# Patient Record
Sex: FEMALE | Race: WHITE | NOT HISPANIC OR LATINO | URBAN - METROPOLITAN AREA
[De-identification: names, ages, dates, MRNs, and addresses within clinical notes are randomized per-mention and may not be internally consistent; named-entity substitution may affect disease eponyms.]

---

## 2022-08-17 ENCOUNTER — INPATIENT (INPATIENT)
Facility: HOSPITAL | Age: 87
LOS: 5 days | Discharge: NURSING, REHAB OR LONG-TERM CARE | DRG: 184 | End: 2022-08-23
Attending: INTERNAL MEDICINE | Admitting: HOSPITALIST
Payer: MEDICARE

## 2022-08-17 VITALS
HEIGHT: 64 IN | WEIGHT: 149.91 LBS | HEART RATE: 116 BPM | OXYGEN SATURATION: 95 % | DIASTOLIC BLOOD PRESSURE: 73 MMHG | SYSTOLIC BLOOD PRESSURE: 121 MMHG | RESPIRATION RATE: 20 BRPM | TEMPERATURE: 98 F

## 2022-08-17 LAB
ALBUMIN SERPL ELPH-MCNC: 3 G/DL — LOW (ref 3.3–5)
ALP SERPL-CCNC: 95 U/L — SIGNIFICANT CHANGE UP (ref 40–120)
ALT FLD-CCNC: 7 U/L — LOW (ref 10–45)
ANION GAP SERPL CALC-SCNC: 11 MMOL/L — SIGNIFICANT CHANGE UP (ref 5–17)
APTT BLD: 45 SEC — HIGH (ref 27.5–35.5)
AST SERPL-CCNC: 15 U/L — SIGNIFICANT CHANGE UP (ref 10–40)
BASOPHILS # BLD AUTO: 0.04 K/UL — SIGNIFICANT CHANGE UP (ref 0–0.2)
BASOPHILS NFR BLD AUTO: 0.6 % — SIGNIFICANT CHANGE UP (ref 0–2)
BILIRUB SERPL-MCNC: 0.3 MG/DL — SIGNIFICANT CHANGE UP (ref 0.2–1.2)
BUN SERPL-MCNC: 12 MG/DL — SIGNIFICANT CHANGE UP (ref 7–23)
CALCIUM SERPL-MCNC: 10.5 MG/DL — SIGNIFICANT CHANGE UP (ref 8.4–10.5)
CHLORIDE SERPL-SCNC: 100 MMOL/L — SIGNIFICANT CHANGE UP (ref 96–108)
CO2 SERPL-SCNC: 29 MMOL/L — SIGNIFICANT CHANGE UP (ref 22–31)
CREAT SERPL-MCNC: 0.54 MG/DL — SIGNIFICANT CHANGE UP (ref 0.5–1.3)
EGFR: 89 ML/MIN/1.73M2 — SIGNIFICANT CHANGE UP
EOSINOPHIL # BLD AUTO: 0.11 K/UL — SIGNIFICANT CHANGE UP (ref 0–0.5)
EOSINOPHIL NFR BLD AUTO: 1.6 % — SIGNIFICANT CHANGE UP (ref 0–6)
GLUCOSE SERPL-MCNC: 137 MG/DL — HIGH (ref 70–99)
HCT VFR BLD CALC: 33.9 % — LOW (ref 34.5–45)
HGB BLD-MCNC: 10.2 G/DL — LOW (ref 11.5–15.5)
IMM GRANULOCYTES NFR BLD AUTO: 0.4 % — SIGNIFICANT CHANGE UP (ref 0–1.5)
INR BLD: 3.11 RATIO — HIGH (ref 0.88–1.16)
LYMPHOCYTES # BLD AUTO: 1.32 K/UL — SIGNIFICANT CHANGE UP (ref 1–3.3)
LYMPHOCYTES # BLD AUTO: 18.7 % — SIGNIFICANT CHANGE UP (ref 13–44)
MAGNESIUM SERPL-MCNC: 1.8 MG/DL — SIGNIFICANT CHANGE UP (ref 1.6–2.6)
MCHC RBC-ENTMCNC: 27.3 PG — SIGNIFICANT CHANGE UP (ref 27–34)
MCHC RBC-ENTMCNC: 30.1 GM/DL — LOW (ref 32–36)
MCV RBC AUTO: 90.6 FL — SIGNIFICANT CHANGE UP (ref 80–100)
MONOCYTES # BLD AUTO: 0.71 K/UL — SIGNIFICANT CHANGE UP (ref 0–0.9)
MONOCYTES NFR BLD AUTO: 10 % — SIGNIFICANT CHANGE UP (ref 2–14)
NEUTROPHILS # BLD AUTO: 4.86 K/UL — SIGNIFICANT CHANGE UP (ref 1.8–7.4)
NEUTROPHILS NFR BLD AUTO: 68.7 % — SIGNIFICANT CHANGE UP (ref 43–77)
NRBC # BLD: 0 /100 WBCS — SIGNIFICANT CHANGE UP (ref 0–0)
NT-PROBNP SERPL-SCNC: 337 PG/ML — HIGH (ref 0–300)
PLATELET # BLD AUTO: 430 K/UL — HIGH (ref 150–400)
POTASSIUM SERPL-MCNC: 3.7 MMOL/L — SIGNIFICANT CHANGE UP (ref 3.5–5.3)
POTASSIUM SERPL-SCNC: 3.7 MMOL/L — SIGNIFICANT CHANGE UP (ref 3.5–5.3)
PROT SERPL-MCNC: 6.7 G/DL — SIGNIFICANT CHANGE UP (ref 6–8.3)
PROTHROM AB SERPL-ACNC: 36.5 SEC — HIGH (ref 10.5–13.4)
RAPID RVP RESULT: SIGNIFICANT CHANGE UP
RBC # BLD: 3.74 M/UL — LOW (ref 3.8–5.2)
RBC # FLD: 14.8 % — HIGH (ref 10.3–14.5)
SARS-COV-2 RNA SPEC QL NAA+PROBE: SIGNIFICANT CHANGE UP
SODIUM SERPL-SCNC: 140 MMOL/L — SIGNIFICANT CHANGE UP (ref 135–145)
TROPONIN T, HIGH SENSITIVITY RESULT: 16 NG/L — SIGNIFICANT CHANGE UP (ref 0–51)
TROPONIN T, HIGH SENSITIVITY RESULT: 17 NG/L — SIGNIFICANT CHANGE UP (ref 0–51)
WBC # BLD: 7.07 K/UL — SIGNIFICANT CHANGE UP (ref 3.8–10.5)
WBC # FLD AUTO: 7.07 K/UL — SIGNIFICANT CHANGE UP (ref 3.8–10.5)

## 2022-08-17 PROCEDURE — 99285 EMERGENCY DEPT VISIT HI MDM: CPT | Mod: 25

## 2022-08-17 PROCEDURE — 71275 CT ANGIOGRAPHY CHEST: CPT | Mod: 26,MA

## 2022-08-17 PROCEDURE — 93010 ELECTROCARDIOGRAM REPORT: CPT

## 2022-08-17 RX ORDER — LIDOCAINE 4 G/100G
1 CREAM TOPICAL ONCE
Refills: 0 | Status: COMPLETED | OUTPATIENT
Start: 2022-08-17 | End: 2022-08-17

## 2022-08-17 RX ORDER — SODIUM CHLORIDE 9 MG/ML
500 INJECTION INTRAMUSCULAR; INTRAVENOUS; SUBCUTANEOUS ONCE
Refills: 0 | Status: COMPLETED | OUTPATIENT
Start: 2022-08-17 | End: 2022-08-17

## 2022-08-17 RX ORDER — ACETAMINOPHEN 500 MG
1000 TABLET ORAL ONCE
Refills: 0 | Status: COMPLETED | OUTPATIENT
Start: 2022-08-17 | End: 2022-08-17

## 2022-08-17 RX ADMIN — LIDOCAINE 1 PATCH: 4 CREAM TOPICAL at 21:37

## 2022-08-17 RX ADMIN — Medication 400 MILLIGRAM(S): at 22:01

## 2022-08-17 RX ADMIN — SODIUM CHLORIDE 1000 MILLILITER(S): 9 INJECTION INTRAMUSCULAR; INTRAVENOUS; SUBCUTANEOUS at 20:09

## 2022-08-17 NOTE — CONSULT NOTE ADULT - ATTENDING COMMENTS
88 yo f, on AC, found to have abnormal TTE, transferred to [facility] for evaluation, h/o fall a few days ago, injuries include left 10-11 rib fractures.  - Recommend admission to medicine for cardiac workup.  - No respiratory distress, continue with IS and pulmonary toilet. Currently pulling 600cc on IS.  - No surgical intervention indicated.

## 2022-08-17 NOTE — ED PROVIDER NOTE - ATTENDING CONTRIBUTION TO CARE
Attending (Alex Quinn D.O.):  I have personally seen and examined this patient. I have performed a substantive portion of the visit including all aspects of the medical decision making. Resident, fellow, and/or ACP note reviewed. I agree on the plan of care except where noted.    see mdm

## 2022-08-17 NOTE — ED PROVIDER NOTE - NS ED ATTENDING STATEMENT MOD
Attending with consult w/ pt's family directly relating to pts condition/direct patient care (not related to procedure)/documentation/additional history taking/interpretation of diagnostic studies/consultation with other physicians

## 2022-08-17 NOTE — ED PROVIDER NOTE - CLINICAL SUMMARY MEDICAL DECISION MAKING FREE TEXT BOX
Attending (Alex Quinn D.O.):  87F on xarelto for unclear reason here from cardiology after abnormal TTE and ekg but unclear what (no documents). Patient has been having bilateral shoulder pain for years. + Fall 8/5 and since then increased difficulty ambulating 2/2 pain by chest wall. + bruise to left chest wall. Benign abdomen. + rales at left lung base. Tachycardic here, on RA, hemodynamically stable. No other distal extremity tenderness. Neurovasc intact. Eval for PE vs ACS vs metabolic derrangement vs occult anemia. No fever, low suspicion for infectious process at present. Check labs, cardiac biomarkers,, EKG, CXR, place on cardiac monitor for telemetry monitoring. CTA chest. Hydrate. Attending (Alex Quinn D.O.):  87F on xarelto for unclear reason here from cardiology after abnormal TTE and ekg but unclear what (no documents). Patient has been having bilateral shoulder pain for years. + Fall 8/5 and since then increased difficulty ambulating 2/2 pain by chest wall. + bruise to left chest wall. Benign abdomen. + rales at left lung base. Tachycardic here, on RA, hemodynamically stable. No other distal extremity tenderness. Neurovasc intact. Eval for PE vs ACS vs metabolic derrangement vs occult anemia. No fever, low suspicion for infectious process at present. Check labs, cardiac biomarkers,, EKG, CXR, place on cardiac monitor for telemetry monitoring. CTA chest. Nazario Bernstein PGY2: well appearing female presents for direct admission with tachycardia and sob. on xarelto per old documentation, unknown why she takes it. concern for PE v UTI as reason for tachycardia. no electrical alternans. no muffled heart sounds on exam. infectious and screening labs. CXR for atypical pna. MERCEDES ballard.

## 2022-08-17 NOTE — ED PROVIDER NOTE - PROGRESS NOTE DETAILS
Attending (Alex Quinn D.O.):  CTA commen on Left rib fracture 10/11. Will dose analgesia as patient's pain increasing. No resp distress, still sat >95% on RA. Will discuss with surg.

## 2022-08-17 NOTE — ED PROVIDER NOTE - OBJECTIVE STATEMENT
h/o arthritis presents with request for direct admission to Dr. Braydon Cunningham in the setting of outpatient echo that was abnormal. unknown echo findings. pt denies fever chills. +sob +palps +skipping beats. +urinary odor and concentration noted today. no abd pain n/v. +wt loss in the setting of decreased appetire. no LE edema or pain.

## 2022-08-17 NOTE — ED PROVIDER NOTE - PHYSICAL EXAMINATION
General: non-toxic, NAD  HEENT: NCAT, PERRL  Cardiac: tachycardic no murmurs, 2+ peripheral pulses  Resp: CTAB  Abdomen: soft, non-distended, bowel sounds present, no ttp, no rebound or guarding. no organomegaly  MSK: no midline spinal tenderness. no peripheral edema, calf tenderness, or leg size discrepancies  Skin: ecchymosis L flank  Neuro: AAOx4, 5+motor, sensation grossly intact CN 2-12 intact  Psych: mood and affect appropriate

## 2022-08-17 NOTE — CONSULT NOTE ADULT - SUBJECTIVE AND OBJECTIVE BOX
TRAUMA SURGERY CONSULT NOTE  Consulting surgical team: Trauma ACS  Consulting attending: Dr. Hoyt    HPI:  88yo F on xarelto (?) presenting due to outpt referral from cardiology due to abnormal TTE. Pt has been feeling weak for several months, and saw cardiology (Dr. Wicho Forbes) earlier this week who sent pt in due to concerning findings on echo ("weak heart and fast heart rate" per daughter).     Trauma consulted due to reported fall a few days ago. Son reports that pt fell at home while trying to hold on to a doorknob but missed it, fell on side, no head strike or LOC. Pt does not have pain, n/v, or dizziness. Pt pulling 600cc on IS.    PAST MEDICAL HISTORY:  Unknown    PAST SURGICAL HISTORY:  B/l knee replacements  Hip surgery    MEDICATIONS:      ALLERGIES:  No Known Allergies      VITALS & I/Os:  Vital Signs Last 24 Hrs  T(C): 36.7 (17 Aug 2022 18:20), Max: 36.7 (17 Aug 2022 16:27)  T(F): 98 (17 Aug 2022 18:20), Max: 98.1 (17 Aug 2022 16:27)  HR: 115 (17 Aug 2022 18:20) (115 - 116)  BP: 111/75 (17 Aug 2022 18:20) (111/75 - 121/73)  BP(mean): --  RR: 20 (17 Aug 2022 16:27) (20 - 20)  SpO2: 97% (17 Aug 2022 18:20) (95% - 97%)    Parameters below as of 17 Aug 2022 18:20  Patient On (Oxygen Delivery Method): room air        PHYSICAL EXAM:  GEN: resting comfortably in bed, in NAD  CHEST: no increased WOB  ABD: soft, non-distended, non-tender   EXT: warm, well perfused  NEURO: A&Ox3    LABS:                        10.2   7.07  )-----------( 430      ( 17 Aug 2022 20:11 )             33.9     08-17    140  |  100  |  12  ----------------------------<  137<H>  3.7   |  29  |  0.54    Ca    10.5      17 Aug 2022 20:11  Mg     1.8     08-17    TPro  6.7  /  Alb  3.0<L>  /  TBili  0.3  /  DBili  x   /  AST  15  /  ALT  7<L>  /  AlkPhos  95  08-17    Lactate:    PT/INR - ( 17 Aug 2022 20:11 )   PT: 36.5 sec;   INR: 3.11 ratio         PTT - ( 17 Aug 2022 20:11 )  PTT:45.0 sec        IMAGING:  CT Angio Chest PE Protocol w/ IV Cont (08.17.22 @ 20:56)    FINDINGS:    PULMONARY ANGIOGRAM: Limited evaluation of some segmental and   subsegmental pulmonary arteries. There is no pulmonary embolism to the   level of the lobar arteries, or within the visualized segmental and   subsegmentalbranches.    LYMPH NODES: Unremarkable bilateral axillary lymph nodes. 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 Absent thyroid gland.    HEART/VASCULATURE: Cardiomegaly. No pericardial effusion. Coronary artery   calcifications. Aortic calcifications.    AIRWAYS/LUNGS/PLEURA: Central airways are patent. Scarring and   calcification in the right lung apex. Mild medial left lower lobe   atelectasis. Small left pleural effusion. Few scattered <6mm solid   pulmonary nodules bilaterally.    UPPER ABDOMEN: Large hiatal hernia. Punctate right hepatic lobe   calcification. Specifically bilateral adrenal gland thickening.    BONES/SOFT TISSUES: Degenerative changes. Acute nondisplaced left   posterior 10th and 11th rib fractures. Healed right rib fractures.   Paraspinal muscular atrophy. Mild anasarca.    IMPRESSION:    Limited evaluation of some segmental and subsegmental pulmonary arteries.   There is no pulmonary embolism to the level of the lobar arteries, or   within the visualized segmental and subsegmental branches.    Acute nondisplaced left 10th and 11th rib fractures. Small left pleural   effusion.   TRAUMA SURGERY CONSULT NOTE  Consulting surgical team: Trauma ACS  Consulting attending: Dr. Hoyt    HPI:  88yo F on xarelto (?) presenting due to outpt referral from cardiology due to abnormal TTE. Pt has been feeling weak for several months, and saw cardiology (Dr. Wicho Forbes) earlier this week who sent pt in due to concerning findings on echo ("weak heart and fast heart rate" per daughter).     Trauma consulted due to reported fall a few days ago. Son reports that pt fell at home while trying to hold on to a doorknob but missed it, fell on side, no head strike or LOC. Pt does not have pain, n/v, or dizziness. Pt pulling 600cc on IS.    PAST MEDICAL HISTORY:  Unknown    PAST SURGICAL HISTORY:  B/l knee replacements  Hip surgery    MEDICATIONS:      ALLERGIES:  No Known Allergies      VITALS & I/Os:  Vital Signs Last 24 Hrs  T(C): 36.7 (17 Aug 2022 18:20), Max: 36.7 (17 Aug 2022 16:27)  T(F): 98 (17 Aug 2022 18:20), Max: 98.1 (17 Aug 2022 16:27)  HR: 115 (17 Aug 2022 18:20) (115 - 116)  BP: 111/75 (17 Aug 2022 18:20) (111/75 - 121/73)  BP(mean): --  RR: 20 (17 Aug 2022 16:27) (20 - 20)  SpO2: 97% (17 Aug 2022 18:20) (95% - 97%)    Parameters below as of 17 Aug 2022 18:20  Patient On (Oxygen Delivery Method): room air        PHYSICAL EXAM:  GEN: resting comfortably in bed, in NAD  HEENT: normocephalic, non-tender to palpation, no abrasions visible, no step-offs palpated  NECK: trachea midline, non-tender to palpation at posterior midline, no pain with flexion, extension, and bilateral neck rotation  CHEST: non-tender to palpation across clavicles and b/l anterior ribs  BACK: non-tender to palpation along cervical, thoracic, lumbar spine midline and b/l posterior ribs; no palpable step-offs or hematomas  ABD: soft, non-distended, non-tender   PELVIS: no pelvic instability  RECTUM: good rectal tone  LUE: no visible abrasions or deformities, non-tender to palpation across upper and lower arm, 5/5  strength, fingers warm, well-perfused, full ROM in shoulder, elbow, wrist, and fingers, palpable radial pulse  RUE: no visible abrasions or deformities, non-tender to palpation across upper and lower arm, 5/5  strength, fingers warm, well-perfused, full ROM in shoulder, elbow, wrist, and fingers, palpable radial pulse  LLE: no visible abrasions or deformities, non-tender to palpation across upper and lower leg; full ROM in hip, knee, ankle, and toes, 5/5 dorsiflexion + plantarflexion, palpable DP pulse; warm, well-perfused  RLE: no visible abrasions or deformities, non-tender to palpation across upper and lower leg; full ROM in hip, knee, ankle, and toes, 5/5 dorsiflexion + plantarflexion, palpable DP pulse; warm, well-perfused  NEURO: AAOx4, no focal neuro deficits; CN II-IX intact; pupils 3mm, equal and reactive to light bilaterally      LABS:                        10.2   7.07  )-----------( 430      ( 17 Aug 2022 20:11 )             33.9     08-17    140  |  100  |  12  ----------------------------<  137<H>  3.7   |  29  |  0.54    Ca    10.5      17 Aug 2022 20:11  Mg     1.8     08-17    TPro  6.7  /  Alb  3.0<L>  /  TBili  0.3  /  DBili  x   /  AST  15  /  ALT  7<L>  /  AlkPhos  95  08-17    Lactate:    PT/INR - ( 17 Aug 2022 20:11 )   PT: 36.5 sec;   INR: 3.11 ratio         PTT - ( 17 Aug 2022 20:11 )  PTT:45.0 sec        IMAGING:  CT Angio Chest PE Protocol w/ IV Cont (08.17.22 @ 20:56)    FINDINGS:    PULMONARY ANGIOGRAM: Limited evaluation of some segmental and   subsegmental pulmonary arteries. There is no pulmonary embolism to the   level of the lobar arteries, or within the visualized segmental and   subsegmentalbranches.    LYMPH NODES: Unremarkable bilateral axillary lymph nodes. 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 Absent thyroid gland.    HEART/VASCULATURE: Cardiomegaly. No pericardial effusion. Coronary artery   calcifications. Aortic calcifications.    AIRWAYS/LUNGS/PLEURA: Central airways are patent. Scarring and   calcification in the right lung apex. Mild medial left lower lobe   atelectasis. Small left pleural effusion. Few scattered <6mm solid   pulmonary nodules bilaterally.    UPPER ABDOMEN: Large hiatal hernia. Punctate right hepatic lobe   calcification. Specifically bilateral adrenal gland thickening.    BONES/SOFT TISSUES: Degenerative changes. Acute nondisplaced left   posterior 10th and 11th rib fractures. Healed right rib fractures.   Paraspinal muscular atrophy. Mild anasarca.    IMPRESSION:    Limited evaluation of some segmental and subsegmental pulmonary arteries.   There is no pulmonary embolism to the level of the lobar arteries, or   within the visualized segmental and subsegmental branches.    Acute nondisplaced left 10th and 11th rib fractures. Small left pleural   effusion.

## 2022-08-17 NOTE — ED ADULT NURSE NOTE - OBJECTIVE STATEMENT
Assumed care of pt at 1918. Pt A&Ox4 c/o recent cardiology visit advising pt to go to ED for further evaluation. Pt accompanied by daughter who is primary caretaker. As Per pt daughter, pt fell on 8/5 at home, pt bruised back but all xrays came back unremarkable. As per pt daughter ever since fall pt has "been having a lot of problems with pain, walking, and moving arms/legs.". Pt denies chest pain and SOB, pt 98% on RA, RR even and unlabored. Pt reports to having 7/10 pain in back and arms. Pt Afib rate controlled on tele. Cardiac monitoring initiated. MD at bedside assessing pt.

## 2022-08-17 NOTE — CONSULT NOTE ADULT - ASSESSMENT
88yo F on xarelto (?), unknown medical history presenting due to outpt referral from cardiology due to abnormal TTE. Trauma surgery consulted due to fall a few days ago.    Plan:  - L 10 and 11 rib fx: pain control per rib fx management protocol, encourage IS, OOB  - Medicine admission for cardiac workup  - Tertiary in AM  - Discussed with Dr. Hoyt    ACS/Trauma Surgery  [id number]  86yo F on xarelto (?), unknown medical history presenting due to outpt referral from cardiology due to abnormal TTE. Trauma surgery consulted due to fall a few days ago.    Plan:  - L 10 and 11 rib fx: pain control per rib fx management protocol, encourage IS, OOB  - Pt to be admitted to medicine for cardiac workup  - Tertiary in AM  - Discussed with Dr. Hoyt    ACS/Trauma Surgery  [id number]

## 2022-08-17 NOTE — ED ADULT NURSE NOTE - CAS EDN INTEG ASSESS
Noelle Bryant is 7 year old 0 month old, here for a preventive care visit.    Assessment & Plan   1. Encounter for routine child health examination with abnormal findings  See below. Father declined influenza vaccine.   - BEHAVIORAL/EMOTIONAL ASSESSMENT (04638)  - SCREENING TEST, PURE TONE, AIR ONLY  - SCREENING, VISUAL ACUITY, QUANTITATIVE, BILAT    2. Speech delay  3. Developmental delay  Patient comes from a Hmong speaking home, and attends school in which English is the primary language. In my brief evaluation today with the assistance of a Hmong  shows that Noelle would not answer simple questions appropriately in English or Hmong. It sounds like Noelle is currently undergoing some evaluation through the school as the family was recently called by someone to discuss Noelle's development. The family was informed from the school that there is also some behavioral concerns including difficulty with self care after going to the bathroom (wiping). Review of chart shows IEP from 2021 that indicated language and developmental delay. It does not appear that genetic testing has been completed.    - ROBYN signed for school. Will have social work team reach out to obtain IEP  - Discuss referral for genetic evaluation given several siblings have similar difficulties  - Close follow up indicated      4. Failed hearing screening  Patient was unable to complete hearing screen today.  She did not appear to understand the concept for the screening test.  Discussed that next step is for her to be evaluated officially by audiology. Father declines this as he does not believe that she has an issue with hearing. Notably, on exam the patient's left TM was somewhat red and retracted. No other signs or symptoms of infection on exam. No antibiotics indicated at this time.  - Follow up in 1 month for hearing rescreen in clinic and further discussion regarding audiology referral  - Recheck left TM for  change      Growth        Normal height and weight    No weight concerns.    Immunizations     Patient/Parent(s) declined some/all vaccines today.  influenza      Anticipatory Guidance    Reviewed age appropriate anticipatory guidance.   The following topics were discussed:  SOCIAL/ FAMILY:    Encourage reading  NUTRITION:    Healthy snacks    Balanced diet  HEALTH/ SAFETY:    Physical activity    Regular dental care    Referrals/Ongoing Specialty Care  Verbal referral for routine dental care  Referral made to audiology, however father declined after discussion    Follow Up      Return in 1 month (on 3/18/2022) for Follow up speech delay, Left ear exam.    Subjective     Additional Questions 2/18/2022   Do you have any questions today that you would like to discuss? Yes   Questions Concern about slow speaking. Father feels that she doesn't respond to questions quickly.    Has your child had a surgery, major illness or injury since the last physical exam? No       Social 2/18/2022   Who does your child live with? Parent(s)   Has your child experienced any stressful family events recently? None   In the past 12 months, has lack of transportation kept you from medical appointments or from getting medications? No   In the last 12 months, was there a time when you were not able to pay the mortgage or rent on time? No   In the last 12 months, was there a time when you did not have a steady place to sleep or slept in a shelter (including now)? No     Health Risks/Safety 2/18/2022   What type of car seat does your child use? (!) SEAT BELT ONLY   Where does your child sit in the car?  Back seat   Do you have a swimming pool? No   Is your child ever home alone?  No     TB Screening 2/18/2022   Since your last Well Child visit, have any of your child's family members or close contacts had tuberculosis or a positive tuberculosis test? No   Since your last Well Child Visit, has your child or any of their family members or close  contacts traveled or lived outside of the [place]? No   Since your last Well Child visit, has your child lived in a high-risk group setting like a correctional facility, health care facility, homeless shelter, or refugee camp? No           Dental Screening 2/18/2022   Has your child seen a dentist? Yes   When was the last visit? 3 months to 6 months ago   Has your child had cavities in the last 3 years? No   Has your child s parent(s), caregiver, or sibling(s) had any cavities in the last 2 years?  No     Diet 2/18/2022   Do you have questions about feeding your child? No   What does your child regularly drink? Water   What type of water? (!) BOTTLED, (!) FILTERED   How often does your family eat meals together? Most days   How many snacks does your child eat per day 2   Are there types of foods your child won't eat? (!) YES   Please specify: Vegetable   Does your child get at least 3 servings of food or beverages that have calcium each day (dairy, green leafy vegetables, etc)? Yes   Within the past 12 months, you worried that your food would run out before you got money to buy more. Never true   Within the past 12 months, the food you bought just didn't last and you didn't have money to get more. Never true     Elimination 2/18/2022   Do you have any concerns about your child's bladder or bowels? No concerns       Activity 2/18/2022   On average, how many days per week does your child engage in moderate to strenuous exercise (like walking fast, running, jogging, dancing, swimming, biking, or other activities that cause a light or heavy sweat)? 7 days   On average, how many minutes does your child engage in exercise at this level? (!) 30 MINUTES   What does your child do for exercise?  Runs   What activities is your child involved with?  Coloring     Media Use 2/18/2022   How many hours per day is your child viewing a screen for entertainment?    1 hour   Does your child use a screen in their bedroom? No  "    Sleep 2/18/2022   Do you have any concerns about your child's sleep?  No concerns, sleeps well through the night       Vision/Hearing 2/18/2022   Do you have any concerns about your child's hearing or vision?  No concerns     Vision Screen  Vision Screen Details  Does the patient have corrective lenses (glasses/contacts)?: No  No Corrective Lenses, PLUS LENS REQUIRED: Pass  Vision Acuity Screen  Vision Acuity Tool: HOTV  RIGHT EYE: 10/10 (20/20)  LEFT EYE: 10/10 (20/20)  Is there a two line difference?: No  Vision Screen Results: Pass    Hearing Screen  Hearing Screen Not Completed  Reason Hearing Screen was not completed: Attempted, unable to cooperate  Comments:: Patient doesn't understand the instruction and doesn't cooperate well.      School 2/18/2022   Do you have any concerns about your child's learning in school? No concerns   What grade is your child in school? 1st Grade   What school does your child attend? [employer]   Does your child typically miss more than 2 days of school per month? No   Do you have concerns about your child's friendships or peer relationships?  No     Development / Social-Emotional Screen 2/18/2022   Does your child receive any special educational services? No     Mental Health - PSC-17 required for C&TC    Social-Emotional screening:   Electronic PSC   PSC SCORES 2/18/2022   Inattentive / Hyperactive Symptoms Subtotal 0   Externalizing Symptoms Subtotal 1   Internalizing Symptoms Subtotal 1   PSC - 17 Total Score 2       Follow up:  PSC-17 PASS (<15), no follow up necessary     No concerns       Objective     Exam  /81 (BP Location: Right arm, Patient Position: Sitting, Cuff Size: Child)   Pulse 104   Temp 97.8  F (36.6  C) (Oral)   Resp 20   Ht 1.168 m (3' 10\")   Wt 24.2 kg (53 lb 6.4 oz)   SpO2 96%   BMI 17.74 kg/m    19 %ile (Z= -0.88) based on CDC (Girls, 2-20 Years) Stature-for-age data based on Stature recorded on 2/18/2022.  64 %ile (Z= 0.37) based " on CDC (Girls, 2-20 Years) weight-for-age data using vitals from 2/18/2022.  86 %ile (Z= 1.08) based on CDC (Girls, 2-20 Years) BMI-for-age based on BMI available as of 2/18/2022.  Blood pressure percentiles are 98 % systolic and >99 % diastolic based on the 2017 AAP Clinical Practice Guideline. This reading is in the Stage 1 hypertension range (BP >= 95th percentile).  Physical Exam  GENERAL: Alert, well appearing, no distress  SKIN: Clear. No significant rash, abnormal pigmentation or lesions  HEAD: Normocephalic.  EYES:  Symmetric light reflex and no eye movement on cover/uncover test. Normal conjunctivae.  EARS: Bilateral canals are mildly erythematous, unable to visualize the R TM fully due to cerumen, L TM is more red in color, with some retracting.   NOSE: Normal without discharge.  MOUTH/THROAT: Clear. No oral lesions. Teeth without obvious abnormalities.  NECK: Supple, no masses.  No thyromegaly.  LYMPH NODES: No adenopathy  LUNGS: Clear. No rales, rhonchi, wheezing or retractions  HEART: Regular rhythm. Normal S1/S2. No murmurs. Normal pulses.  ABDOMEN: Soft, non-tender, not distended, no masses or hepatosplenomegaly. Bowel sounds normal.   GENITALIA: Normal female external genitalia. Mumtaz stage I,  No inguinal herniae are present.  EXTREMITIES: Full range of motion, no deformities  NEUROLOGIC: No focal findings. Cranial nerves grossly intact: DTR's normal. Normal gait, strength and tone    Patient was seen by and discussed with attending physician, Dr. Hernandez.     Franny Davis MD  [facility]   - - -

## 2022-08-18 DIAGNOSIS — Z29.9 ENCOUNTER FOR PROPHYLACTIC MEASURES, UNSPECIFIED: ICD-10-CM

## 2022-08-18 DIAGNOSIS — Z79.899 OTHER LONG TERM (CURRENT) DRUG THERAPY: ICD-10-CM

## 2022-08-18 DIAGNOSIS — S22.39XA FRACTURE OF ONE RIB, UNSPECIFIED SIDE, INITIAL ENCOUNTER FOR CLOSED FRACTURE: ICD-10-CM

## 2022-08-18 DIAGNOSIS — Z98.890 OTHER SPECIFIED POSTPROCEDURAL STATES: Chronic | ICD-10-CM

## 2022-08-18 DIAGNOSIS — N39.0 URINARY TRACT INFECTION, SITE NOT SPECIFIED: ICD-10-CM

## 2022-08-18 DIAGNOSIS — Z86.79 PERSONAL HISTORY OF OTHER DISEASES OF THE CIRCULATORY SYSTEM: ICD-10-CM

## 2022-08-18 DIAGNOSIS — R00.0 TACHYCARDIA, UNSPECIFIED: ICD-10-CM

## 2022-08-18 PROBLEM — Z00.00 ENCOUNTER FOR PREVENTIVE HEALTH EXAMINATION: Status: ACTIVE | Noted: 2022-08-18

## 2022-08-18 LAB
APPEARANCE UR: CLEAR — SIGNIFICANT CHANGE UP
BACTERIA # UR AUTO: ABNORMAL
BILIRUB UR-MCNC: NEGATIVE — SIGNIFICANT CHANGE UP
COLOR SPEC: YELLOW — SIGNIFICANT CHANGE UP
DIFF PNL FLD: ABNORMAL
EPI CELLS # UR: 0 /HPF — SIGNIFICANT CHANGE UP
GLUCOSE UR QL: NEGATIVE — SIGNIFICANT CHANGE UP
HYALINE CASTS # UR AUTO: 1 /LPF — SIGNIFICANT CHANGE UP (ref 0–2)
KETONES UR-MCNC: NEGATIVE — SIGNIFICANT CHANGE UP
LEUKOCYTE ESTERASE UR-ACNC: ABNORMAL
NITRITE UR-MCNC: NEGATIVE — SIGNIFICANT CHANGE UP
PH UR: 7 — SIGNIFICANT CHANGE UP (ref 5–8)
PROT UR-MCNC: ABNORMAL
RBC CASTS # UR COMP ASSIST: 56 /HPF — HIGH (ref 0–4)
SP GR SPEC: >1.05 (ref 1.01–1.02)
UROBILINOGEN FLD QL: ABNORMAL
WBC UR QL: 215 /HPF — HIGH (ref 0–5)

## 2022-08-18 PROCEDURE — 99223 1ST HOSP IP/OBS HIGH 75: CPT

## 2022-08-18 PROCEDURE — 93306 TTE W/DOPPLER COMPLETE: CPT | Mod: 26

## 2022-08-18 RX ORDER — SIMVASTATIN 20 MG/1
20 TABLET, FILM COATED ORAL AT BEDTIME
Refills: 0 | Status: DISCONTINUED | OUTPATIENT
Start: 2022-08-18 | End: 2022-08-23

## 2022-08-18 RX ORDER — RIVAROXABAN 15 MG-20MG
20 KIT ORAL
Refills: 0 | Status: DISCONTINUED | OUTPATIENT
Start: 2022-08-18 | End: 2022-08-23

## 2022-08-18 RX ORDER — PANTOPRAZOLE SODIUM 20 MG/1
40 TABLET, DELAYED RELEASE ORAL
Refills: 0 | Status: DISCONTINUED | OUTPATIENT
Start: 2022-08-18 | End: 2022-08-23

## 2022-08-18 RX ORDER — LIDOCAINE 4 G/100G
1 CREAM TOPICAL DAILY
Refills: 0 | Status: DISCONTINUED | OUTPATIENT
Start: 2022-08-19 | End: 2022-08-23

## 2022-08-18 RX ORDER — LEVOTHYROXINE SODIUM 125 MCG
100 TABLET ORAL DAILY
Refills: 0 | Status: DISCONTINUED | OUTPATIENT
Start: 2022-08-18 | End: 2022-08-23

## 2022-08-18 RX ORDER — ACETAMINOPHEN 500 MG
650 TABLET ORAL EVERY 6 HOURS
Refills: 0 | Status: DISCONTINUED | OUTPATIENT
Start: 2022-08-18 | End: 2022-08-23

## 2022-08-18 RX ORDER — CEFTRIAXONE 500 MG/1
1000 INJECTION, POWDER, FOR SOLUTION INTRAMUSCULAR; INTRAVENOUS EVERY 24 HOURS
Refills: 0 | Status: COMPLETED | OUTPATIENT
Start: 2022-08-18 | End: 2022-08-20

## 2022-08-18 RX ORDER — METOPROLOL TARTRATE 50 MG
100 TABLET ORAL
Refills: 0 | Status: DISCONTINUED | OUTPATIENT
Start: 2022-08-18 | End: 2022-08-20

## 2022-08-18 RX ADMIN — CEFTRIAXONE 100 MILLIGRAM(S): 500 INJECTION, POWDER, FOR SOLUTION INTRAMUSCULAR; INTRAVENOUS at 23:50

## 2022-08-18 RX ADMIN — RIVAROXABAN 20 MILLIGRAM(S): KIT at 17:47

## 2022-08-18 RX ADMIN — PANTOPRAZOLE SODIUM 40 MILLIGRAM(S): 20 TABLET, DELAYED RELEASE ORAL at 17:47

## 2022-08-18 RX ADMIN — SIMVASTATIN 20 MILLIGRAM(S): 20 TABLET, FILM COATED ORAL at 21:15

## 2022-08-18 RX ADMIN — Medication 100 MILLIGRAM(S): at 17:47

## 2022-08-18 RX ADMIN — CEFTRIAXONE 100 MILLIGRAM(S): 500 INJECTION, POWDER, FOR SOLUTION INTRAMUSCULAR; INTRAVENOUS at 03:20

## 2022-08-18 NOTE — H&P ADULT - PROBLEM SELECTOR PLAN 3
Patient sent in by cardiologist for further workup, unclear what workup was done outpatient  - trop 17-->16,   - EKG shows sinus tachycardia  - f/u TTE  - obtain outpt records

## 2022-08-18 NOTE — H&P ADULT - HISTORY OF PRESENT ILLNESS
87F (Ind[place]ian speaking, [place]  [place] [id number]) with PMH of HTN, HLD, arthritis presents to the ED for cardiac workup. History obtained from daughter over the phone. Per daughter, patient went to her cardiologist yesterday (Dr Wicho Forbes) and was told her heart was weak and she had a fast heart rate. Daughter says that patient has also been feeling very weak and has had decreased appetite, similar to when she has a urinary tract infection. Of note, patient had a fall about a week ago when she woke up in the middle of the night to go to the bathroom when she tried to grab the doorknob, missed and fell onto her side. Patient did not hit her head or lose consciousness. She usually ambulates with a walker. No fevers or chills. Occasional shortness of breath and palpitations. No nausea or vomiting but decreased appetite and some weight loss. She has been having foul smelling urine.    In the ED, Tis 98.1, , /73, RR 20 satting 95% on room air. Patient received 500cc NS, lidocaine patch, and 1g tylenol. Trauma surgery consulted.

## 2022-08-18 NOTE — CHART NOTE - NSCHARTNOTEFT_GEN_A_CORE
TRAUMA SERVICE TERTIARY EXAM    HPI:  87F (Estonian speaking, [place]  [place] [id number]) with PMH of HTN, HLD, arthritis presents to the ED for cardiac workup. History obtained from daughter over the phone. Per daughter, patient went to her cardiologist yesterday (Dr Wicho Forbes) and was told her heart was weak and she had a fast heart rate. Daughter says that patient has also been feeling very weak and has had decreased appetite, similar to when she has a urinary tract infection. Of note, patient had a fall about a week ago when she woke up in the middle of the night to go to the bathroom when she tried to grab the doorknob, missed and fell onto her side. Patient did not hit her head or lose consciousness. She usually ambulates with a walker. No fevers or chills. Occasional shortness of breath and palpitations. No nausea or vomiting but decreased appetite and some weight loss. She has been having foul smelling urine.    In the ED, Tis 98.1, , /73, RR 20 satting 95% on room air. Patient received 500cc NS, lidocaine patch, and 1g tylenol. Trauma surgery consulted.  (18 Aug 2022 01:39)      PAST MEDICAL & SURGICAL HISTORY:  Hypertension      Hyperlipidemia      H/O knee surgery        PRIMARY SURVEY:  A - airway intact  B - bilateral equal chest rise, no increased WOB  C - palpable pulses in all extremities;   D - GCS   E - exposure obtained      TERTIARY SURVEY:   GEN: resting comfortably in bed, in NAD  HEENT: normocephalic, non-tender to palpation, no abrasions visible, no step-offs palpated  NECK: no JVD, non-tender to palpation at posterior midline, no pain with flexion, extension, and bilateral neck rotation  CHEST: non-tender to palpation across clavicles and b/l anterior ribs  BACK: non-tender to palpation along cervical, thoracic, lumbar spine midline and b/l posterior ribs; no palpable step-offs or hematomas  ABD: soft, non-distended, non-tender to palpation in all quadrants without rebound tenderness or guarding  LUE: tender to palpation across upper and lower arm, 5/5  strength, fingers warm, well-perfused, full ROM in shoulder, elbow, wrist, and fingers, palpable radial pulses  RUE: tender to palpation across upper and lower arm, 5/5  strength, fingers warm, well-perfused, full ROM in shoulder, elbow, wrist, and fingers, palpable radial pulses  LLE: non-tender to palpation across upper and lower leg; full ROM in hip, knee, ankle, and toes, 5/5 dorsiflexion + plantarflexion, palpable DP + PT pulses; warm, well-perfused  RLE: non-tender to palpation across upper and lower leg; full ROM in hip, knee, ankle, and toes, 5/5 dorsiflexion + plantarflexion, palpable DP + PT pulses; warm, well-perfused  NEURO: AAOx4, no focal neuro deficits; CN II-IX intact     Medications (inpatient): cefTRIAXone   IVPB 1000 milliGRAM(s) IV Intermittent every 24 hours    Medications (PRN):acetaminophen     Tablet .. 650 milliGRAM(s) Oral every 6 hours PRN    Allergies: No Known Allergies  (Intolerances: )    Vital Signs Last 24 Hrs  T(C): 36.6 (18 Aug 2022 12:26), Max: 36.8 (17 Aug 2022 23:28)  T(F): 97.8 (18 Aug 2022 12:26), Max: 98.2 (17 Aug 2022 23:28)  HR: 85 (18 Aug 2022 12:26) (85 - 116)  BP: 111/71 (18 Aug 2022 12:26) (111/71 - 121/73)  BP(mean): --  RR: 18 (18 Aug 2022 12:26) (18 - 20)  SpO2: 98% (18 Aug 2022 12:26) (95% - 100%)    Parameters below as of 18 Aug 2022 12:26  Patient On (Oxygen Delivery Method): room air      Drug Dosing Weight  Height (cm): 162.6 (17 Aug 2022 16:27)  Weight (kg): 68 (17 Aug 2022 16:27)  BMI (kg/m2): 25.7 (17 Aug 2022 16:27)  BSA (m2): 1.73 (17 Aug 2022 16:27)                          10.2   7.07  )-----------( 430      ( 17 Aug 2022 20:11 )             33.9     08-17    140  |  100  |  12  ----------------------------<  137<H>  3.7   |  29  |  0.54    Ca    10.5      17 Aug 2022 20:11  Mg     1.8     08-17    TPro  6.7  /  Alb  3.0<L>  /  TBili  0.3  /  DBili  x   /  AST  15  /  ALT  7<L>  /  AlkPhos  95  08-17    PT/INR - ( 17 Aug 2022 20:11 )   PT: 36.5 sec;   INR: 3.11 ratio         PTT - ( 17 Aug 2022 20:11 )  PTT:45.0 sec  Urinalysis Basic - ( 17 Aug 2022 23:58 )    Color: Yellow / Appearance: Clear / SG: >1.050 / pH: x  Gluc: x / Ketone: Negative  / Bili: Negative / Urobili: 2 mg/dL   Blood: x / Protein: Trace / Nitrite: Negative   Leuk Esterase: Large / RBC: 56 /hpf /  /HPF   Sq Epi: x / Non Sq Epi: 0 /hpf / Bacteria: Many        ASSESSMENT:   86yo F on xarelto (?), unknown medical history presenting due to outpt referral from cardiology due to abnormal TTE. Trauma surgery consulted due to fall a few days ago.    Plan:  - L 10 and 11 rib fx: pain control per rib fx management protocol, encourage IS, OOB  - Pt to be admitted to medicine for cardiac workup  - if any further questions, page [phone]    ACS/Trauma Surgery  [id number] TRAUMA SERVICE TERTIARY EXAM    HPI:  87F ([place]rainian speaking, [place]  [place] [id number]) with PMH of HTN, HLD, arthritis presents to the ED for cardiac workup. History obtained from daughter over the phone. Per daughter, patient went to her cardiologist yesterday (Dr Wicho Forbes) and was told her heart was weak and she had a fast heart rate. Daughter says that patient has also been feeling very weak and has had decreased appetite, similar to when she has a urinary tract infection. Of note, patient had a fall about a week ago when she woke up in the middle of the night to go to the bathroom when she tried to grab the doorknob, missed and fell onto her side. Patient did not hit her head or lose consciousness. She usually ambulates with a walker. No fevers or chills. Occasional shortness of breath and palpitations. No nausea or vomiting but decreased appetite and some weight loss. She has been having foul smelling urine.    In the ED, Tis 98.1, , /73, RR 20 satting 95% on room air. Patient received 500cc NS, lidocaine patch, and 1g tylenol. Trauma surgery consulted.  (18 Aug 2022 01:39)      PAST MEDICAL & SURGICAL HISTORY:  Hypertension      Hyperlipidemia      H/O knee surgery        PRIMARY SURVEY:  A - airway intact  B - bilateral equal chest rise, no increased WOB  C - palpable pulses in all extremities;   D - GCS   E - exposure obtained      TERTIARY SURVEY:   GEN: resting comfortably in bed, in NAD  HEENT: normocephalic, non-tender to palpation, no abrasions visible, no step-offs palpated  NECK: no JVD, non-tender to palpation at posterior midline, no pain with flexion, extension, and bilateral neck rotation  CHEST: non-tender to palpation across clavicles and b/l anterior ribs  BACK: non-tender to palpation along cervical, thoracic, lumbar spine midline and b/l posterior ribs; no palpable step-offs or hematomas  ABD: soft, non-distended, non-tender to palpation in all quadrants without rebound tenderness or guarding  LUE: tender to palpation across upper and lower arm, 5/5  strength, fingers warm, well-perfused, full ROM in shoulder, elbow, wrist, and fingers, palpable radial pulses  RUE: tender to palpation across upper and lower arm, 5/5  strength, fingers warm, well-perfused, full ROM in shoulder, elbow, wrist, and fingers, palpable radial pulses  LLE: non-tender to palpation across upper leg, TTP lower leg; full ROM in hip, knee, ankle, and toes, 5/5 dorsiflexion + plantarflexion, palpable DP pulses; warm, well-perfused  RLE: non-tender to palpation across upper leg, TTP lower leg; full ROM in hip, knee, ankle, and toes, 5/5 dorsiflexion + plantarflexion, palpable DP pulses; warm, well-perfused  NEURO: AAOx4, no focal neuro deficits; CN II-IX intact     Medications (inpatient): cefTRIAXone   IVPB 1000 milliGRAM(s) IV Intermittent every 24 hours    Medications (PRN):acetaminophen     Tablet .. 650 milliGRAM(s) Oral every 6 hours PRN    Allergies: No Known Allergies  (Intolerances: )    Vital Signs Last 24 Hrs  T(C): 36.6 (18 Aug 2022 12:26), Max: 36.8 (17 Aug 2022 23:28)  T(F): 97.8 (18 Aug 2022 12:26), Max: 98.2 (17 Aug 2022 23:28)  HR: 85 (18 Aug 2022 12:26) (85 - 116)  BP: 111/71 (18 Aug 2022 12:26) (111/71 - 121/73)  BP(mean): --  RR: 18 (18 Aug 2022 12:26) (18 - 20)  SpO2: 98% (18 Aug 2022 12:26) (95% - 100%)    Parameters below as of 18 Aug 2022 12:26  Patient On (Oxygen Delivery Method): room air      Drug Dosing Weight  Height (cm): 162.6 (17 Aug 2022 16:27)  Weight (kg): 68 (17 Aug 2022 16:27)  BMI (kg/m2): 25.7 (17 Aug 2022 16:27)  BSA (m2): 1.73 (17 Aug 2022 16:27)                          10.2   7.07  )-----------( 430      ( 17 Aug 2022 20:11 )             33.9     08-17    140  |  100  |  12  ----------------------------<  137<H>  3.7   |  29  |  0.54    Ca    10.5      17 Aug 2022 20:11  Mg     1.8     08-17    TPro  6.7  /  Alb  3.0<L>  /  TBili  0.3  /  DBili  x   /  AST  15  /  ALT  7<L>  /  AlkPhos  95  08-17    PT/INR - ( 17 Aug 2022 20:11 )   PT: 36.5 sec;   INR: 3.11 ratio         PTT - ( 17 Aug 2022 20:11 )  PTT:45.0 sec  Urinalysis Basic - ( 17 Aug 2022 23:58 )    Color: Yellow / Appearance: Clear / SG: >1.050 / pH: x  Gluc: x / Ketone: Negative  / Bili: Negative / Urobili: 2 mg/dL   Blood: x / Protein: Trace / Nitrite: Negative   Leuk Esterase: Large / RBC: 56 /hpf /  /HPF   Sq Epi: x / Non Sq Epi: 0 /hpf / Bacteria: Many        ASSESSMENT:   86yo F on xarelto (?), unknown medical history presenting due to outpt referral from cardiology due to abnormal TTE. Trauma surgery consulted due to fall a few days ago.    Plan:  - L 10 and 11 rib fx: pain control per rib fx management protocol, encourage IS, OOB  - Pt to be admitted to medicine for cardiac workup  - if any further questions, page [phone]    ACS/Trauma Surgery  [id number]

## 2022-08-18 NOTE — H&P ADULT - NSHPLABSRESULTS_GEN_ALL_CORE
< from: CT Angio Chest PE Protocol w/ IV Cont (08.17.22 @ 20:56) >    FINDINGS:    PULMONARY ANGIOGRAM: Limited evaluation of some segmental and   subsegmental pulmonary arteries. There is no pulmonary embolism to the   level of the lobar arteries, or within the visualized segmental and   subsegmentalbranches.    LYMPH NODES: Unremarkable bilateral axillary lymph nodes. 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 Absent thyroid gland.    HEART/VASCULATURE: Cardiomegaly. No pericardial effusion. Coronary artery   calcifications. Aortic calcifications.    AIRWAYS/LUNGS/PLEURA: Central airways are patent. Scarring and   calcification in the right lung apex. Mild medial left lower lobe   atelectasis. Small left pleural effusion. Few scattered <6mm solid   pulmonary nodules bilaterally.    UPPER ABDOMEN: Large hiatal hernia. Punctate right hepatic lobe   calcification. Specifically bilateral adrenal gland thickening.    BONES/SOFT TISSUES: Degenerative changes. Acute nondisplaced left   posterior 10th and 11th rib fractures. Healed right rib fractures.   Paraspinal muscular atrophy. Mild anasarca.    IMPRESSION:    Limited evaluation of some segmental and subsegmental pulmonary arteries.   There is no pulmonary embolism to the level of the lobar arteries, or   within the visualized segmental and subsegmental branches.    Acute nondisplaced left 10th and 11th rib fractures. Small left pleural   effusion.    < end of copied text >

## 2022-08-18 NOTE — CONSULT NOTE ADULT - ASSESSMENT
Patient seen and examined, agree with above assessment and plan as transcribed above.    - referred to ER for tachycardia found with UTI.  oupt echo was limited by tachycardia   - Repeat Echo with normal LV and RV fx  - Abx per medical team    Braydon Cunningham MD, [facility]  BEEPER [phone]

## 2022-08-18 NOTE — H&P ADULT - ASSESSMENT
87F ([place] speaking, [place]  [place] [id number]) with PMH of HTN, HLD, arthritis presents to the ED for cardiac workup. Also found to have rib fractures and UTI.

## 2022-08-18 NOTE — CONSULT NOTE ADULT - SUBJECTIVE AND OBJECTIVE BOX
C A R D I O L O G Y  *********************    DATE OF SERVICE: 08-18-22    HISTORY OF PRESENT ILLNESS: HPI:  Patient is an 86 y/o Lao-speaking Female with PMH of HTN, HLD, arthritis who was sent in by cardiologist due to tachycardia and an abnormal outpatient echo with LV dysfunction found to have a UTI and Acute nondisplaced left 10th and 11th rib fractures. Cardiology consulted for further evaluation. Spoke to patient using Lao video  ID#[id number]. She recently has felt weak with decreased appetite similar to prior UTIs. Patient had a mechanical fall about a week ago when she woke up in the middle of the night to go to the bathroom when she tried to grab the doorknob, missed and fell onto her side. Patient did not hit her head or lose consciousness. She usually ambulates with a walker. Reports occasional shortness of breath and palpitations. No nausea or vomiting but decreased appetite and some weight loss. She has been having foul smelling urine. Reports LE pain. Currently denies SOB or palpitations today. Denies chest pain, dizziness, or syncope.    PAST MEDICAL & SURGICAL HISTORY:  Hypertension      Hyperlipidemia      H/O knee surgery      MEDICATIONS:  MEDICATIONS  (STANDING):  cefTRIAXone   IVPB 1000 milliGRAM(s) IV Intermittent every 24 hours  levothyroxine 100 MICROGram(s) Oral daily  metoprolol tartrate 100 milliGRAM(s) Oral two times a day  pantoprazole    Tablet 40 milliGRAM(s) Oral before breakfast  rivaroxaban 20 milliGRAM(s) Oral with dinner  simvastatin 20 milliGRAM(s) Oral at bedtime      Allergies    No Known Allergies    Intolerances        FAMILY HISTORY:  FH: heart disease (Father)      Non-contributary for premature coronary disease or sudden cardiac death    SOCIAL HISTORY:    [x ] Non-smoker  [ ] Smoker  [ ] Alcohol    FLU VACCINE THIS YEAR STARTS IN AUGUST:  [ ] Yes    [ ] No    IF OVER 65 HAVE YOU EVER HAD A PNA VACCINE:  [ ] Yes    [ ] No       [ ] N/A      REVIEW OF SYSTEMS:  [ ]chest pain  [ x ]shortness of breath  [ x ]palpitations  [  ]syncope  [ ]near syncope [ ]upper extremity weakness   [ ] lower extremity weakness  [  ]diplopia  [  ]altered mental status   [  ]fevers  [ ]chills [ ]nausea  [ ]vomiting  [  ]dysphagia    [ ]abdominal pain  [ ]melena  [ ]BRBPR    [  ]epistaxis  [  ]rash    [ ]lower extremity edema    +decreased appetite    [X] All others negative	  [ ] Unable to obtain      LABS:	 	    CARDIAC MARKERS:                              10.2   7.07  )-----------( 430      ( 17 Aug 2022 20:11 )             33.9     Hb Trend: 10.2<--    08-17    140  |  100  |  12  ----------------------------<  137<H>  3.7   |  29  |  0.54    Ca    10.5      17 Aug 2022 20:11  Mg     1.8     08-17    TPro  6.7  /  Alb  3.0<L>  /  TBili  0.3  /  DBili  x   /  AST  15  /  ALT  7<L>  /  AlkPhos  95  08-17    Creatinine Trend: 0.54<--    Coags:  PT/INR - ( 17 Aug 2022 20:11 )   PT: 36.5 sec;   INR: 3.11 ratio         PTT - ( 17 Aug 2022 20:11 )  PTT:45.0 sec    proBNP: Serum Pro-Brain Natriuretic Peptide: 337 pg/mL (08-17 @ 20:11)    Lipid Profile:   HgA1c:   TSH:         PHYSICAL EXAM:  T(C): 36.6 (08-18-22 @ 12:26), Max: 36.8 (08-17-22 @ 23:28)  HR: 88 (08-18-22 @ 13:37) (85 - 116)  BP: 105/75 (08-18-22 @ 13:37) (105/75 - 121/73)  RR: 18 (08-18-22 @ 12:26) (18 - 20)  SpO2: 98% (08-18-22 @ 13:37) (95% - 100%)  Wt(kg): --   BMI (kg/m2): 25.7 (08-17-22 @ 16:27)  I&O's Summary    18 Aug 2022 07:01  -  18 Aug 2022 15:47  --------------------------------------------------------  IN: 240 mL / OUT: 600 mL / NET: -360 mL        Gen: Appears well in NAD  HEENT:  (-)icterus (-)pallor  CV: N S1 S2 1/6 SUNDEEP (+)2 Pulses B/l  Resp:  Clear to auscultation B/L, normal effort  GI: (+) BS Soft, NT, ND  Lymph:  (-)Edema, (-)obvious lymphadenopathy  Skin: Warm to touch, Normal turgor  Psych: Appropriate mood and affect      TELEMETRY: SR/ST 90-110s	      ECG: Sinus Tachycardia, 1st degree AVB	    RADIOLOGY:  < from: CT Angio Chest PE Protocol w/ IV Cont (08.17.22 @ 20:56) >  IMPRESSION:    Limited evaluation of some segmental and subsegmental pulmonary arteries.   There is no pulmonary embolism to the level of the lobar arteries, or   within the visualized segmental and subsegmental branches.    Acute nondisplaced left 10th and 11th rib fractures. Small left pleural   effusion.    --- End of Report ---    < end of copied text >      ASSESSMENT/PLAN: Patient is an 86 y/o Lao-speaking Female with PMH of HTN, HLD, arthritis who was sent in by cardiologist due to tachycardia and an abnormal outpatient echo with LV dysfunction found to have a UTI and Acute nondisplaced left 10th and 11th rib fractures. Cardiology consulted for further evaluation.     - Monitor telemetry for Afib  - Not in clinical HF  - Unclear indication for why patient was on Xarelto at home - patient and family do not know why, will continue for now given home med  - CTA neg for PE, +L rib fx  - Continue Abx for UTI - f/u UCx  - Check LE dopplers given LE pain and to see if any sign of prior DVT  - Check TTE to eval LV function  - Further recs pending above  - Patient to f/u with Dr. Forbes on 8/30 at 1:15 PM    Prem Salazar PA-C  Pager: [phone]

## 2022-08-18 NOTE — PHYSICAL THERAPY INITIAL EVALUATION ADULT - ACTIVE RANGE OF MOTION EXAMINATION, REHAB EVAL
b/l shoulder ROM limited 2/2 arthritis/bilateral upper extremity Active ROM was WFL (within functional limits)/bilateral  lower extremity Active ROM was WFL (within functional limits)

## 2022-08-18 NOTE — H&P ADULT - PROBLEM SELECTOR PLAN 1
Patient's UA shows large LE, increased WBC, large bacteria concerning for UTI  - started on ceftriaxone  - f/u urine culture

## 2022-08-18 NOTE — H&P ADULT - NSHPSOCIALHISTORY_GEN_ALL_CORE
No smoking, no alcohol use. Recently moved from [place] 2 weeks ago, currently living with daughter. Ambulates with a walker. Daughter is having a hard time taking care of patient and would like assistance.

## 2022-08-18 NOTE — PHYSICAL THERAPY INITIAL EVALUATION ADULT - ADDITIONAL COMMENTS
Pt poor historian, as per daughter Ramya pt lives w/ her other daughter in a pvt home has 1 step at entry required some assistance w/ ADLs ambulated w/ a RW.

## 2022-08-18 NOTE — H&P ADULT - PROBLEM SELECTOR PLAN 2
CT shows acute nondisplaced left 10th and 11th rib fractures likely from recent fall  - trauma surgery recs appreciated  - pain control  - incentive spirometer  - PT consult, fall precautions  - f/u surgery recs in am

## 2022-08-18 NOTE — PHYSICAL THERAPY INITIAL EVALUATION ADULT - NSPTDISCHREC_GEN_A_CORE
however if pt to be d/c'd home would need assistance w/ all ADLs and functional mobility; home PT services for general strengthening, fall prevention, balance training, safety assessment, and to restore pt's prior level of function./Sub-acute Rehab

## 2022-08-18 NOTE — PHYSICAL THERAPY INITIAL EVALUATION ADULT - PERTINENT HX OF CURRENT PROBLEM, REHAB EVAL
88 y/o F w/ PMH of HTN, HLD, arthritis presents to the ED for cardiac workup. Also found to have rib fractures and UTI. CTA Chest: Limited evaluation of some segmental and subsegmental pulmonary arteries. There is no pulmonary embolism to the level of the lobar arteries, or within the visualized segmental and subsegmental branches. Acute nondisplaced left 10th and 11th rib fractures. Small left pleural effusion.

## 2022-08-18 NOTE — H&P ADULT - PROBLEM SELECTOR PLAN 4
- called daughter for meds who states that she provided to ED team however unable to locate med list  - patient recently from [place], does not have pharmacy here  - med rec in am

## 2022-08-19 ENCOUNTER — APPOINTMENT (OUTPATIENT)
Dept: ORTHOPEDIC SURGERY | Facility: CLINIC | Age: 87
End: 2022-08-19

## 2022-08-19 DIAGNOSIS — I48.91 UNSPECIFIED ATRIAL FIBRILLATION: ICD-10-CM

## 2022-08-19 LAB
ANION GAP SERPL CALC-SCNC: 9 MMOL/L — SIGNIFICANT CHANGE UP (ref 5–17)
APTT BLD: 35 SEC — SIGNIFICANT CHANGE UP (ref 27.5–35.5)
BUN SERPL-MCNC: 10 MG/DL — SIGNIFICANT CHANGE UP (ref 7–23)
CALCIUM SERPL-MCNC: 10 MG/DL — SIGNIFICANT CHANGE UP (ref 8.4–10.5)
CHLORIDE SERPL-SCNC: 100 MMOL/L — SIGNIFICANT CHANGE UP (ref 96–108)
CO2 SERPL-SCNC: 30 MMOL/L — SIGNIFICANT CHANGE UP (ref 22–31)
CREAT SERPL-MCNC: 0.6 MG/DL — SIGNIFICANT CHANGE UP (ref 0.5–1.3)
EGFR: 87 ML/MIN/1.73M2 — SIGNIFICANT CHANGE UP
GLUCOSE SERPL-MCNC: 140 MG/DL — HIGH (ref 70–99)
HCT VFR BLD CALC: 32.8 % — LOW (ref 34.5–45)
HGB BLD-MCNC: 9.9 G/DL — LOW (ref 11.5–15.5)
INR BLD: 1.6 RATIO — HIGH (ref 0.88–1.16)
MCHC RBC-ENTMCNC: 26.8 PG — LOW (ref 27–34)
MCHC RBC-ENTMCNC: 30.2 GM/DL — LOW (ref 32–36)
MCV RBC AUTO: 88.6 FL — SIGNIFICANT CHANGE UP (ref 80–100)
MRSA PCR RESULT.: SIGNIFICANT CHANGE UP
NRBC # BLD: 0 /100 WBCS — SIGNIFICANT CHANGE UP (ref 0–0)
PLATELET # BLD AUTO: 458 K/UL — HIGH (ref 150–400)
POTASSIUM SERPL-MCNC: 3.9 MMOL/L — SIGNIFICANT CHANGE UP (ref 3.5–5.3)
POTASSIUM SERPL-SCNC: 3.9 MMOL/L — SIGNIFICANT CHANGE UP (ref 3.5–5.3)
PROTHROM AB SERPL-ACNC: 18.6 SEC — HIGH (ref 10.5–13.4)
RBC # BLD: 3.7 M/UL — LOW (ref 3.8–5.2)
RBC # FLD: 14.9 % — HIGH (ref 10.3–14.5)
S AUREUS DNA NOSE QL NAA+PROBE: SIGNIFICANT CHANGE UP
SODIUM SERPL-SCNC: 139 MMOL/L — SIGNIFICANT CHANGE UP (ref 135–145)
WBC # BLD: 7.24 K/UL — SIGNIFICANT CHANGE UP (ref 3.8–10.5)
WBC # FLD AUTO: 7.24 K/UL — SIGNIFICANT CHANGE UP (ref 3.8–10.5)

## 2022-08-19 PROCEDURE — 93970 EXTREMITY STUDY: CPT | Mod: 26

## 2022-08-19 RX ORDER — CHLORHEXIDINE GLUCONATE 213 G/1000ML
1 SOLUTION TOPICAL
Refills: 0 | Status: DISCONTINUED | OUTPATIENT
Start: 2022-08-19 | End: 2022-08-23

## 2022-08-19 RX ADMIN — CHLORHEXIDINE GLUCONATE 1 APPLICATION(S): 213 SOLUTION TOPICAL at 11:50

## 2022-08-19 RX ADMIN — Medication 100 MILLIGRAM(S): at 05:22

## 2022-08-19 RX ADMIN — Medication 100 MICROGRAM(S): at 05:23

## 2022-08-19 RX ADMIN — RIVAROXABAN 20 MILLIGRAM(S): KIT at 17:49

## 2022-08-19 RX ADMIN — LIDOCAINE 1 PATCH: 4 CREAM TOPICAL at 18:05

## 2022-08-19 RX ADMIN — Medication 100 MILLIGRAM(S): at 17:49

## 2022-08-19 RX ADMIN — PANTOPRAZOLE SODIUM 40 MILLIGRAM(S): 20 TABLET, DELAYED RELEASE ORAL at 05:23

## 2022-08-19 RX ADMIN — SIMVASTATIN 20 MILLIGRAM(S): 20 TABLET, FILM COATED ORAL at 21:31

## 2022-08-19 RX ADMIN — LIDOCAINE 1 PATCH: 4 CREAM TOPICAL at 19:00

## 2022-08-19 NOTE — CONSULT NOTE ADULT - SUBJECTIVE AND OBJECTIVE BOX
DATE OF SERVICE: 08-19-22 @ 13:53    Patient is a 87y old  Female who presents with a chief complaint of cardiac workup (19 Aug 2022 13:41)      HPI:  87F with PMH of HTN, HLD, arthritis presents to the ED for cardiac workup. History obtained from daughter over the phone. Per daughter, patient went to her cardiologist yesterday (Dr Wicho Forbes) and was told her heart was weak and she had a fast heart rate. Daughter says that patient has also been feeling very weak and has had decreased appetite, similar to when she has a urinary tract infection. Of note, patient had a fall about a week ago when she woke up in the middle of the night to go to the bathroom when she tried to grab the doorknob, missed and fell onto her side. Patient did not hit her head or lose consciousness. She usually ambulates with a walker. No fevers or chills. Occasional shortness of breath and palpitations. No nausea or vomiting but decreased appetite and some weight loss. She has been having foul smelling urine.      PAST MEDICAL & SURGICAL HISTORY:  Hypertension      Hyperlipidemia      H/O knee surgery          Review of Systems:   CONSTITUTIONAL: No fever, weight loss, or fatigue  ENMT:  No difficulty hearing, tinnitus  NECK: No pain or stiffness  RESPIRATORY: No cough, wheezing, chills or hemoptysis; No shortness of breath  CARDIOVASCULAR: No chest pain, palpitations, dizziness, leg swelling or sob  GASTROINTESTINAL: No abdominal pain. No nausea, vomiting, diarrhea or constipation  GENITOURINARY: No dysuria, frequency, hematuria, or incontinence  NEUROLOGICAL: No headaches, memory loss, loss of strength, numbness, or tremors  SKIN: No itching, burning, rashes, or lesions   ENDOCRINE: No heat or cold intolerance;  MUSCULOSKELETAL: No joint pain or swelling; No muscle, back, or extremity pain  HEME/LYMPH: No easy bruising, or bleeding gums      Allergies    No Known Allergies    Social History: non smoker  no IVDA  no ETOH abuse   lives with family     FAMILY HISTORY:  FH: heart disease (Father)        MEDICATIONS  (STANDING):  cefTRIAXone   IVPB 1000 milliGRAM(s) IV Intermittent every 24 hours  chlorhexidine 2% Cloths 1 Application(s) Topical <User Schedule>  levothyroxine 100 MICROGram(s) Oral daily  lidocaine   4% Patch 1 Patch Transdermal daily  metoprolol tartrate 100 milliGRAM(s) Oral two times a day  pantoprazole    Tablet 40 milliGRAM(s) Oral before breakfast  rivaroxaban 20 milliGRAM(s) Oral with dinner  simvastatin 20 milliGRAM(s) Oral at bedtime    MEDICATIONS  (PRN):  acetaminophen     Tablet .. 650 milliGRAM(s) Oral every 6 hours PRN Temp greater or equal to 38C (100.4F), Mild Pain (1 - 3), Moderate Pain (4 - 6)      CAPILLARY BLOOD GLUCOSE        I&O's Summary    18 Aug 2022 07:01  -  19 Aug 2022 07:00  --------------------------------------------------------  IN: 480 mL / OUT: 1200 mL / NET: -720 mL    19 Aug 2022 07:01  -  19 Aug 2022 13:53  --------------------------------------------------------  IN: 300 mL / OUT: 0 mL / NET: 300 mL        24hrs Vital:  T(C): 36.7 (08-19-22 @ 10:54), Max: 36.8 (08-19-22 @ 04:33)  HR: 73 (08-19-22 @ 10:54) (73 - 91)  BP: 105/70 (08-19-22 @ 10:54) (105/70 - 147/73)  RR: 18 (08-19-22 @ 10:54) (18 - 18)  SpO2: 98% (08-19-22 @ 10:54) (96% - 99%)    PHYSICAL EXAM:  GENERAL: NAD, well-developed  HEAD:  Atraumatic, Normocephalic  EYES: EOMI, PERRLA,   NECK: Supple, No JVD  CHEST/LUNG: Clear  HEART: S1S2; ejection systolic murmur +   ABDOMEN: Soft, Nontender; Bowel sounds present  EXTREMITIES:  + Peripheral Pulses, No clubbing or cyanosis, no edema  NEUROLOGY: Alert, no focal motor or sensory deficits  SKIN: No rashes or lesions    LABS:                        10.2   7.07  )-----------( 430      ( 17 Aug 2022 20:11 )             33.9     08-17    140  |  100  |  12  ----------------------------<  137<H>  3.7   |  29  |  0.54    Ca    10.5      17 Aug 2022 20:11  Mg     1.8     08-17    TPro  6.7  /  Alb  3.0<L>  /  TBili  0.3  /  DBili  x   /  AST  15  /  ALT  7<L>  /  AlkPhos  95  08-17    PT/INR - ( 17 Aug 2022 20:11 )   PT: 36.5 sec;   INR: 3.11 ratio         PTT - ( 17 Aug 2022 20:11 )  PTT:45.0 sec      Urinalysis Basic - ( 17 Aug 2022 23:58 )    Color: Yellow / Appearance: Clear / SG: >1.050 / pH: x  Gluc: x / Ketone: Negative  / Bili: Negative / Urobili: 2 mg/dL   Blood: x / Protein: Trace / Nitrite: Negative   Leuk Esterase: Large / RBC: 56 /hpf /  /HPF   Sq Epi: x / Non Sq Epi: 0 /hpf / Bacteria: Many        RADIOLOGY & ADDITIONAL TESTS:    Consultant(s) Notes Reviewed:      Care Discussed with Consultants/Other Providers:

## 2022-08-19 NOTE — CONSULT NOTE ADULT - PROBLEM SELECTOR RECOMMENDATION 2
heart rate controlled  continue to follow cardiology recommendations  severe dil of LA atrium on echocardiogram

## 2022-08-19 NOTE — CONSULT NOTE ADULT - ASSESSMENT
87F (Korean speaking, [place]  ID [id number]) with PMH of HTN, HLD, arthritis presents to the ED for cardiac workup. Also found to have rib fractures and UTI.

## 2022-08-19 NOTE — PROGRESS NOTE ADULT - SUBJECTIVE AND OBJECTIVE BOX
C A R D I O L O G Y  **********************************     DATE OF SERVICE: 08-19-22    Patient reports left lower back pain. denies chest pain, palpitations, or shortness of breath.   Review of systems otherwise negative.  	  MEDICATIONS:  MEDICATIONS  (STANDING):  cefTRIAXone   IVPB 1000 milliGRAM(s) IV Intermittent every 24 hours  chlorhexidine 2% Cloths 1 Application(s) Topical <User Schedule>  levothyroxine 100 MICROGram(s) Oral daily  lidocaine   4% Patch 1 Patch Transdermal daily  metoprolol tartrate 100 milliGRAM(s) Oral two times a day  pantoprazole    Tablet 40 milliGRAM(s) Oral before breakfast  rivaroxaban 20 milliGRAM(s) Oral with dinner  simvastatin 20 milliGRAM(s) Oral at bedtime      LABS:	 	    CARDIAC MARKERS:                                10.2   7.07  )-----------( 430      ( 17 Aug 2022 20:11 )             33.9     Hemoglobin: 10.2 g/dL (08-17 @ 20:11)      08-17    140  |  100  |  12  ----------------------------<  137<H>  3.7   |  29  |  0.54    Ca    10.5      17 Aug 2022 20:11  Mg     1.8     08-17    TPro  6.7  /  Alb  3.0<L>  /  TBili  0.3  /  DBili  x   /  AST  15  /  ALT  7<L>  /  AlkPhos  95  08-17    Creatinine Trend: 0.54<--    COAGS:       proBNP:   Lipid Profile:   HgA1c:   TSH:       PHYSICAL EXAM:  T(C): 36.7 (08-19-22 @ 10:54), Max: 36.8 (08-19-22 @ 04:33)  HR: 73 (08-19-22 @ 10:54) (73 - 91)  BP: 105/70 (08-19-22 @ 10:54) (105/70 - 147/73)  RR: 18 (08-19-22 @ 10:54) (18 - 18)  SpO2: 98% (08-19-22 @ 10:54) (96% - 99%)  Wt(kg): --  I&O's Summary    18 Aug 2022 07:01  -  19 Aug 2022 07:00  --------------------------------------------------------  IN: 480 mL / OUT: 1200 mL / NET: -720 mL    19 Aug 2022 07:01  -  19 Aug 2022 13:41  --------------------------------------------------------  IN: 300 mL / OUT: 0 mL / NET: 300 mL      Gen: Appears well in NAD  HEENT:  (-)icterus (-)pallor  CV: N S1 S2 1/6 SUNDEEP (+)2 Pulses B/l  Resp:  Clear to auscultation B/L, normal effort  GI: (+) BS Soft, NT, ND  Lymph:  (-)Edema, (-)obvious lymphadenopathy  Skin: Warm to touch, Normal turgor  Psych: Appropriate mood and affect      TELEMETRY: SR 70-80s 	    ECG: Sinus Tachycardia, 1st degree AVB    < from: Transthoracic Echocardiogram (08.18.22 @ 14:01) >  Conclusions:  1. Severely dilated left atrium.  LA volume index = 55  cc/m2.  2. Normal left ventricular systolic function. No segmental  wall motion abnormalities.  3. Normal right ventricular size and function.  4. Normal tricuspid valve. Moderate tricuspid  regurgitation.  5. Estimated pulmonary artery systolic pressure equals 49  mm Hg, assuming right atrial pressure equals 8 mm Hg,  consistent with mild pulmonary pressures.  *** No previous Echo exam.    < end of copied text >    RADIOLOGY:  < from: CT Angio Chest PE Protocol w/ IV Cont (08.17.22 @ 20:56) >  IMPRESSION:    Limited evaluation of some segmental and subsegmental pulmonary arteries.   There is no pulmonary embolism to the level of the lobar arteries, or   within the visualized segmental and subsegmental branches.    Acute nondisplaced left 10th and 11th rib fractures. Small left pleural   effusion.    --- End of Report ---    < end of copied text >      ASSESSMENT/PLAN: Patient is an 86 y/o Anguillan-speaking Female with PMH of HTN, HLD, arthritis who was sent in by cardiologist due to tachycardia and an abnormal outpatient echo with LV dysfunction found to have a UTI and Acute nondisplaced left 10th and 11th rib fractures. Cardiology consulted for further evaluation.     - Monitor telemetry for Afib  - Not in clinical HF  - Unclear indication for why patient was on Xarelto at home - patient and family do not know why, will continue for now given home med  - CTA neg for PE, +L rib fx  - Trauma surgery eval appreciated  - Continue Abx for UTI per medicine - f/u UCx  - Follow up LE dopplers given LE pain and to see if any sign of prior DVT  - Repeat echo with normal LV/RV function - prior outpatient echo limited by tachycardia  - No further inpatient cardiac w/u planned  - Patient to f/u with Dr. Forbes on 8/30 at 1:15 PM    Prem Salazar PA-C  Pager: [phone]       C A R D I O L O G Y  **********************************     DATE OF SERVICE: 08-19-22    Spoke to patient using [place]  ID#[id number] (Duran). She reports left lower back pain. denies chest pain, palpitations, or shortness of breath.   Review of systems otherwise negative.  	  MEDICATIONS:  MEDICATIONS  (STANDING):  cefTRIAXone   IVPB 1000 milliGRAM(s) IV Intermittent every 24 hours  chlorhexidine 2% Cloths 1 Application(s) Topical <User Schedule>  levothyroxine 100 MICROGram(s) Oral daily  lidocaine   4% Patch 1 Patch Transdermal daily  metoprolol tartrate 100 milliGRAM(s) Oral two times a day  pantoprazole    Tablet 40 milliGRAM(s) Oral before breakfast  rivaroxaban 20 milliGRAM(s) Oral with dinner  simvastatin 20 milliGRAM(s) Oral at bedtime      LABS:	 	    CARDIAC MARKERS:                                10.2   7.07  )-----------( 430      ( 17 Aug 2022 20:11 )             33.9     Hemoglobin: 10.2 g/dL (08-17 @ 20:11)      08-17    140  |  100  |  12  ----------------------------<  137<H>  3.7   |  29  |  0.54    Ca    10.5      17 Aug 2022 20:11  Mg     1.8     08-17    TPro  6.7  /  Alb  3.0<L>  /  TBili  0.3  /  DBili  x   /  AST  15  /  ALT  7<L>  /  AlkPhos  95  08-17    Creatinine Trend: 0.54<--    COAGS:       proBNP:   Lipid Profile:   HgA1c:   TSH:       PHYSICAL EXAM:  T(C): 36.7 (08-19-22 @ 10:54), Max: 36.8 (08-19-22 @ 04:33)  HR: 73 (08-19-22 @ 10:54) (73 - 91)  BP: 105/70 (08-19-22 @ 10:54) (105/70 - 147/73)  RR: 18 (08-19-22 @ 10:54) (18 - 18)  SpO2: 98% (08-19-22 @ 10:54) (96% - 99%)  Wt(kg): --  I&O's Summary    18 Aug 2022 07:01  -  19 Aug 2022 07:00  --------------------------------------------------------  IN: 480 mL / OUT: 1200 mL / NET: -720 mL    19 Aug 2022 07:01  -  19 Aug 2022 13:41  --------------------------------------------------------  IN: 300 mL / OUT: 0 mL / NET: 300 mL      Gen: Appears well in NAD  HEENT:  (-)icterus (-)pallor  CV: N S1 S2 1/6 SUNDEEP (+)2 Pulses B/l  Resp:  Clear to auscultation B/L, normal effort  GI: (+) BS Soft, NT, ND  Lymph:  (-)Edema, (-)obvious lymphadenopathy  Skin: Warm to touch, Normal turgor  Psych: Appropriate mood and affect      TELEMETRY: SR 70-80s 	    ECG: Sinus Tachycardia, 1st degree AVB    < from: Transthoracic Echocardiogram (08.18.22 @ 14:01) >  Conclusions:  1. Severely dilated left atrium.  LA volume index = 55  cc/m2.  2. Normal left ventricular systolic function. No segmental  wall motion abnormalities.  3. Normal right ventricular size and function.  4. Normal tricuspid valve. Moderate tricuspid  regurgitation.  5. Estimated pulmonary artery systolic pressure equals 49  mm Hg, assuming right atrial pressure equals 8 mm Hg,  consistent with mild pulmonary pressures.  *** No previous Echo exam.    < end of copied text >    RADIOLOGY:  < from: CT Angio Chest PE Protocol w/ IV Cont (08.17.22 @ 20:56) >  IMPRESSION:    Limited evaluation of some segmental and subsegmental pulmonary arteries.   There is no pulmonary embolism to the level of the lobar arteries, or   within the visualized segmental and subsegmental branches.    Acute nondisplaced left 10th and 11th rib fractures. Small left pleural   effusion.    --- End of Report ---    < end of copied text >      ASSESSMENT/PLAN: Patient is an 86 y/o [place]-speaking Female with PMH of HTN, HLD, arthritis who was sent in by cardiologist due to tachycardia and an abnormal outpatient echo with LV dysfunction found to have a UTI and Acute nondisplaced left 10th and 11th rib fractures. Cardiology consulted for further evaluation.     - Check repeat labs including INR and TSH  - Monitor telemetry for Afib  - Not in clinical HF  - Unclear indication for why patient was on Xarelto at home - patient and family do not know why, will continue for now given home med  - CTA neg for PE, +L rib fx  - Trauma surgery eval appreciated  - Continue Abx for UTI per medicine - f/u UCx  - Follow up LE dopplers given LE pain and to see if any sign of prior DVT  - Repeat echo with normal LV/RV function - prior outpatient echo limited by tachycardia  - No further inpatient cardiac w/u planned  - Patient to f/u with Dr. Forbes on 8/30 at 1:15 PM    Prem Salazar PA-C  Pager: [phone]

## 2022-08-20 ENCOUNTER — TRANSCRIPTION ENCOUNTER (OUTPATIENT)
Age: 87
End: 2022-08-20

## 2022-08-20 LAB
-  AMIKACIN: SIGNIFICANT CHANGE UP
-  AMOXICILLIN/CLAVULANIC ACID: SIGNIFICANT CHANGE UP
-  AMPICILLIN/SULBACTAM: SIGNIFICANT CHANGE UP
-  AMPICILLIN: SIGNIFICANT CHANGE UP
-  AZTREONAM: SIGNIFICANT CHANGE UP
-  CEFAZOLIN: SIGNIFICANT CHANGE UP
-  CEFEPIME: SIGNIFICANT CHANGE UP
-  CEFOXITIN: SIGNIFICANT CHANGE UP
-  CEFTRIAXONE: SIGNIFICANT CHANGE UP
-  CIPROFLOXACIN: SIGNIFICANT CHANGE UP
-  ERTAPENEM: SIGNIFICANT CHANGE UP
-  GENTAMICIN: SIGNIFICANT CHANGE UP
-  IMIPENEM: SIGNIFICANT CHANGE UP
-  LEVOFLOXACIN: SIGNIFICANT CHANGE UP
-  MEROPENEM: SIGNIFICANT CHANGE UP
-  NITROFURANTOIN: SIGNIFICANT CHANGE UP
-  PIPERACILLIN/TAZOBACTAM: SIGNIFICANT CHANGE UP
-  TIGECYCLINE: SIGNIFICANT CHANGE UP
-  TOBRAMYCIN: SIGNIFICANT CHANGE UP
-  TRIMETHOPRIM/SULFAMETHOXAZOLE: SIGNIFICANT CHANGE UP
METHOD TYPE: SIGNIFICANT CHANGE UP
TSH SERPL-MCNC: 2.15 UIU/ML — SIGNIFICANT CHANGE UP (ref 0.27–4.2)

## 2022-08-20 PROCEDURE — 93010 ELECTROCARDIOGRAM REPORT: CPT

## 2022-08-20 RX ORDER — SIMVASTATIN 20 MG/1
1 TABLET, FILM COATED ORAL
Qty: 0 | Refills: 0 | DISCHARGE
Start: 2022-08-20

## 2022-08-20 RX ORDER — LEVOTHYROXINE SODIUM 125 MCG
1 TABLET ORAL
Qty: 0 | Refills: 0 | DISCHARGE
Start: 2022-08-20

## 2022-08-20 RX ORDER — METOPROLOL TARTRATE 50 MG
1 TABLET ORAL
Qty: 0 | Refills: 0 | DISCHARGE
Start: 2022-08-20

## 2022-08-20 RX ORDER — ACETAMINOPHEN 500 MG
2 TABLET ORAL
Qty: 0 | Refills: 0 | DISCHARGE
Start: 2022-08-20

## 2022-08-20 RX ORDER — LIDOCAINE 4 G/100G
1 CREAM TOPICAL
Qty: 0 | Refills: 0 | DISCHARGE
Start: 2022-08-20

## 2022-08-20 RX ORDER — RIVAROXABAN 15 MG-20MG
1 KIT ORAL
Qty: 0 | Refills: 0 | DISCHARGE
Start: 2022-08-20

## 2022-08-20 RX ORDER — METOPROLOL TARTRATE 50 MG
50 TABLET ORAL
Refills: 0 | Status: DISCONTINUED | OUTPATIENT
Start: 2022-08-20 | End: 2022-08-23

## 2022-08-20 RX ORDER — SODIUM CHLORIDE 9 MG/ML
250 INJECTION INTRAMUSCULAR; INTRAVENOUS; SUBCUTANEOUS ONCE
Refills: 0 | Status: COMPLETED | OUTPATIENT
Start: 2022-08-20 | End: 2022-08-20

## 2022-08-20 RX ORDER — PANTOPRAZOLE SODIUM 20 MG/1
1 TABLET, DELAYED RELEASE ORAL
Qty: 0 | Refills: 0 | DISCHARGE
Start: 2022-08-20

## 2022-08-20 RX ADMIN — SIMVASTATIN 20 MILLIGRAM(S): 20 TABLET, FILM COATED ORAL at 20:17

## 2022-08-20 RX ADMIN — Medication 650 MILLIGRAM(S): at 05:42

## 2022-08-20 RX ADMIN — PANTOPRAZOLE SODIUM 40 MILLIGRAM(S): 20 TABLET, DELAYED RELEASE ORAL at 05:11

## 2022-08-20 RX ADMIN — Medication 100 MICROGRAM(S): at 05:11

## 2022-08-20 RX ADMIN — Medication 100 MILLIGRAM(S): at 05:11

## 2022-08-20 RX ADMIN — Medication 650 MILLIGRAM(S): at 20:16

## 2022-08-20 RX ADMIN — LIDOCAINE 1 PATCH: 4 CREAM TOPICAL at 05:14

## 2022-08-20 RX ADMIN — Medication 650 MILLIGRAM(S): at 12:30

## 2022-08-20 RX ADMIN — Medication 650 MILLIGRAM(S): at 05:12

## 2022-08-20 RX ADMIN — Medication 650 MILLIGRAM(S): at 11:36

## 2022-08-20 RX ADMIN — CHLORHEXIDINE GLUCONATE 1 APPLICATION(S): 213 SOLUTION TOPICAL at 05:11

## 2022-08-20 RX ADMIN — LIDOCAINE 1 PATCH: 4 CREAM TOPICAL at 16:45

## 2022-08-20 RX ADMIN — RIVAROXABAN 20 MILLIGRAM(S): KIT at 17:29

## 2022-08-20 RX ADMIN — Medication 650 MILLIGRAM(S): at 21:16

## 2022-08-20 RX ADMIN — CEFTRIAXONE 100 MILLIGRAM(S): 500 INJECTION, POWDER, FOR SOLUTION INTRAMUSCULAR; INTRAVENOUS at 00:03

## 2022-08-20 RX ADMIN — SODIUM CHLORIDE 1000 MILLILITER(S): 9 INJECTION INTRAMUSCULAR; INTRAVENOUS; SUBCUTANEOUS at 13:53

## 2022-08-20 RX ADMIN — LIDOCAINE 1 PATCH: 4 CREAM TOPICAL at 11:31

## 2022-08-20 NOTE — DISCHARGE NOTE PROVIDER - HOSPITAL COURSE
87F (Georgian speaking, [place]  ID [id number]) with PMH of HTN, HLD, arthritis presents to the ED for cardiac workup. Also found to have rib fractures and UTI.     Problem/Plan - 1:  ·  Problem: Acute UTI.   ·  Plan: urine culture testing   growing E Coli and Klebsiella  will continue to monitor  completed antibiotics.     Problem/Plan - 2:  ·  Problem: Atrial fibrillation.   ·  Plan: heart rate controlled   now in NSR  will continue to monitor   continue rivaroxaban.     Problem/Plan - 3:  ·  Problem: Rib fracture.   ·  Plan: pain well controlled  no intervention at this time.     Problem/Plan - 4:  ·  Problem: Tachycardia.   ·  Plan: resolved.     87F (Maori speaking, [place]  ID [id number]) with PMH of HTN, HLD, arthritis presents to the ED for cardiac workup. Also found to have rib fractures and UTI.     Problem/Plan - 1:  ·  Problem: Acute UTI.   ·  Plan: urine culture testing   growing E Coli and Klebsiella  will continue to monitor  completed antibiotics.     Problem/Plan - 2:  ·  Problem: Atrial fibrillation.   ·  Plan: heart rate controlled   now in NSR  will continue to monitor   continue rivaroxaban.     Problem/Plan - 3:  ·  Problem: Rib fracture.   ·  Plan: pain well controlled  no intervention at this time.     Problem/Plan - 4:  ·  Problem: Tachycardia.   ·  Plan: resolved.    87F (Maori speaking, [place]  ID [id number]) with PMH of HTN, HLD, arthritis presents to the ED for cardiac workup. Also found to have rib fractures and UTI.     Problem/Plan - 1:  ·  Problem: Acute UTI.   ·  Plan: urine culture testing   growing E Coli and Klebsiella  will continue to monitor  completed antibiotics.     Problem/Plan - 2:  ·  Problem: Atrial fibrillation.   ·  Plan: heart rate controlled   now in NSR  will continue to monitor   continue rivaroxaban.     Problem/Plan - 3:  ·  Problem: Rib fracture.   ·  Plan: pain well controlled  no intervention at this time.     Problem/Plan - 4:  ·  Problem: Tachycardia.   ·  Plan: resolved.         87F (Kazakh speaking, [place]  ID [id number]) with PMH of HTN, HLD, arthritis presents to the ED for cardiac workup. Also found to have rib fractures and UTI.     Problem/Plan - 1:  ·  Problem: Acute UTI.   ·  Plan: urine culture positive for E Coli and Klebsiella sensitive to ceftriaxone   will continue to monitor  no further hypotension  blood cultures testing   repeat UA negative   CXR repeat appears negative   official read pending.     Problem/Plan - 2:  ·  Problem: Atrial fibrillation.   ·  Plan: heart rate controlled   now in NSR  will continue to monitor   continue rivaroxaban.     Problem/Plan - 3:  ·  Problem: Rib fracture.   ·  Plan: pain well controlled  no intervention at this time.     Problem/Plan - 4:  ·  Problem: Preventive measure.   ·  Plan: on rivaroxaban.    Patient stable for discharge to rehab.  Follow up with Dr Forbes on 8/30/22 at 1:15 pm 87F (Frisian speaking, [place]  ID [id number]) with PMH of HTN, HLD, arthritis presents to the ED for cardiac workup. Also found to have rib fractures and UTI.     Problem/Plan - 1:  ·  Problem: Acute UTI.   ·  Plan: urine culture positive for E Coli and Klebsiella sensitive to ceftriaxone   will continue to monitor  no further hypotension  blood cultures testing   CXR repeat negative.     Problem/Plan - 2:  ·  Problem: Atrial fibrillation.   ·  Plan: heart rate controlled   now in NSR  will continue to monitor   continue rivaroxaban.     Problem/Plan - 3:  ·  Problem: Rib fracture.   ·  Plan: pain well controlled  no intervention at this time      Patient stable for discharge to rehab.  Follow up with Dr Forbes on 8/30/22 at 1:15 pm

## 2022-08-20 NOTE — PROGRESS NOTE ADULT - SUBJECTIVE AND OBJECTIVE BOX
C A R D I O L O G Y  **********************************     DATE OF SERVICE: 08-20          acetaminophen     Tablet .. 650 milliGRAM(s) Oral every 6 hours PRN  chlorhexidine 2% Cloths 1 Application(s) Topical <User Schedule>  levothyroxine 100 MICROGram(s) Oral daily  lidocaine   4% Patch 1 Patch Transdermal daily  metoprolol tartrate 100 milliGRAM(s) Oral two times a day  pantoprazole    Tablet 40 milliGRAM(s) Oral before breakfast  rivaroxaban 20 milliGRAM(s) Oral with dinner  simvastatin 20 milliGRAM(s) Oral at bedtime                            9.9    7.24  )-----------( 458      ( 19 Aug 2022 14:13 )             32.8       Hemoglobin: 9.9 g/dL (08-19 @ 14:13)  Hemoglobin: 10.2 g/dL (08-17 @ 20:11)      08-19    139  |  100  |  10  ----------------------------<  140<H>  3.9   |  30  |  0.60    Ca    10.0      19 Aug 2022 14:13      Creatinine Trend: 0.60<--, 0.54<--    COAGS: PT/INR - ( 19 Aug 2022 14:13 )   PT: 18.6 sec;   INR: 1.60 ratio         PTT - ( 19 Aug 2022 14:13 )  PTT:35.0 sec          T(C): 36.8 (08-20-22 @ 05:03), Max: 37.1 (08-19-22 @ 20:21)  HR: 78 (08-20-22 @ 05:03) (73 - 80)  BP: 138/76 (08-20-22 @ 05:03) (105/70 - 138/76)  RR: 18 (08-20-22 @ 05:03) (18 - 18)  SpO2: 97% (08-20-22 @ 05:03) (93% - 98%)  Wt(kg): --    I&O's Summary    19 Aug 2022 07:01  -  20 Aug 2022 07:00  --------------------------------------------------------  IN: 450 mL / OUT: 0 mL / NET: 450 mL    20 Aug 2022 07:01  -  20 Aug 2022 09:45  --------------------------------------------------------  IN: 240 mL / OUT: 0 mL / NET: 240 mL      Gen: Appears well in NAD  HEENT:  (-)icterus (-)pallor  CV: N S1 S2 1/6 SUNDEEP (+)2 Pulses B/l  Resp:  Clear to auscultation B/L, normal effort  GI: (+) BS Soft, NT, ND  Lymph:  (-)Edema, (-)obvious lymphadenopathy  Skin: Warm to touch, Normal turgor  Psych: Appropriate mood and affect      TELEMETRY: SR 70-80s 	    ECG: Sinus Tachycardia, 1st degree AVB    < from: Transthoracic Echocardiogram (08.18.22 @ 14:01) >  Conclusions:  1. Severely dilated left atrium.  LA volume index = 55  cc/m2.  2. Normal left ventricular systolic function. No segmental  wall motion abnormalities.  3. Normal right ventricular size and function.  4. Normal tricuspid valve. Moderate tricuspid  regurgitation.  5. Estimated pulmonary artery systolic pressure equals 49  mm Hg, assuming right atrial pressure equals 8 mm Hg,  consistent with mild pulmonary pressures.  *** No previous Echo exam.    < end of copied text >    RADIOLOGY:  < from: CT Angio Chest PE Protocol w/ IV Cont (08.17.22 @ 20:56) >  IMPRESSION:    Limited evaluation of some segmental and subsegmental pulmonary arteries.   There is no pulmonary embolism to the level of the lobar arteries, or   within the visualized segmental and subsegmental branches.    Acute nondisplaced left 10th and 11th rib fractures. Small left pleural   effusion.    --- End of Report ---    < end of copied text >      ASSESSMENT/PLAN: Patient is an 86 y/o Malian-speaking Female with PMH of HTN, HLD, arthritis who was sent in by cardiologist due to tachycardia and an abnormal outpatient echo with LV dysfunction found to have a UTI and Acute nondisplaced left 10th and 11th rib fractures. Cardiology consulted for further evaluation.      - tele stable , Sinus   - Not in clinical HF  - cont Abx   - CTA neg for PE, +L rib fx    - duplex neg for DVT.   - No further inpatient cardiac w/u planned  - Patient to f/u with Dr. Forbes on 8/30 at 1:15 PM

## 2022-08-20 NOTE — DISCHARGE NOTE PROVIDER - CARE PROVIDER_API CALL
Wicho Forbes  CARDIOLOGY  [address], [address]  [place] [address]  Phone: [phone]  Fax: [phone]  Follow Up Time: 2 weeks   Wicho Forbes  CARDIOLOGY  [address], [address]  [place] [address]  Phone: [phone]  Fax: [phone]  Established Patient  Scheduled Appointment: 08/30/2022 01:00 PM

## 2022-08-20 NOTE — PROGRESS NOTE ADULT - ASSESSMENT
CARDIOLOGY ATTENDING    Agree with above. No further inpatient cardiac workup needed. F/U with Leidy on 8/30 at 115pm

## 2022-08-20 NOTE — PROGRESS NOTE ADULT - NSPROGADDITIONALINFOA_GEN_ALL_CORE
discussed with patient's daughter Leonora  in detail  she is amenable to Subacute Rehab for patient discussed with patient's daughter Leonora  in detail  she is amenable to Subacute Rehab for patient  stable for discharge to Subacute Rehab

## 2022-08-20 NOTE — DISCHARGE NOTE PROVIDER - NSDCMRMEDTOKEN_GEN_ALL_CORE_FT
acetaminophen 325 mg oral tablet: 2 tab(s) orally every 6 hours, As needed, Temp greater or equal to 38C (100.4F), Mild Pain (1 - 3), Moderate Pain (4 - 6)  levothyroxine 100 mcg (0.1 mg) oral tablet: 1 tab(s) orally once a day  lidocaine 4% topical film: Apply topically to affected area once a day, As Needed  metoprolol tartrate 100 mg oral tablet: 1 tab(s) orally 2 times a day  pantoprazole 40 mg oral delayed release tablet: 1 tab(s) orally once a day (before a meal)  rivaroxaban 20 mg oral tablet: 1 tab(s) orally once a day (before a meal)  simvastatin 20 mg oral tablet: 1 tab(s) orally once a day (at bedtime)  Vitamin D3: 1 tab(s) orally once a day   acetaminophen 325 mg oral tablet: 2 tab(s) orally every 6 hours, As needed, Temp greater or equal to 38C (100.4F), Mild Pain (1 - 3), Moderate Pain (4 - 6)  levothyroxine 100 mcg (0.1 mg) oral tablet: 1 tab(s) orally once a day  lidocaine 4% topical film: Apply topically to affected area once a day, As Needed  metoprolol tartrate 50 mg oral tablet: 1 tab(s) orally 2 times a day  pantoprazole 40 mg oral delayed release tablet: 1 tab(s) orally once a day (before a meal)  rivaroxaban 20 mg oral tablet: 1 tab(s) orally once a day (before a meal)  simvastatin 20 mg oral tablet: 1 tab(s) orally once a day (at bedtime)  Vitamin D3: 1 tab(s) orally once a day

## 2022-08-20 NOTE — PROGRESS NOTE ADULT - ASSESSMENT
87F (Persian speaking, [place]  ID [id number]) with PMH of HTN, HLD, arthritis presents to the ED for cardiac workup. Also found to have rib fractures and UTI.

## 2022-08-20 NOTE — DISCHARGE NOTE PROVIDER - PROVIDER TOKENS
PROVIDER:[TOKEN:[[id number]],FOLLOWUP:[2 weeks]] PROVIDER:[TOKEN:[[id number]],SCHEDULEDAPPT:[08/30/2022],SCHEDULEDAPPTTIME:[01:00 PM],ESTABLISHEDPATIENT:[T]]

## 2022-08-20 NOTE — DISCHARGE NOTE PROVIDER - NSDCCPCAREPLAN_GEN_ALL_CORE_FT
PRINCIPAL DISCHARGE DIAGNOSIS  Diagnosis: Rib fracture  Assessment and Plan of Treatment: due to fall; rest      SECONDARY DISCHARGE DIAGNOSES  Diagnosis: Acute UTI  Assessment and Plan of Treatment: Antibiotics completed inpatient     PRINCIPAL DISCHARGE DIAGNOSIS  Diagnosis: Fall  Assessment and Plan of Treatment: HOME CARE INSTRUCTIONS  Have someone stay with you until you feel stable.  Do not drive, operate machinery, or play sports until your caregiver says it is okay.  Keep all follow-up appointments as directed by your caregiver.   Lie down right away if you start feeling like you might faint. Breathe deeply and steadily. Wait until all the symptoms have passed.Drink enough fluids to keep your urine clear or pale yellow.  If you are taking blood pressure or heart medicine, get up slowly, taking several minutes to sit and then stand. This can reduce dizziness.  SEEK IMMEDIATE MEDICAL CARE IF:  You have a severe headache.  You have unusual pain in the chest, abdomen, or back.  You are bleeding from the mouth or rectum, or you have black or tarry stool.  You have an irregular or very fast heartbeat.  You have pain with breathing.  You have repeated fainting or seizure-like jerking during an episode.  You faint when sitting or lying down.  You have confusion.  You have difficulty walking.  You have severe weakness.  You have vision problems.        SECONDARY DISCHARGE DIAGNOSES  Diagnosis: Acute UTI  Assessment and Plan of Treatment: HOME CARE INSTRUCTIONS  Antibiotics completed   Drink enough water and fluids to keep your urine clear or pale yellow.  Avoid caffeine, tea, and carbonated beverages. They tend to irritate your bladder.  Empty your bladder often. Avoid holding urine for long periods of time.  Empty your bladder before and after sexual intercourse.  After a bowel movement, women should cleanse from front to back. Use each tissue only once.  SEEK MEDICAL CARE IF:  You have back pain.  You develop a fever.  Your symptoms do not begin to resolve within 3 days.  SEEK IMMEDIATE MEDICAL CARE IF:  You have severe back pain or lower abdominal pain.  You develop chills.  You have nausea or vomiting.  You have continued burning or discomfort with urination.      Diagnosis: Rib fracture  Assessment and Plan of Treatment: Continue rest, pain medication.   Follow up with PMD for managment    Diagnosis: Chronic atrial fibrillation  Assessment and Plan of Treatment: Atrial fibrillation is the most common heart rhythm problem.  The condition puts you at risk for has stroke and heart attack  It helps if you control your blood pressure, not drink more than 1-2 alcohol drinks per day, cut down on caffeine, getting treatment for over active thyroid gland, and get regular exercise  Call your doctor if you feel your heart racing or beating unusually, chest tightness or pain, lightheaded, faint, shortness of breath especially with exercise  It is important to take your heart medication as prescribed  You may be on anticoagulation which is very important to take as directed - you may need blood work to monitor drug levels      Diagnosis: HTN (hypertension)  Assessment and Plan of Treatment: Follow up with your medical doctor to establish long term blood pressure treatment goals.       PRINCIPAL DISCHARGE DIAGNOSIS  Diagnosis: Fall  Assessment and Plan of Treatment: HOME CARE INSTRUCTIONS  Have someone stay with you until you feel stable.  Do not drive, operate machinery, or play sports until your caregiver says it is okay.  Keep all follow-up appointments as directed by your caregiver.   Lie down right away if you start feeling like you might faint. Breathe deeply and steadily. Wait until all the symptoms have passed.Drink enough fluids to keep your urine clear or pale yellow.  If you are taking blood pressure or heart medicine, get up slowly, taking several minutes to sit and then stand. This can reduce dizziness.  SEEK IMMEDIATE MEDICAL CARE IF:  You have a severe headache.  You have unusual pain in the chest, abdomen, or back.  You are bleeding from the mouth or rectum, or you have black or tarry stool.  You have an irregular or very fast heartbeat.  You have pain with breathing.  You have repeated fainting or seizure-like jerking during an episode.  You faint when sitting or lying down.  You have confusion.  You have difficulty walking.  You have severe weakness.  You have vision problems.        SECONDARY DISCHARGE DIAGNOSES  Diagnosis: Acute UTI  Assessment and Plan of Treatment: Antibiotics completed   Drink enough water and fluids to keep your urine clear or pale yellow.  Avoid caffeine, tea, and carbonated beverages. They tend to irritate your bladder.  Empty your bladder often. Avoid holding urine for long periods of time.  Empty your bladder before and after sexual intercourse.  After a bowel movement, women should cleanse from front to back. Use each tissue only once.  SEEK MEDICAL CARE IF:  You have back pain.  You develop a fever.  Your symptoms do not begin to resolve within 3 days.  SEEK IMMEDIATE MEDICAL CARE IF:  You have severe back pain or lower abdominal pain.  You develop chills.  You have nausea or vomiting.  You have continued burning or discomfort with urination.      Diagnosis: Rib fracture  Assessment and Plan of Treatment: Continue rest, pain medication.   Follow up with PMD for managment    Diagnosis: Chronic atrial fibrillation  Assessment and Plan of Treatment: You have a follow up appointment with Dr Forbes on 8/30/22 at 1:15 pm.  You will need outpatient cardiac monitoring, cardiology will arrange that for you.   Atrial fibrillation is the most common heart rhythm problem.  The condition puts you at risk for has stroke and heart attack  It helps if you control your blood pressure, not drink more than 1-2 alcohol drinks per day, cut down on caffeine, getting treatment for over active thyroid gland, and get regular exercise  Call your doctor if you feel your heart racing or beating unusually, chest tightness or pain, lightheaded, faint, shortness of breath especially with exercise  It is important to take your heart medication as prescribed  You may be on anticoagulation which is very important to take as directed - you may need blood work to monitor drug levels      Diagnosis: HTN (hypertension)  Assessment and Plan of Treatment: Low salt diet  Activity as tolerated.  Take all medication as prescribed.  Follow up with your medical doctor for routine blood pressure monitoring at your next visit.  Notify your doctor if you have any of the following symptoms:   Dizziness, Lightheadedness, Blurry vision, Headache, Chest pain, Shortness of breath

## 2022-08-21 LAB
-  AMIKACIN: SIGNIFICANT CHANGE UP
-  AMOXICILLIN/CLAVULANIC ACID: SIGNIFICANT CHANGE UP
-  AMPICILLIN/SULBACTAM: SIGNIFICANT CHANGE UP
-  AMPICILLIN: SIGNIFICANT CHANGE UP
-  AZTREONAM: SIGNIFICANT CHANGE UP
-  CEFAZOLIN: SIGNIFICANT CHANGE UP
-  CEFEPIME: SIGNIFICANT CHANGE UP
-  CEFOXITIN: SIGNIFICANT CHANGE UP
-  CEFTRIAXONE: SIGNIFICANT CHANGE UP
-  CIPROFLOXACIN: SIGNIFICANT CHANGE UP
-  ERTAPENEM: SIGNIFICANT CHANGE UP
-  GENTAMICIN: SIGNIFICANT CHANGE UP
-  IMIPENEM: SIGNIFICANT CHANGE UP
-  LEVOFLOXACIN: SIGNIFICANT CHANGE UP
-  MEROPENEM: SIGNIFICANT CHANGE UP
-  NITROFURANTOIN: SIGNIFICANT CHANGE UP
-  PIPERACILLIN/TAZOBACTAM: SIGNIFICANT CHANGE UP
-  TIGECYCLINE: SIGNIFICANT CHANGE UP
-  TOBRAMYCIN: SIGNIFICANT CHANGE UP
-  TRIMETHOPRIM/SULFAMETHOXAZOLE: SIGNIFICANT CHANGE UP
ANION GAP SERPL CALC-SCNC: 10 MMOL/L — SIGNIFICANT CHANGE UP (ref 5–17)
BUN SERPL-MCNC: 13 MG/DL — SIGNIFICANT CHANGE UP (ref 7–23)
CALCIUM SERPL-MCNC: 9.8 MG/DL — SIGNIFICANT CHANGE UP (ref 8.4–10.5)
CHLORIDE SERPL-SCNC: 101 MMOL/L — SIGNIFICANT CHANGE UP (ref 96–108)
CO2 SERPL-SCNC: 28 MMOL/L — SIGNIFICANT CHANGE UP (ref 22–31)
CREAT SERPL-MCNC: 0.48 MG/DL — LOW (ref 0.5–1.3)
CULTURE RESULTS: SIGNIFICANT CHANGE UP
EGFR: 92 ML/MIN/1.73M2 — SIGNIFICANT CHANGE UP
FERRITIN SERPL-MCNC: 117 NG/ML — SIGNIFICANT CHANGE UP (ref 15–150)
FOLATE SERPL-MCNC: 4.3 NG/ML — LOW
GLUCOSE SERPL-MCNC: 86 MG/DL — SIGNIFICANT CHANGE UP (ref 70–99)
HCT VFR BLD CALC: 33.2 % — LOW (ref 34.5–45)
HGB BLD-MCNC: 10 G/DL — LOW (ref 11.5–15.5)
IRON SATN MFR SERPL: 10 % — LOW (ref 14–50)
IRON SATN MFR SERPL: 22 UG/DL — LOW (ref 30–160)
MCHC RBC-ENTMCNC: 27 PG — SIGNIFICANT CHANGE UP (ref 27–34)
MCHC RBC-ENTMCNC: 30.1 GM/DL — LOW (ref 32–36)
MCV RBC AUTO: 89.5 FL — SIGNIFICANT CHANGE UP (ref 80–100)
METHOD TYPE: SIGNIFICANT CHANGE UP
NRBC # BLD: 0 /100 WBCS — SIGNIFICANT CHANGE UP (ref 0–0)
ORGANISM # SPEC MICROSCOPIC CNT: SIGNIFICANT CHANGE UP
PLATELET # BLD AUTO: 370 K/UL — SIGNIFICANT CHANGE UP (ref 150–400)
POTASSIUM SERPL-MCNC: 4 MMOL/L — SIGNIFICANT CHANGE UP (ref 3.5–5.3)
POTASSIUM SERPL-SCNC: 4 MMOL/L — SIGNIFICANT CHANGE UP (ref 3.5–5.3)
RBC # BLD: 3.71 M/UL — LOW (ref 3.8–5.2)
RBC # FLD: 15.1 % — HIGH (ref 10.3–14.5)
SARS-COV-2 RNA SPEC QL NAA+PROBE: SIGNIFICANT CHANGE UP
SODIUM SERPL-SCNC: 139 MMOL/L — SIGNIFICANT CHANGE UP (ref 135–145)
SPECIMEN SOURCE: SIGNIFICANT CHANGE UP
TIBC SERPL-MCNC: 211 UG/DL — LOW (ref 220–430)
UIBC SERPL-MCNC: 189 UG/DL — SIGNIFICANT CHANGE UP (ref 110–370)
VIT B12 SERPL-MCNC: 681 PG/ML — SIGNIFICANT CHANGE UP (ref 232–1245)
WBC # BLD: 6.37 K/UL — SIGNIFICANT CHANGE UP (ref 3.8–10.5)
WBC # FLD AUTO: 6.37 K/UL — SIGNIFICANT CHANGE UP (ref 3.8–10.5)

## 2022-08-21 PROCEDURE — 71045 X-RAY EXAM CHEST 1 VIEW: CPT | Mod: 26

## 2022-08-21 RX ORDER — LANOLIN ALCOHOL/MO/W.PET/CERES
1 CREAM (GRAM) TOPICAL ONCE
Refills: 0 | Status: COMPLETED | OUTPATIENT
Start: 2022-08-21 | End: 2022-08-21

## 2022-08-21 RX ORDER — METOPROLOL TARTRATE 50 MG
1 TABLET ORAL
Qty: 60 | Refills: 0
Start: 2022-08-21 | End: 2022-09-19

## 2022-08-21 RX ADMIN — Medication 50 MILLIGRAM(S): at 05:15

## 2022-08-21 RX ADMIN — CHLORHEXIDINE GLUCONATE 1 APPLICATION(S): 213 SOLUTION TOPICAL at 05:15

## 2022-08-21 RX ADMIN — LIDOCAINE 1 PATCH: 4 CREAM TOPICAL at 22:00

## 2022-08-21 RX ADMIN — Medication 50 MILLIGRAM(S): at 17:04

## 2022-08-21 RX ADMIN — Medication 1 MILLIGRAM(S): at 00:08

## 2022-08-21 RX ADMIN — PANTOPRAZOLE SODIUM 40 MILLIGRAM(S): 20 TABLET, DELAYED RELEASE ORAL at 05:15

## 2022-08-21 RX ADMIN — SIMVASTATIN 20 MILLIGRAM(S): 20 TABLET, FILM COATED ORAL at 22:05

## 2022-08-21 RX ADMIN — LIDOCAINE 1 PATCH: 4 CREAM TOPICAL at 00:00

## 2022-08-21 RX ADMIN — RIVAROXABAN 20 MILLIGRAM(S): KIT at 17:05

## 2022-08-21 RX ADMIN — LIDOCAINE 1 PATCH: 4 CREAM TOPICAL at 19:44

## 2022-08-21 RX ADMIN — Medication 100 MICROGRAM(S): at 05:15

## 2022-08-21 RX ADMIN — LIDOCAINE 1 PATCH: 4 CREAM TOPICAL at 11:06

## 2022-08-21 NOTE — PROVIDER CONTACT NOTE (OTHER) - ASSESSMENT
Patient VSS on room air, A&Ox4, Indonesian speaking,  phone used. Denies chest pain, no tachypnea. Neuro assessment WDL. She states this feeling started 5 minutes prior and is the first time she has felt this way. Pt noted moving tongue frequently and frequent swallowing, patient states she does this when she's nervous. Pt appears mildly anxious and requesting to sit in the chair for possible relief in discomfort.

## 2022-08-21 NOTE — PROGRESS NOTE ADULT - SUBJECTIVE AND OBJECTIVE BOX
C A R D I O L O G Y  **********************************     DATE OF SERVICE: 08-21         acetaminophen     Tablet .. 650 milliGRAM(s) Oral every 6 hours PRN  chlorhexidine 2% Cloths 1 Application(s) Topical <User Schedule>  levothyroxine 100 MICROGram(s) Oral daily  lidocaine   4% Patch 1 Patch Transdermal daily  metoprolol tartrate 50 milliGRAM(s) Oral two times a day  pantoprazole    Tablet 40 milliGRAM(s) Oral before breakfast  rivaroxaban 20 milliGRAM(s) Oral with dinner  simvastatin 20 milliGRAM(s) Oral at bedtime                            9.9    7.24  )-----------( 458      ( 19 Aug 2022 14:13 )             32.8       Hemoglobin: 9.9 g/dL (08-19 @ 14:13)  Hemoglobin: 10.2 g/dL (08-17 @ 20:11)      08-19    139  |  100  |  10  ----------------------------<  140<H>  3.9   |  30  |  0.60    Ca    10.0      19 Aug 2022 14:13      Creatinine Trend: 0.60<--, 0.54<--    COAGS:           T(C): 36.6 (08-21-22 @ 04:58), Max: 36.6 (08-20-22 @ 11:34)  HR: 82 (08-21-22 @ 04:58) (65 - 82)  BP: 129/83 (08-21-22 @ 04:58) (82/60 - 134/77)  RR: 18 (08-21-22 @ 04:58) (18 - 19)  SpO2: 98% (08-21-22 @ 04:58) (97% - 98%)  Wt(kg): --    I&O's Summary    20 Aug 2022 07:01  -  21 Aug 2022 07:00  --------------------------------------------------------  IN: 780 mL / OUT: 0 mL / NET: 780 mL      Gen: Appears well in NAD  HEENT:  (-)icterus (-)pallor  CV: N S1 S2 1/6 SUNDEEP (+)2 Pulses B/l  Resp:  Clear to auscultation B/L, normal effort  GI: (+) BS Soft, NT, ND  Lymph:  (-)Edema, (-)obvious lymphadenopathy  Skin: Warm to touch, Normal turgor  Psych: Appropriate mood and affect      TELEMETRY: SR 70-80s 	    ECG: Sinus Tachycardia, 1st degree AVB    < from: Transthoracic Echocardiogram (08.18.22 @ 14:01) >  Conclusions:  1. Severely dilated left atrium.  LA volume index = 55  cc/m2.  2. Normal left ventricular systolic function. No segmental  wall motion abnormalities.  3. Normal right ventricular size and function.  4. Normal tricuspid valve. Moderate tricuspid  regurgitation.  5. Estimated pulmonary artery systolic pressure equals 49  mm Hg, assuming right atrial pressure equals 8 mm Hg,  consistent with mild pulmonary pressures.  *** No previous Echo exam.    < end of copied text >    RADIOLOGY:  < from: CT Angio Chest PE Protocol w/ IV Cont (08.17.22 @ 20:56) >  IMPRESSION:    Limited evaluation of some segmental and subsegmental pulmonary arteries.   There is no pulmonary embolism to the level of the lobar arteries, or   within the visualized segmental and subsegmental branches.    Acute nondisplaced left 10th and 11th rib fractures. Small left pleural   effusion.    --- End of Report ---    < end of copied text >      ASSESSMENT/PLAN: Patient is an 88 y/o Israeli-speaking Female with PMH of HTN, HLD, arthritis who was sent in by cardiologist due to tachycardia and an abnormal outpatient echo with LV dysfunction found to have a UTI and Acute nondisplaced left 10th and 11th rib fractures. Cardiology consulted for further evaluation.   -ecg with no acute ischemia this am ,  - cont melatonin prn    - tele stable , Sinus   - Not in clinical HF  - cont Abx   - CTA neg for PE, +L rib fx    - duplex neg for DVT.   - No further inpatient cardiac w/u planned  - Patient to f/u with Dr. Forbes on 8/30 at 1:15 PM

## 2022-08-21 NOTE — PROVIDER CONTACT NOTE (OTHER) - BACKGROUND
Patient was admitted s/p fall with L10, 11 rib fracture. Discharge cancelled today for episode of hypotension.

## 2022-08-21 NOTE — CHART NOTE - NSCHARTNOTEFT_GEN_A_CORE
Medicine PA Note     ELÍAS XIONG  MRN-[id number]  Allergies    No Known Allergies    Intolerances       Called to evaluate patient for "feeling uncomfortable". Patient seen and evaluated at bedside. Conversed with patient using Croatian  Tori #[id number]. Patient states that she woke up and was feeling uncomfortable. Upon further questioning, patient mainly uncomfortable with how she is positioned in bed and thinks she would feel better if she sat in a bedside chair. Patient also states that she felt short of breath however is on room air, not tachypneic and speaking in full sentences. However does appear to be mildly anxious. Pt told nursing staff her feet were numb however when asked about this patient no longer complains of it. Pt also moving tongue frequently, states that she has done this since she was a child whenever she is nervous. Otherwise denies chest pain, dizziness, N/V/D.    Vital Signs Last 24 Hrs  T(C): 36.6 (08-20-22 @ 23:31), Max: 36.8 (08-20-22 @ 05:03)  T(F): 97.8 (08-20-22 @ 23:31), Max: 98.3 (08-20-22 @ 05:03)  HR: 77 (08-20-22 @ 23:31) (65 - 79)  BP: 134/77 (08-20-22 @ 23:31) (82/60 - 138/76)  BP(mean): --  RR: 19 (08-20-22 @ 23:31) (18 - 19)  SpO2: 97% (08-20-22 @ 23:31) (95% - 98%)  Daily     Daily   I&O's Summary    19 Aug 2022 07:01  -  20 Aug 2022 07:00  --------------------------------------------------------  IN: 450 mL / OUT: 0 mL / NET: 450 mL    20 Aug 2022 07:01  -  21 Aug 2022 00:14  --------------------------------------------------------  IN: 780 mL / OUT: 0 mL / NET: 780 mL      CAPILLARY BLOOD GLUCOSE                          9.9    7.24  )-----------( 458      ( 19 Aug 2022 14:13 )             32.8     08-19    139  |  100  |  10  ----------------------------<  140<H>  3.9   |  30  |  0.60    Ca    10.0      19 Aug 2022 14:13      PT/INR - ( 19 Aug 2022 14:13 )   PT: 18.6 sec;   INR: 1.60 ratio         PTT - ( 19 Aug 2022 14:13 )  PTT:35.0 sec      PHYSICAL EXAM:  GENERAL: Anxious  EYES: EOMI, PERRLA,  CHEST/LUNG: Clear to auscultation bilaterally  HEART: Regular rate and rhythm  ABDOMEN: Soft, Nontender, Nondistended; Bowel sounds present  EXTREMITIES:  2+ Peripheral Pulses, No clubbing, cyanosis, or edema  PSYCH: AAOx3  NEUROLOGY: non-focal    Assessment/Plan: HPI:    87F (Croatian speaking, [place]  ID [id number]) with PMH of HTN, HLD, arthritis presents to the ED for cardiac workup. Also found to have rib fractures and UTI.    #Anxiety, likely driving patient's feeling of being uncomfortable despite stable vital signs and benign physical exam.   -Patient to try sitting in bedside chair. Will eventually be moved to bed. Pt aware that she can not stay the whole night in the chair.  -Melatonin ordered.  -EKG performed with no changes compared to previous EKG. No recent events on tele monitoring.  -Will continue to closely follow and assess.    -Mj Mccormick PA-C, [id number], Dept of Medicine Medicine PA Note     ELÍAS XIONG  MRN-[id number]  Allergies    No Known Allergies    Intolerances       Called to evaluate patient for "feeling uncomfortable". Patient seen and evaluated at bedside. Conversed with patient using Tajik  Tori #[id number]. Patient states that she woke up and was feeling uncomfortable. Upon further questioning, patient mainly uncomfortable with how she is positioned in bed and thinks she would feel better if she sat in a bedside chair. Patient also states that she felt short of breath however is on room air, not tachypneic and speaking in full sentences. However does appear to be mildly anxious. Pt told nursing staff her feet were numb however when asked about this patient no longer complains of it. Pt also moving tongue frequently, states that she has done this since she was a child whenever she is nervous. Otherwise denies chest pain, dizziness, N/V/D.    Vital Signs Last 24 Hrs  T(C): 36.6 (08-20-22 @ 23:31), Max: 36.8 (08-20-22 @ 05:03)  T(F): 97.8 (08-20-22 @ 23:31), Max: 98.3 (08-20-22 @ 05:03)  HR: 77 (08-20-22 @ 23:31) (65 - 79)  BP: 134/77 (08-20-22 @ 23:31) (82/60 - 138/76)  BP(mean): --  RR: 19 (08-20-22 @ 23:31) (18 - 19)  SpO2: 97% (08-20-22 @ 23:31) (95% - 98%)  Daily     Daily   I&O's Summary    19 Aug 2022 07:01  -  20 Aug 2022 07:00  --------------------------------------------------------  IN: 450 mL / OUT: 0 mL / NET: 450 mL    20 Aug 2022 07:01  -  21 Aug 2022 00:14  --------------------------------------------------------  IN: 780 mL / OUT: 0 mL / NET: 780 mL      CAPILLARY BLOOD GLUCOSE                          9.9    7.24  )-----------( 458      ( 19 Aug 2022 14:13 )             32.8     08-19    139  |  100  |  10  ----------------------------<  140<H>  3.9   |  30  |  0.60    Ca    10.0      19 Aug 2022 14:13      PT/INR - ( 19 Aug 2022 14:13 )   PT: 18.6 sec;   INR: 1.60 ratio         PTT - ( 19 Aug 2022 14:13 )  PTT:35.0 sec      PHYSICAL EXAM:  GENERAL: Anxious  EYES: EOMI, PERRLA,  CHEST/LUNG: Clear to auscultation bilaterally  HEART: Regular rate and rhythm  ABDOMEN: Soft, Nontender, Nondistended; Bowel sounds present  EXTREMITIES:  2+ Peripheral Pulses, No clubbing, cyanosis, or edema  PSYCH: AAOx3  NEUROLOGY: non-focal    Assessment/Plan: HPI:    87F Tajik speaking,  with PMH of HTN, HLD, arthritis presents to the ED for cardiac workup. Also found to have rib fractures and UTI.    #Anxiety, likely driving patient's feeling of being uncomfortable despite stable vital signs and benign physical exam.   -Patient to try sitting in bedside chair. Will eventually be moved to bed. Pt aware that she can not stay the whole night in the chair.  -Melatonin ordered.  -EKG performed with no changes compared to previous EKG. No recent events on tele monitoring.  -Will continue to closely follow and assess.    -Mj Mccormick PA-C, [id number], Dept of Medicine

## 2022-08-21 NOTE — PROGRESS NOTE ADULT - ASSESSMENT
87F (Azeri speaking, [place]  ID [id number]) with PMH of HTN, HLD, arthritis presents to the ED for cardiac workup. Also found to have rib fractures and UTI.

## 2022-08-21 NOTE — PROVIDER CONTACT NOTE (OTHER) - SITUATION
Patient called RN and stated she woke up feeling suddenly uncomfortable, pt complains her neck feels tight, she feels SOB, numbness in her feet, and she felt very cold and then very hot.

## 2022-08-21 NOTE — PROGRESS NOTE ADULT - ASSESSMENT
Agree with above assessment and plan as outlined above.    - ? reason for xarelto DELILAH irbyal Dr. Jeremy Cunningham [facility], [facility]  BEEPER [phone]

## 2022-08-21 NOTE — CONSULT NOTE ADULT - SUBJECTIVE AND OBJECTIVE BOX
EP ATTENDING        HISTORY OF PRESENT ILLNESS: She is a pleasant 86 y/o female PMH hypertension and hyperlipidemia a/w tachycardia (sinus). She is on xarelto for unclear reasons. Her echocardiogram is unremarkable. I am now called by Dr Forbes to r/o AF. There is no documented AF in [facility] nor the office chart, but data is recent and limited. She denies palpitations, syncope, nor angina. Tele has been unremarkable. Imaging has been negative for PE nor DVT.      PAST MEDICAL & SURGICAL HISTORY:  Hypertension  Hyperlipidemia  Suspected PAF    H/O knee surgery    MEDICATIONS  (STANDING):  chlorhexidine 2% Cloths 1 Application(s) Topical <User Schedule>  levothyroxine 100 MICROGram(s) Oral daily  lidocaine   4% Patch 1 Patch Transdermal daily  metoprolol tartrate 50 milliGRAM(s) Oral two times a day  pantoprazole    Tablet 40 milliGRAM(s) Oral before breakfast  rivaroxaban 20 milliGRAM(s) Oral with dinner  simvastatin 20 milliGRAM(s) Oral at bedtime    NKDA    FAMILY HISTORY:  FH: heart disease (Father)      Non-contributary for premature coronary disease or sudden cardiac death    SOCIAL HISTORY:    [x ] Non-smoker  [ ] Smoker  [ ] Alcohol      REVIEW OF SYSTEMS:  [ ]chest pain  [  ]shortness of breath  [  ]palpitations  [  ]syncope  [ ]near syncope [ ]upper extremity weakness   [ ] lower extremity weakness  [  ]diplopia  [  ]altered mental status   [  ]fevers  [ ]chills [ ]nausea  [ ]vomitting  [  ]dysphagia    [ ]abdominal pain  [ ]melena  [ ]BRBPR    [  ]epistaxis  [  ]rash    [ ]lower extremity edema        [ x] All others negative	  [ ] Unable to obtain    PHYSICAL EXAM:  T(C): 36.7 (08-21-22 @ 10:22), Max: 36.7 (08-21-22 @ 10:22)  HR: 83 (08-21-22 @ 10:22) (65 - 83)  BP: 113/65 (08-21-22 @ 10:22) (82/60 - 134/77)  RR: 18 (08-21-22 @ 10:22) (18 - 19)  SpO2: 97% (08-21-22 @ 10:22) (97% - 98%)  Wt(kg): --    Appearance: Normal	  HEENT:   Normal oral mucosa, PERRL, EOMI	  Lymphatic: No lymphadenopathy , no edema  Cardiovascular: Normal S1 S2, No JVD, No murmurs , Peripheral pulses palpable 2+ bilaterally  Respiratory: Lungs clear to auscultation, normal effort 	  Gastrointestinal:  Soft, Non-tender, + BS	  Skin: No rashes, No ecchymoses, No cyanosis, warm to touch  Musculoskeletal: Normal range of motion, normal strength  Psychiatry:  Mood & affect appropriate      TELEMETRY: 	    ECG:  	    Echo:  NST:  Cath:  	  	  LABS:	 	                          10.0   6.37  )-----------( 370      ( 21 Aug 2022 07:04 )             33.2     08-21    139  |  101  |  13  ----------------------------<  86  4.0   |  28  |  0.48<L>    Ca    9.8      21 Aug 2022 07:00      proBNP:   Lipid Profile:   HgA1c:   TSH:     A/P) She is a pleasant 86 y/o female PMH hypertension and hyperlipidemia a/w tachycardia (sinus). She is on xarelto for unclear reasons. Her echocardiogram is unremarkable. I am now called by Dr Forbes to r/o AF. There is no documented AF in [facility] nor the office chart, but data is recent and limited. She denies palpitations, syncope, nor angina. Tele has been unremarkable. Imaging has been negative for PE nor DVT.    -I suspect she was diagnosed with PAF in the past, and that's why she's on xarelto and metoprolol  -would continue these meds for now  -recommend outpatient mobile cardiac telemetry, followed by possible outpatient elective ILR if MCOT is non-diagnostic  -no further inpatient EP workup expected  -f/u with Leidy on 8/30 at 115pm as scheduled       Otoniel Luna M.D., [facility]  Cardiac Electrophysiology  [facility]  [address] [place] [address], [address] [place] [address]  [email or web address]    office [phone]  pager [phone]

## 2022-08-21 NOTE — PROGRESS NOTE ADULT - SUBJECTIVE AND OBJECTIVE BOX
Patient is a 87y old  Female who presents with a chief complaint of cardiac workup (21 Aug 2022 10:45)      DATE OF SERVICE: 08-21-22 @ 12:58    SUBJECTIVE / OVERNIGHT EVENTS: overnight events noted    ROS:  Resp: No cough no sputum production  CVS: No chest pain no palpitations no orthopnea  GI: no N/V/D  : no dysuria, no hematuria      MEDICATIONS  (STANDING):  chlorhexidine 2% Cloths 1 Application(s) Topical <User Schedule>  levothyroxine 100 MICROGram(s) Oral daily  lidocaine   4% Patch 1 Patch Transdermal daily  metoprolol tartrate 50 milliGRAM(s) Oral two times a day  pantoprazole    Tablet 40 milliGRAM(s) Oral before breakfast  rivaroxaban 20 milliGRAM(s) Oral with dinner  simvastatin 20 milliGRAM(s) Oral at bedtime    MEDICATIONS  (PRN):  acetaminophen     Tablet .. 650 milliGRAM(s) Oral every 6 hours PRN Temp greater or equal to 38C (100.4F), Mild Pain (1 - 3), Moderate Pain (4 - 6)        CAPILLARY BLOOD GLUCOSE        I&O's Summary    20 Aug 2022 07:01  -  21 Aug 2022 07:00  --------------------------------------------------------  IN: 780 mL / OUT: 0 mL / NET: 780 mL    21 Aug 2022 07:01  -  21 Aug 2022 12:58  --------------------------------------------------------  IN: 480 mL / OUT: 0 mL / NET: 480 mL        Vital Signs Last 24 Hrs  T(C): 37.3 (21 Aug 2022 11:36), Max: 37.3 (21 Aug 2022 11:36)  T(F): 99.2 (21 Aug 2022 11:36), Max: 99.2 (21 Aug 2022 11:36)  HR: 83 (21 Aug 2022 10:22) (74 - 83)  BP: 113/65 (21 Aug 2022 10:22) (82/60 - 134/77)  BP(mean): --  RR: 18 (21 Aug 2022 10:22) (18 - 19)  SpO2: 97% (21 Aug 2022 10:22) (97% - 98%)    PHYSICAL EXAM:  GENERAL: NAD,  EYES: EOMI, PERRLA,   NECK: Supple, No JVD  CHEST/LUNG: Clear  HEART: S1S2; systolic murmur +   ABDOMEN: Soft, Nontender  EXTREMITIES:  trace edema  NEUROLOGY: Alert, nonfocal  SKIN: No rashes or lesions    LABS:                        10.0   6.37  )-----------( 370      ( 21 Aug 2022 07:04 )             33.2     08-21    139  |  101  |  13  ----------------------------<  86  4.0   |  28  |  0.48<L>    Ca    9.8      21 Aug 2022 07:00      PT/INR - ( 19 Aug 2022 14:13 )   PT: 18.6 sec;   INR: 1.60 ratio         PTT - ( 19 Aug 2022 14:13 )  PTT:35.0 sec            All consultant(s) notes reviewed and care discussed with other providers        Contact Number, Dr Hoyt [phone]

## 2022-08-21 NOTE — PROVIDER CONTACT NOTE (OTHER) - ACTION/TREATMENT ORDERED:
Provider notified. EKG obtained. Provider evaluated pt at bedside with  phone. Numbness resolved. Patient assisted to chair. Melatonin 1mg ordered.

## 2022-08-21 NOTE — CONSULT NOTE ADULT - CONSULT REASON
tachycardia, on xarelto for unclear reasons, r/o PAF
Tachycardia/Abnormal Outpatient Echo
Trauma eval
medical management

## 2022-08-22 LAB
ANION GAP SERPL CALC-SCNC: 9 MMOL/L — SIGNIFICANT CHANGE UP (ref 5–17)
APPEARANCE UR: CLEAR — SIGNIFICANT CHANGE UP
BACTERIA # UR AUTO: NEGATIVE — SIGNIFICANT CHANGE UP
BILIRUB UR-MCNC: NEGATIVE — SIGNIFICANT CHANGE UP
BUN SERPL-MCNC: 12 MG/DL — SIGNIFICANT CHANGE UP (ref 7–23)
CALCIUM SERPL-MCNC: 10 MG/DL — SIGNIFICANT CHANGE UP (ref 8.4–10.5)
CHLORIDE SERPL-SCNC: 101 MMOL/L — SIGNIFICANT CHANGE UP (ref 96–108)
CO2 SERPL-SCNC: 28 MMOL/L — SIGNIFICANT CHANGE UP (ref 22–31)
COLOR SPEC: COLORLESS — SIGNIFICANT CHANGE UP
CREAT SERPL-MCNC: 0.57 MG/DL — SIGNIFICANT CHANGE UP (ref 0.5–1.3)
DIFF PNL FLD: ABNORMAL
EGFR: 88 ML/MIN/1.73M2 — SIGNIFICANT CHANGE UP
EPI CELLS # UR: 0 /HPF — SIGNIFICANT CHANGE UP
GLUCOSE SERPL-MCNC: 81 MG/DL — SIGNIFICANT CHANGE UP (ref 70–99)
GLUCOSE UR QL: NEGATIVE — SIGNIFICANT CHANGE UP
HCT VFR BLD CALC: 33.2 % — LOW (ref 34.5–45)
HGB BLD-MCNC: 10.1 G/DL — LOW (ref 11.5–15.5)
HYALINE CASTS # UR AUTO: 0 /LPF — SIGNIFICANT CHANGE UP (ref 0–2)
KETONES UR-MCNC: NEGATIVE — SIGNIFICANT CHANGE UP
LEUKOCYTE ESTERASE UR-ACNC: NEGATIVE — SIGNIFICANT CHANGE UP
MCHC RBC-ENTMCNC: 26.9 PG — LOW (ref 27–34)
MCHC RBC-ENTMCNC: 30.4 GM/DL — LOW (ref 32–36)
MCV RBC AUTO: 88.3 FL — SIGNIFICANT CHANGE UP (ref 80–100)
NITRITE UR-MCNC: NEGATIVE — SIGNIFICANT CHANGE UP
NRBC # BLD: 0 /100 WBCS — SIGNIFICANT CHANGE UP (ref 0–0)
PH UR: 7.5 — SIGNIFICANT CHANGE UP (ref 5–8)
PLATELET # BLD AUTO: 429 K/UL — HIGH (ref 150–400)
POTASSIUM SERPL-MCNC: 4.2 MMOL/L — SIGNIFICANT CHANGE UP (ref 3.5–5.3)
POTASSIUM SERPL-SCNC: 4.2 MMOL/L — SIGNIFICANT CHANGE UP (ref 3.5–5.3)
PROT UR-MCNC: NEGATIVE — SIGNIFICANT CHANGE UP
RBC # BLD: 3.76 M/UL — LOW (ref 3.8–5.2)
RBC # FLD: 15.3 % — HIGH (ref 10.3–14.5)
RBC CASTS # UR COMP ASSIST: 7 /HPF — HIGH (ref 0–4)
SODIUM SERPL-SCNC: 138 MMOL/L — SIGNIFICANT CHANGE UP (ref 135–145)
SP GR SPEC: 1.01 — LOW (ref 1.01–1.02)
UROBILINOGEN FLD QL: NEGATIVE — SIGNIFICANT CHANGE UP
WBC # BLD: 6 K/UL — SIGNIFICANT CHANGE UP (ref 3.8–10.5)
WBC # FLD AUTO: 6 K/UL — SIGNIFICANT CHANGE UP (ref 3.8–10.5)
WBC UR QL: 0 /HPF — SIGNIFICANT CHANGE UP (ref 0–5)

## 2022-08-22 RX ADMIN — SIMVASTATIN 20 MILLIGRAM(S): 20 TABLET, FILM COATED ORAL at 21:20

## 2022-08-22 RX ADMIN — Medication 50 MILLIGRAM(S): at 17:19

## 2022-08-22 RX ADMIN — Medication 50 MILLIGRAM(S): at 05:55

## 2022-08-22 RX ADMIN — PANTOPRAZOLE SODIUM 40 MILLIGRAM(S): 20 TABLET, DELAYED RELEASE ORAL at 05:56

## 2022-08-22 RX ADMIN — LIDOCAINE 1 PATCH: 4 CREAM TOPICAL at 22:16

## 2022-08-22 RX ADMIN — CHLORHEXIDINE GLUCONATE 1 APPLICATION(S): 213 SOLUTION TOPICAL at 05:55

## 2022-08-22 RX ADMIN — RIVAROXABAN 20 MILLIGRAM(S): KIT at 17:20

## 2022-08-22 RX ADMIN — LIDOCAINE 1 PATCH: 4 CREAM TOPICAL at 11:20

## 2022-08-22 RX ADMIN — LIDOCAINE 1 PATCH: 4 CREAM TOPICAL at 19:49

## 2022-08-22 RX ADMIN — Medication 100 MICROGRAM(S): at 05:55

## 2022-08-22 NOTE — PROGRESS NOTE ADULT - ASSESSMENT
87F (Armenian speaking, [place]  ID [id number]) with PMH of HTN, HLD, arthritis presents to the ED for cardiac workup. Also found to have rib fractures and UTI.

## 2022-08-22 NOTE — PROGRESS NOTE ADULT - SUBJECTIVE AND OBJECTIVE BOX
C A R D I O L O G Y  **********************************     DATE OF SERVICE: 08-22-22    Denies chest pain or SOB. Review of systems otherwise negative      MEDICATIONS  (STANDING):  chlorhexidine 2% Cloths 1 Application(s) Topical <User Schedule>  levothyroxine 100 MICROGram(s) Oral daily  lidocaine   4% Patch 1 Patch Transdermal daily  metoprolol tartrate 50 milliGRAM(s) Oral two times a day  pantoprazole    Tablet 40 milliGRAM(s) Oral before breakfast  rivaroxaban 20 milliGRAM(s) Oral with dinner  simvastatin 20 milliGRAM(s) Oral at bedtime    MEDICATIONS  (PRN):  acetaminophen     Tablet .. 650 milliGRAM(s) Oral every 6 hours PRN Temp greater or equal to 38C (100.4F), Mild Pain (1 - 3), Moderate Pain (4 - 6)      LABS:                          10.1   6.00  )-----------( 429      ( 22 Aug 2022 07:15 )             33.2     Hemoglobin: 10.1 g/dL (08-22 @ 07:15)  Hemoglobin: 10.0 g/dL (08-21 @ 07:04)  Hemoglobin: 9.9 g/dL (08-19 @ 14:13)  Hemoglobin: 10.2 g/dL (08-17 @ 20:11)    08-22    138  |  101  |  12  ----------------------------<  81  4.2   |  28  |  0.57    Ca    10.0      22 Aug 2022 07:18      Creatinine Trend: 0.57<--, 0.48<--, 0.60<--, 0.54<--             08-21-22 @ 07:01  -  08-22-22 @ 07:00  --------------------------------------------------------  IN: 930 mL / OUT: 1000 mL / NET: -70 mL    08-22-22 @ 07:01  -  08-22-22 @ 12:38  --------------------------------------------------------  IN: 180 mL / OUT: 0 mL / NET: 180 mL        PHYSICAL EXAM  Vital Signs Last 24 Hrs  T(C): 36.8 (22 Aug 2022 11:22), Max: 37 (22 Aug 2022 04:57)  T(F): 98.3 (22 Aug 2022 11:22), Max: 98.6 (22 Aug 2022 04:57)  HR: 107 (22 Aug 2022 11:22) (81 - 107)  BP: 112/71 (22 Aug 2022 11:22) (112/71 - 125/76)  BP(mean): --  RR: 18 (22 Aug 2022 11:22) (18 - 18)  SpO2: 98% (22 Aug 2022 11:22) (95% - 98%)    Parameters below as of 22 Aug 2022 11:22  Patient On (Oxygen Delivery Method): room air      Gen: Appears well in NAD  HEENT:  (-)icterus (-)pallor  CV: N S1 S2 1/6 SUNDEEP (+)2 Pulses B/l  Resp:  Clear to auscultation B/L, normal effort  GI: (+) BS Soft, NT, ND  Lymph:  (-)Edema, (-)obvious lymphadenopathy  Skin: Warm to touch, Normal turgor  Psych: Appropriate mood and affect      TELEMETRY: SR/ST    ECG: Sinus Tachycardia, 1st degree AVB    < from: Transthoracic Echocardiogram (08.18.22 @ 14:01) >  Conclusions:  1. Severely dilated left atrium.  LA volume index = 55  cc/m2.  2. Normal left ventricular systolic function. No segmental  wall motion abnormalities.  3. Normal right ventricular size and function.  4. Normal tricuspid valve. Moderate tricuspid  regurgitation.  5. Estimated pulmonary artery systolic pressure equals 49  mm Hg, assuming right atrial pressure equals 8 mm Hg,  consistent with mild pulmonary pressures.  *** No previous Echo exam.    < end of copied text >    RADIOLOGY:  < from: CT Angio Chest PE Protocol w/ IV Cont (08.17.22 @ 20:56) >  IMPRESSION:    Limited evaluation of some segmental and subsegmental pulmonary arteries.   There is no pulmonary embolism to the level of the lobar arteries, or   within the visualized segmental and subsegmental branches.    Acute nondisplaced left 10th and 11th rib fractures. Small left pleural   effusion.    --- End of Report ---    < end of copied text >      ASSESSMENT/PLAN: Patient is an 86 y/o Finnish-speaking Female with PMH of HTN, HLD, arthritis who was sent in by cardiologist due to tachycardia and an abnormal outpatient echo with LV dysfunction found to have a UTI and Acute nondisplaced left 10th and 11th rib fractures. Cardiology consulted for further evaluation.     - Not in clinical HF  - s/p abx per med - f/u repeat BCx and CXR  - CTA neg for PE, +L rib fx   - LE dopplers neg for DVT  - Repeat echo with normal LV/RV function - prior outpatient echo limited by tachycardia  - EP consult appreciated - suspect hx of PAF on Xarelto/Metoprolol - plan for outpatient monitor to look for PAF  - No further inpatient cardiac w/u planned  - Patient to f/u with Dr. Forbes on 8/30 at 1:15 PM    Prem Salazar PA-C  Pager: [phone]

## 2022-08-22 NOTE — PROGRESS NOTE ADULT - SUBJECTIVE AND OBJECTIVE BOX
EP ATTENDING    HISTORY OF PRESENT ILLNESS: She is a pleasant 88 y/o female PMH hypertension and hyperlipidemia a/w tachycardia (sinus). She is on xarelto for unclear reasons. Her echocardiogram is unremarkable. I am now called by Dr Forbes to r/o AF. There is no documented AF in [place] nor the office chart, but data is recent and limited. She denies palpitations, syncope, nor angina. Tele has been unremarkable. Imaging has been negative for PE nor DVT.  Date of service 8/22- resting comfortably. no new complaints and no events overnight.      acetaminophen     Tablet .. 650 milliGRAM(s) Oral every 6 hours PRN  chlorhexidine 2% Cloths 1 Application(s) Topical <User Schedule>  levothyroxine 100 MICROGram(s) Oral daily  lidocaine   4% Patch 1 Patch Transdermal daily  metoprolol tartrate 50 milliGRAM(s) Oral two times a day  pantoprazole    Tablet 40 milliGRAM(s) Oral before breakfast  rivaroxaban 20 milliGRAM(s) Oral with dinner  simvastatin 20 milliGRAM(s) Oral at bedtime                            10.1   6.00  )-----------( 429      ( 22 Aug 2022 07:15 )             33.2       08-22    138  |  101  |  12  ----------------------------<  81  4.2   |  28  |  0.57    Ca    10.0      22 Aug 2022 07:18    T(C): 36.8 (08-22-22 @ 11:22), Max: 37 (08-22-22 @ 04:57)  HR: 107 (08-22-22 @ 11:22) (81 - 107)  BP: 112/71 (08-22-22 @ 11:22) (112/71 - 125/76)  RR: 18 (08-22-22 @ 11:22) (18 - 18)  SpO2: 98% (08-22-22 @ 11:22) (95% - 98%)  Wt(kg): --    I&O's Summary    21 Aug 2022 07:01  -  22 Aug 2022 07:00  --------------------------------------------------------  IN: 930 mL / OUT: 1000 mL / NET: -70 mL    22 Aug 2022 07:01  -  22 Aug 2022 12:13  --------------------------------------------------------  IN: 180 mL / OUT: 0 mL / NET: 180 mL      Appearance: Normal	  HEENT:   Normal oral mucosa, PERRL, EOMI	  Lymphatic: No lymphadenopathy , no edema  Cardiovascular: Normal S1 S2, No JVD, No murmurs , Peripheral pulses palpable 2+ bilaterally  Respiratory: Lungs clear to auscultation, normal effort 	  Gastrointestinal:  Soft, Non-tender, + BS	  Skin: No rashes, No ecchymoses, No cyanosis, warm to touch  Musculoskeletal: Normal range of motion, normal strength  Psychiatry:  Mood & affect appropriate      TELEMETRY: NSR	    ECG: NSR  Echo:  < from: Transthoracic Echocardiogram (08.18.22 @ 14:01) >  Dimensions:    Normal Values:  LA:     4.0    2.0 - 4.0 cm  Ao:     2.9    2.0 - 3.8 cm  SEPTUM: 1.1    0.6 - 1.2 cm  PWT:    1.0    0.6 - 1.1 cm  LVIDd:  4.2    3.0 - 5.6 cm  LVIDs:  3.1    1.8 - 4.0 cm  Derived variables:  LVMI: 85 g/m2  RWT: 0.47  Fractional short: 26 %  EF (Erickson Rule): 65 %Doppler Peak Velocity (m/sec):  AoV=1.7  ------------------------------------------------------------------------  Observations:  Mitral Valve: Thickened mitral valve. Mild-moderate mitral  regurgitation.  Aortic Valve/Aorta: Thickened trileaflet aortic valve. Peak  transaortic valve gradient equals 12 mm Hg. Mild aortic  regurgitation.  Peak left ventricular outflow tract  gradient equals 5 mm Hg, mean gradient is equal to 2 mm Hg,  LVOT velocity time integral equals 22 cm.  Aortic Root: 2.9 cm.  Left Atrium: Severely dilated left atrium.  LA volume index  = 55 cc/m2.  Left Ventricle: Normal left ventricular systolic function.  No segmental wall motion abnormalities. Normal left  ventricular internal dimensions and wall thicknesses.  Moderate diastolic dysfunction (Stage II).  Right Heart: Normal right atrium. Normal right ventricular  size and function. Normal tricuspid valve. Moderate  tricuspid regurgitation. Normal pulmonic valve. Minimal  pulmonic regurgitation.  Pericardium/Pleura: Normal pericardium with no pericardial  effusion.  Hemodynamic: Estimated right atrial pressure is 8 mm Hg.  Estimated right ventricular systolic pressure equals 49 mm  Hg, assuming right atrial pressure equals 8 mm Hg,  consistent with mild pulmonary hypertension.    < end of copied text >  	      A/P) She is a pleasant 88 y/o female PMH hypertension and hyperlipidemia a/w tachycardia (sinus). She is on xarelto for unclear reasons. Her echocardiogram is unremarkable. I am now called by Dr Forbes to r/o AF. There is no documented AF in [place] nor the office chart, but data is recent and limited. She denies palpitations, syncope, nor angina. Tele has been unremarkable. Imaging has been negative for PE nor DVT.    -I suspect she was diagnosed with PAF in the past, and that's why she's on xarelto and metoprolol  -would continue these meds for now  -recommend outpatient mobile cardiac telemetry, followed by possible outpatient elective ILR if MCOT is non-diagnostic  -no further inpatient EP workup expected  -f/u with [facility] on 8/30 at 115pm as scheduled    Nikolai Castellano M.D.  Cardiac Electrophysiology  [phone]

## 2022-08-22 NOTE — PROGRESS NOTE ADULT - SUBJECTIVE AND OBJECTIVE BOX
Patient is a 87y old  Female who presents with a chief complaint of cardiac workup (21 Aug 2022 12:58)      DATE OF SERVICE: 22 @ 12:12    SUBJECTIVE / OVERNIGHT EVENTS: overnight events noted  "I feel fine'     ROS:  Resp: No cough no sputum production  CVS: No chest pain no palpitations no orthopnea  GI: no N/V/D  : no dysuria, no hematuria  Neuro: no weakness no paresthesias          MEDICATIONS  (STANDING):  chlorhexidine 2% Cloths 1 Application(s) Topical <User Schedule>  levothyroxine 100 MICROGram(s) Oral daily  lidocaine   4% Patch 1 Patch Transdermal daily  metoprolol tartrate 50 milliGRAM(s) Oral two times a day  pantoprazole    Tablet 40 milliGRAM(s) Oral before breakfast  rivaroxaban 20 milliGRAM(s) Oral with dinner  simvastatin 20 milliGRAM(s) Oral at bedtime    MEDICATIONS  (PRN):  acetaminophen     Tablet .. 650 milliGRAM(s) Oral every 6 hours PRN Temp greater or equal to 38C (100.4F), Mild Pain (1 - 3), Moderate Pain (4 - 6)        CAPILLARY BLOOD GLUCOSE        I&O's Summary    21 Aug 2022 07:01  -  22 Aug 2022 07:00  --------------------------------------------------------  IN: 930 mL / OUT: 1000 mL / NET: -70 mL    22 Aug 2022 07:01  -  22 Aug 2022 12:12  --------------------------------------------------------  IN: 180 mL / OUT: 0 mL / NET: 180 mL        Vital Signs Last 24 Hrs  T(C): 36.8 (22 Aug 2022 11:22), Max: 37 (22 Aug 2022 04:57)  T(F): 98.3 (22 Aug 2022 11:22), Max: 98.6 (22 Aug 2022 04:57)  HR: 107 (22 Aug 2022 11:22) (81 - 107)  BP: 112/71 (22 Aug 2022 11:22) (112/71 - 125/76)  BP(mean): --  RR: 18 (22 Aug 2022 11:22) (18 - 18)  SpO2: 98% (22 Aug 2022 11:22) (95% - 98%)      PHYSICAL EXAM:  GENERAL: NAD,  EYES: EOMI, PERRLA,   NECK: Supple, No JVD  CHEST/LUNG: Clear  HEART: S1S2; systolic murmur +   ABDOMEN: Soft, Nontender  EXTREMITIES:  trace edema  NEUROLOGY: Alert, nonfocal  SKIN: No rashes or lesions    LABS:                        10.1   6.00  )-----------( 429      ( 22 Aug 2022 07:15 )             33.2     08-    138  |  101  |  12  ----------------------------<  81  4.2   |  28  |  0.57    Ca    10.0      22 Aug 2022 07:18            Urinalysis Basic - ( 22 Aug 2022 07:16 )    Color: Colorless / Appearance: Clear / S.008 / pH: x  Gluc: x / Ketone: Negative  / Bili: Negative / Urobili: Negative   Blood: x / Protein: Negative / Nitrite: Negative   Leuk Esterase: Negative / RBC: 7 /hpf / WBC 0 /HPF   Sq Epi: x / Non Sq Epi: 0 /hpf / Bacteria: Negative          All consultant(s) notes reviewed and care discussed with other providers        Contact Number, Dr Hoyt [phone]

## 2022-08-23 ENCOUNTER — TRANSCRIPTION ENCOUNTER (OUTPATIENT)
Age: 87
End: 2022-08-23

## 2022-08-23 VITALS
RESPIRATION RATE: 18 BRPM | HEART RATE: 95 BPM | DIASTOLIC BLOOD PRESSURE: 76 MMHG | SYSTOLIC BLOOD PRESSURE: 116 MMHG | TEMPERATURE: 97 F | OXYGEN SATURATION: 97 %

## 2022-08-23 PROCEDURE — 99285 EMERGENCY DEPT VISIT HI MDM: CPT

## 2022-08-23 PROCEDURE — 71045 X-RAY EXAM CHEST 1 VIEW: CPT

## 2022-08-23 PROCEDURE — 87077 CULTURE AEROBIC IDENTIFY: CPT

## 2022-08-23 PROCEDURE — 97110 THERAPEUTIC EXERCISES: CPT

## 2022-08-23 PROCEDURE — 83735 ASSAY OF MAGNESIUM: CPT

## 2022-08-23 PROCEDURE — 97530 THERAPEUTIC ACTIVITIES: CPT

## 2022-08-23 PROCEDURE — 87640 STAPH A DNA AMP PROBE: CPT

## 2022-08-23 PROCEDURE — 81001 URINALYSIS AUTO W/SCOPE: CPT

## 2022-08-23 PROCEDURE — 85027 COMPLETE CBC AUTOMATED: CPT

## 2022-08-23 PROCEDURE — 83550 IRON BINDING TEST: CPT

## 2022-08-23 PROCEDURE — 82728 ASSAY OF FERRITIN: CPT

## 2022-08-23 PROCEDURE — 93306 TTE W/DOPPLER COMPLETE: CPT

## 2022-08-23 PROCEDURE — 85610 PROTHROMBIN TIME: CPT

## 2022-08-23 PROCEDURE — 80048 BASIC METABOLIC PNL TOTAL CA: CPT

## 2022-08-23 PROCEDURE — 0225U NFCT DS DNA&RNA 21 SARSCOV2: CPT

## 2022-08-23 PROCEDURE — 87086 URINE CULTURE/COLONY COUNT: CPT

## 2022-08-23 PROCEDURE — 87186 SC STD MICRODIL/AGAR DIL: CPT

## 2022-08-23 PROCEDURE — 83540 ASSAY OF IRON: CPT

## 2022-08-23 PROCEDURE — 93005 ELECTROCARDIOGRAM TRACING: CPT

## 2022-08-23 PROCEDURE — U0005: CPT

## 2022-08-23 PROCEDURE — 36415 COLL VENOUS BLD VENIPUNCTURE: CPT

## 2022-08-23 PROCEDURE — 96374 THER/PROPH/DIAG INJ IV PUSH: CPT | Mod: XU

## 2022-08-23 PROCEDURE — 83880 ASSAY OF NATRIURETIC PEPTIDE: CPT

## 2022-08-23 PROCEDURE — 87040 BLOOD CULTURE FOR BACTERIA: CPT

## 2022-08-23 PROCEDURE — 84484 ASSAY OF TROPONIN QUANT: CPT

## 2022-08-23 PROCEDURE — 93970 EXTREMITY STUDY: CPT

## 2022-08-23 PROCEDURE — 84443 ASSAY THYROID STIM HORMONE: CPT

## 2022-08-23 PROCEDURE — 97162 PT EVAL MOD COMPLEX 30 MIN: CPT

## 2022-08-23 PROCEDURE — 71275 CT ANGIOGRAPHY CHEST: CPT | Mod: MA

## 2022-08-23 PROCEDURE — 82607 VITAMIN B-12: CPT

## 2022-08-23 PROCEDURE — U0003: CPT

## 2022-08-23 PROCEDURE — 87641 MR-STAPH DNA AMP PROBE: CPT

## 2022-08-23 PROCEDURE — 80053 COMPREHEN METABOLIC PANEL: CPT

## 2022-08-23 PROCEDURE — 82746 ASSAY OF FOLIC ACID SERUM: CPT

## 2022-08-23 PROCEDURE — 85025 COMPLETE CBC W/AUTO DIFF WBC: CPT

## 2022-08-23 PROCEDURE — 85730 THROMBOPLASTIN TIME PARTIAL: CPT

## 2022-08-23 PROCEDURE — 97116 GAIT TRAINING THERAPY: CPT

## 2022-08-23 RX ADMIN — Medication 50 MILLIGRAM(S): at 06:06

## 2022-08-23 RX ADMIN — LIDOCAINE 1 PATCH: 4 CREAM TOPICAL at 11:07

## 2022-08-23 RX ADMIN — Medication 50 MILLIGRAM(S): at 17:23

## 2022-08-23 RX ADMIN — RIVAROXABAN 20 MILLIGRAM(S): KIT at 17:23

## 2022-08-23 RX ADMIN — Medication 100 MICROGRAM(S): at 06:06

## 2022-08-23 RX ADMIN — PANTOPRAZOLE SODIUM 40 MILLIGRAM(S): 20 TABLET, DELAYED RELEASE ORAL at 06:06

## 2022-08-23 RX ADMIN — CHLORHEXIDINE GLUCONATE 1 APPLICATION(S): 213 SOLUTION TOPICAL at 06:03

## 2022-08-23 NOTE — PROGRESS NOTE ADULT - PROVIDER SPECIALTY LIST ADULT
Cardiology
Electrophysiology
Cardiology
Cardiology
Electrophysiology
Internal Medicine

## 2022-08-23 NOTE — PROGRESS NOTE ADULT - PROBLEM SELECTOR PLAN 2
heart rate controlled   now in NSR  will continue to monitor   continue rivaroxaban

## 2022-08-23 NOTE — PROGRESS NOTE ADULT - ASSESSMENT
87F ([place] speaking, [place]  ID [id number]) with PMH of HTN, HLD, arthritis presents to the ED for cardiac workup. Also found to have rib fractures and UTI.

## 2022-08-23 NOTE — PROGRESS NOTE ADULT - SUBJECTIVE AND OBJECTIVE BOX
EP ATTENDING    HISTORY OF PRESENT ILLNESS: She is a pleasant 88 y/o female PMH hypertension and hyperlipidemia a/w tachycardia (sinus). She is on xarelto for unclear reasons. Her echocardiogram is unremarkable. I am now called by Dr Forbes to r/o AF. There is no documented AF in [place] nor the office chart, but data is recent and limited. She denies palpitations, syncope, nor angina. Tele has been unremarkable. Imaging has been negative for PE nor DVT.  8/22- resting comfortably. no new complaints and no events overnight.  Date of service 8/23- no new complaints and no overnight events.      acetaminophen     Tablet .. 650 milliGRAM(s) Oral every 6 hours PRN  chlorhexidine 2% Cloths 1 Application(s) Topical <User Schedule>  levothyroxine 100 MICROGram(s) Oral daily  lidocaine   4% Patch 1 Patch Transdermal daily  metoprolol tartrate 50 milliGRAM(s) Oral two times a day  pantoprazole    Tablet 40 milliGRAM(s) Oral before breakfast  rivaroxaban 20 milliGRAM(s) Oral with dinner  simvastatin 20 milliGRAM(s) Oral at bedtime                        10.1   6.00  )-----------( 429      ( 22 Aug 2022 07:15 )             33.2       08-22    138  |  101  |  12  ----------------------------<  81  4.2   |  28  |  0.57    Ca    10.0      22 Aug 2022 07:18      T(C): 36.7 (08-23-22 @ 04:35), Max: 36.8 (08-22-22 @ 11:22)  HR: 73 (08-23-22 @ 04:35) (73 - 118)  BP: 123/74 (08-23-22 @ 04:35) (100/67 - 125/79)  RR: 18 (08-23-22 @ 04:35) (18 - 19)  SpO2: 95% (08-23-22 @ 04:35) (95% - 99%)  Wt(kg): --    I&O's Summary    22 Aug 2022 07:01  -  23 Aug 2022 07:00  --------------------------------------------------------  IN: 940 mL / OUT: 1230 mL / NET: -290 mL      Appearance: Normal	  HEENT:   Normal oral mucosa, PERRL, EOMI	  Lymphatic: No lymphadenopathy , no edema  Cardiovascular: Normal S1 S2, No JVD, No murmurs , Peripheral pulses palpable 2+ bilaterally  Respiratory: Lungs clear to auscultation, normal effort 	  Gastrointestinal:  Soft, Non-tender, + BS	  Skin: No rashes, No ecchymoses, No cyanosis, warm to touch  Musculoskeletal: Normal range of motion, normal strength  Psychiatry:  Mood & affect appropriate    TELEMETRY: NSR	    ECG: NSR  Echo:  < from: Transthoracic Echocardiogram (08.18.22 @ 14:01) >  Dimensions:    Normal Values:  LA:     4.0    2.0 - 4.0 cm  Ao:     2.9    2.0 - 3.8 cm  SEPTUM: 1.1    0.6 - 1.2 cm  PWT:    1.0    0.6 - 1.1 cm  LVIDd:  4.2    3.0 - 5.6 cm  LVIDs:  3.1    1.8 - 4.0 cm  Derived variables:  LVMI: 85 g/m2  RWT: 0.47  Fractional short: 26 %  EF (Erickson Rule): 65 %Doppler Peak Velocity (m/sec):  AoV=1.7  ------------------------------------------------------------------------  Observations:  Mitral Valve: Thickened mitral valve. Mild-moderate mitral  regurgitation.  Aortic Valve/Aorta: Thickened trileaflet aortic valve. Peak  transaortic valve gradient equals 12 mm Hg. Mild aortic  regurgitation.  Peak left ventricular outflow tract  gradient equals 5 mm Hg, mean gradient is equal to 2 mm Hg,  LVOT velocity time integral equals 22 cm.  Aortic Root: 2.9 cm.  Left Atrium: Severely dilated left atrium.  LA volume index  = 55 cc/m2.  Left Ventricle: Normal left ventricular systolic function.  No segmental wall motion abnormalities. Normal left  ventricular internal dimensions and wall thicknesses.  Moderate diastolic dysfunction (Stage II).  Right Heart: Normal right atrium. Normal right ventricular  size and function. Normal tricuspid valve. Moderate  tricuspid regurgitation. Normal pulmonic valve. Minimal  pulmonic regurgitation.  Pericardium/Pleura: Normal pericardium with no pericardial  effusion.  Hemodynamic: Estimated right atrial pressure is 8 mm Hg.  Estimated right ventricular systolic pressure equals 49 mm  Hg, assuming right atrial pressure equals 8 mm Hg,  consistent with mild pulmonary hypertension.    < end of copied text >  	    A/P) She is a pleasant 88 y/o female PMH hypertension and hyperlipidemia a/w tachycardia (sinus). She is on xarelto for unclear reasons. Her echocardiogram is unremarkable. I am now called by Dr Forbes to r/o AF. There is no documented AF in [place] nor the office chart, but data is recent and limited. She denies palpitations, syncope, nor angina. Tele has been unremarkable. Imaging has been negative for PE nor DVT.    -I suspect she was diagnosed with PAF in the past, and that's why she's on xarelto and metoprolol  -would continue these meds for now  -recommend outpatient mobile cardiac telemetry, followed by possible outpatient elective ILR if MCOT is non-diagnostic  -no further inpatient EP workup expected  -f/u with [facility] on 8/30 at 115pm as scheduled    Nikolai Castellano M.D.  Cardiac Electrophysiology  [phone]

## 2022-08-23 NOTE — PROGRESS NOTE ADULT - NS ATTEND AMEND GEN_ALL_CORE FT
Patient seen and examined. Agree with above.    1. C/w Xarelto and BB for likely paroxysmal AFIB, outpatient monitor to assess burden per EP.

## 2022-08-23 NOTE — DISCHARGE NOTE NURSING/CASE MANAGEMENT/SOCIAL WORK - NSDCPEFALRISK_GEN_ALL_CORE
For information on Fall & Injury Prevention, visit: [email or web address] OR  [email or web address] OR  https://www.cdc.gov/steadi/patient.html

## 2022-08-23 NOTE — PROGRESS NOTE ADULT - PROBLEM SELECTOR PLAN 1
urine culture positive for E Coli and Klebsiella sensitive to ceftriaxone   will continue to monitor  completed antibiotics  episode of low BP yesterday now resolved   blood cultures x 2   repeat UA  CXR ordered
urine culture testing   growing E Coli and Klebsiella  will continue to monitor  completed antibiotics
urine culture positive for E Coli and Klebsiella sensitive to ceftriaxone   will continue to monitor  no further hypotension  blood cultures testing   repeat UA negative   CXR repeat appears negative   official read pending
urine culture positive for E Coli and Klebsiella sensitive to ceftriaxone   will continue to monitor  no further hypotension  blood cultures testing   CXR repeat negative

## 2022-08-23 NOTE — DISCHARGE NOTE NURSING/CASE MANAGEMENT/SOCIAL WORK - PATIENT PORTAL LINK FT
You can access the FollowMyHealth Patient Portal offered by [facility] by registering at the following website: [email or web address]. By joining [employer]’s FollowMyHealth portal, you will also be able to view your health information using other applications (apps) compatible with our system.

## 2022-08-23 NOTE — PROGRESS NOTE ADULT - SUBJECTIVE AND OBJECTIVE BOX
Patient is a 87y old  Female who presents with a chief complaint of cardiac workup (23 Aug 2022 13:43)      DATE OF SERVICE: 22 @ 15:34    SUBJECTIVE / OVERNIGHT EVENTS: overnight events noted    ROS:  Resp: No cough no sputum production  CVS: No chest pain no palpitations no orthopnea  GI: no N/V/D  : no dysuria, no hematuria  Neuro: no weakness no paresthesias          MEDICATIONS  (STANDING):  chlorhexidine 2% Cloths 1 Application(s) Topical <User Schedule>  levothyroxine 100 MICROGram(s) Oral daily  lidocaine   4% Patch 1 Patch Transdermal daily  metoprolol tartrate 50 milliGRAM(s) Oral two times a day  pantoprazole    Tablet 40 milliGRAM(s) Oral before breakfast  rivaroxaban 20 milliGRAM(s) Oral with dinner  simvastatin 20 milliGRAM(s) Oral at bedtime    MEDICATIONS  (PRN):  acetaminophen     Tablet .. 650 milliGRAM(s) Oral every 6 hours PRN Temp greater or equal to 38C (100.4F), Mild Pain (1 - 3), Moderate Pain (4 - 6)        CAPILLARY BLOOD GLUCOSE        I&O's Summary    22 Aug 2022 07:  -  23 Aug 2022 07:00  --------------------------------------------------------  IN: 940 mL / OUT: 1230 mL / NET: -290 mL    23 Aug 2022 07:01  -  23 Aug 2022 15:34  --------------------------------------------------------  IN: 480 mL / OUT: 400 mL / NET: 80 mL        Vital Signs Last 24 Hrs  T(C): 36.7 (23 Aug 2022 11:23), Max: 36.7 (23 Aug 2022 04:35)  T(F): 98 (23 Aug 2022 11:23), Max: 98.1 (23 Aug 2022 04:35)  HR: 72 (23 Aug 2022 11:23) (72 - 118)  BP: 103/64 (23 Aug 2022 11:23) (100/67 - 125/79)  BP(mean): --  RR: 18 (23 Aug 2022 11:23) (18 - 19)  SpO2: 97% (23 Aug 2022 11:23) (95% - 99%)    PHYSICAL EXAM:  GENERAL: NAD,  EYES: EOMI, PERRLA,   NECK: Supple, No JVD  CHEST/LUNG: Clear  HEART: S1S2; systolic murmur +   ABDOMEN: Soft, Nontender  EXTREMITIES:  trace edema  NEUROLOGY: Alert, nonfocal  SKIN: No rashes or lesions    LABS:                        10.1   6.00  )-----------( 429      ( 22 Aug 2022 07:15 )             33.2     08-    138  |  101  |  12  ----------------------------<  81  4.2   |  28  |  0.57    Ca    10.0      22 Aug 2022 07:18            Urinalysis Basic - ( 22 Aug 2022 07:16 )    Color: Colorless / Appearance: Clear / S.008 / pH: x  Gluc: x / Ketone: Negative  / Bili: Negative / Urobili: Negative   Blood: x / Protein: Negative / Nitrite: Negative   Leuk Esterase: Negative / RBC: 7 /hpf / WBC 0 /HPF   Sq Epi: x / Non Sq Epi: 0 /hpf / Bacteria: Negative          All consultant(s) notes reviewed and care discussed with other providers        Contact Number, Dr Hoyt [phone]

## 2022-08-23 NOTE — PROGRESS NOTE ADULT - NSPROGADDITIONALINFOA_GEN_ALL_CORE
discussed with patient in detail, expresses understanding of treatment plans.  stable for discharge home

## 2022-08-23 NOTE — PROGRESS NOTE ADULT - REASON FOR ADMISSION
cardiac workup

## 2022-08-23 NOTE — PROGRESS NOTE ADULT - PROBLEM SELECTOR PLAN 3
pain well controlled  no intervention at this time

## 2022-08-23 NOTE — PROGRESS NOTE ADULT - SUBJECTIVE AND OBJECTIVE BOX
C A R D I O L O G Y  **********************************     DATE OF SERVICE: 08-23-22    Patient denies chest pain or shortness of breath.   Review of symptoms otherwise negative.    acetaminophen     Tablet .. 650 milliGRAM(s) Oral every 6 hours PRN  chlorhexidine 2% Cloths 1 Application(s) Topical <User Schedule>  levothyroxine 100 MICROGram(s) Oral daily  lidocaine   4% Patch 1 Patch Transdermal daily  metoprolol tartrate 50 milliGRAM(s) Oral two times a day  pantoprazole    Tablet 40 milliGRAM(s) Oral before breakfast  rivaroxaban 20 milliGRAM(s) Oral with dinner  simvastatin 20 milliGRAM(s) Oral at bedtime                            10.1   6.00  )-----------( 429      ( 22 Aug 2022 07:15 )             33.2       Hemoglobin: 10.1 g/dL (08-22 @ 07:15)  Hemoglobin: 10.0 g/dL (08-21 @ 07:04)  Hemoglobin: 9.9 g/dL (08-19 @ 14:13)      08-22    138  |  101  |  12  ----------------------------<  81  4.2   |  28  |  0.57    Ca    10.0      22 Aug 2022 07:18      Creatinine Trend: 0.57<--, 0.48<--, 0.60<--, 0.54<--    COAGS:           T(C): 36.7 (08-23-22 @ 11:23), Max: 36.7 (08-23-22 @ 04:35)  HR: 72 (08-23-22 @ 11:23) (72 - 118)  BP: 103/64 (08-23-22 @ 11:23) (100/67 - 125/79)  RR: 18 (08-23-22 @ 11:23) (18 - 19)  SpO2: 97% (08-23-22 @ 11:23) (95% - 99%)  Wt(kg): --    I&O's Summary    22 Aug 2022 07:01  -  23 Aug 2022 07:00  --------------------------------------------------------  IN: 940 mL / OUT: 1230 mL / NET: -290 mL    23 Aug 2022 07:01  -  23 Aug 2022 13:44  --------------------------------------------------------  IN: 480 mL / OUT: 0 mL / NET: 480 mL          Gen: Appears well in NAD  HEENT:  (-)icterus (-)pallor  CV: N S1 S2 1/6 SUNDEEP (+)2 Pulses B/l  Resp:  Clear to auscultation B/L, normal effort  GI: (+) BS Soft, NT, ND  Lymph:  (-)Edema, (-)obvious lymphadenopathy  Skin: Warm to touch, Normal turgor  Psych: Appropriate mood and affect      TELEMETRY: SR/ST   ECG: Sinus Tachycardia, 1st degree AVB    < from: Transthoracic Echocardiogram (08.18.22 @ 14:01) >  Conclusions:  1. Severely dilated left atrium.  LA volume index = 55  cc/m2.  2. Normal left ventricular systolic function. No segmental  wall motion abnormalities.  3. Normal right ventricular size and function.  4. Normal tricuspid valve. Moderate tricuspid  regurgitation.  5. Estimated pulmonary artery systolic pressure equals 49  mm Hg, assuming right atrial pressure equals 8 mm Hg,  consistent with mild pulmonary pressures.  *** No previous Echo exam.    < end of copied text >    RADIOLOGY:  < from: CT Angio Chest PE Protocol w/ IV Cont (08.17.22 @ 20:56) >  IMPRESSION:    Limited evaluation of some segmental and subsegmental pulmonary arteries.   There is no pulmonary embolism to the level of the lobar arteries, or   within the visualized segmental and subsegmental branches.    Acute nondisplaced left 10th and 11th rib fractures. Small left pleural   effusion.    --- End of Report ---    < end of copied text >      ASSESSMENT/PLAN: Patient is an 86 y/o [place]-speaking Female with PMH of HTN, HLD, arthritis who was sent in by cardiologist due to tachycardia and an abnormal outpatient echo with LV dysfunction found to have a UTI and Acute nondisplaced left 10th and 11th rib fractures. Cardiology consulted for further evaluation.     - Not in clinical HF  - s/p abx per med - repeat BCx prelim neg, CXR with small L effusion  - CTA neg for PE, +L rib fx   - LE dopplers neg for DVT  - Repeat echo with normal LV/RV function - prior outpatient echo limited by tachycardia  - EP consult appreciated - suspect hx of PAF on Xarelto/Metoprolol - plan for outpatient monitor to look for PAF  - No further inpatient cardiac w/u planned  - Patient to f/u with Dr. Forbes on 8/30 at 1:15 PM    Prem Salazar PA-C  Pager: [phone]

## 2022-08-26 LAB
CULTURE RESULTS: SIGNIFICANT CHANGE UP
CULTURE RESULTS: SIGNIFICANT CHANGE UP
SPECIMEN SOURCE: SIGNIFICANT CHANGE UP
SPECIMEN SOURCE: SIGNIFICANT CHANGE UP

## 2023-02-05 NOTE — PATIENT PROFILE ADULT - NSTOBACCONEVERSMOKERY/N_GEN_A
AVS reviewed. All questions and concerns answered. No further questions at this time.    
Patient reports that she has left lower quad pain that started about 5 days ago. Low grade fevers earlier on but have gone away. Patient denies any N/V/D or constipation. BM today, normal per patient. Denies any urinary symptoms or changers.   
No

## 2023-03-14 ENCOUNTER — INPATIENT (INPATIENT)
Facility: HOSPITAL | Age: 88
LOS: 8 days | Discharge: NURSING, REHAB OR LONG-TERM CARE | End: 2023-03-23
Attending: HOSPITALIST | Admitting: HOSPITALIST
Payer: MEDICARE

## 2023-03-14 VITALS
DIASTOLIC BLOOD PRESSURE: 60 MMHG | SYSTOLIC BLOOD PRESSURE: 128 MMHG | OXYGEN SATURATION: 98 % | TEMPERATURE: 98 F | HEART RATE: 92 BPM | RESPIRATION RATE: 20 BRPM

## 2023-03-14 DIAGNOSIS — J69.0 PNEUMONITIS DUE TO INHALATION OF FOOD AND VOMIT: ICD-10-CM

## 2023-03-14 DIAGNOSIS — Z98.890 OTHER SPECIFIED POSTPROCEDURAL STATES: Chronic | ICD-10-CM

## 2023-03-14 LAB
ALBUMIN SERPL ELPH-MCNC: 3 G/DL — LOW (ref 3.3–5)
ALP SERPL-CCNC: 71 U/L — SIGNIFICANT CHANGE UP (ref 40–120)
ALT FLD-CCNC: <5 U/L — SIGNIFICANT CHANGE UP (ref 4–33)
ANION GAP SERPL CALC-SCNC: 10 MMOL/L — SIGNIFICANT CHANGE UP (ref 7–14)
APPEARANCE UR: ABNORMAL
AST SERPL-CCNC: 11 U/L — SIGNIFICANT CHANGE UP (ref 4–32)
BACTERIA # UR AUTO: ABNORMAL
BASOPHILS # BLD AUTO: 0.04 K/UL — SIGNIFICANT CHANGE UP (ref 0–0.2)
BASOPHILS NFR BLD AUTO: 0.7 % — SIGNIFICANT CHANGE UP (ref 0–2)
BILIRUB SERPL-MCNC: 0.2 MG/DL — SIGNIFICANT CHANGE UP (ref 0.2–1.2)
BILIRUB UR-MCNC: NEGATIVE — SIGNIFICANT CHANGE UP
BUN SERPL-MCNC: 13 MG/DL — SIGNIFICANT CHANGE UP (ref 7–23)
CALCIUM SERPL-MCNC: 10.3 MG/DL — SIGNIFICANT CHANGE UP (ref 8.4–10.5)
CHLORIDE SERPL-SCNC: 100 MMOL/L — SIGNIFICANT CHANGE UP (ref 98–107)
CO2 SERPL-SCNC: 32 MMOL/L — HIGH (ref 22–31)
COLOR SPEC: YELLOW — SIGNIFICANT CHANGE UP
CREAT SERPL-MCNC: 0.49 MG/DL — LOW (ref 0.5–1.3)
DIFF PNL FLD: ABNORMAL
EGFR: 91 ML/MIN/1.73M2 — SIGNIFICANT CHANGE UP
EOSINOPHIL # BLD AUTO: 0.14 K/UL — SIGNIFICANT CHANGE UP (ref 0–0.5)
EOSINOPHIL NFR BLD AUTO: 2.4 % — SIGNIFICANT CHANGE UP (ref 0–6)
EPI CELLS # UR: 1 /HPF — SIGNIFICANT CHANGE UP (ref 0–5)
FLUAV AG NPH QL: SIGNIFICANT CHANGE UP
FLUBV AG NPH QL: SIGNIFICANT CHANGE UP
GLUCOSE SERPL-MCNC: 126 MG/DL — HIGH (ref 70–99)
GLUCOSE UR QL: NEGATIVE — SIGNIFICANT CHANGE UP
HCT VFR BLD CALC: 31.2 % — LOW (ref 34.5–45)
HGB BLD-MCNC: 9.2 G/DL — LOW (ref 11.5–15.5)
HYALINE CASTS # UR AUTO: 2 /LPF — SIGNIFICANT CHANGE UP (ref 0–7)
IANC: 3.71 K/UL — SIGNIFICANT CHANGE UP (ref 1.8–7.4)
IMM GRANULOCYTES NFR BLD AUTO: 0.3 % — SIGNIFICANT CHANGE UP (ref 0–0.9)
KETONES UR-MCNC: NEGATIVE — SIGNIFICANT CHANGE UP
LEUKOCYTE ESTERASE UR-ACNC: ABNORMAL
LYMPHOCYTES # BLD AUTO: 1.39 K/UL — SIGNIFICANT CHANGE UP (ref 1–3.3)
LYMPHOCYTES # BLD AUTO: 23.6 % — SIGNIFICANT CHANGE UP (ref 13–44)
MCHC RBC-ENTMCNC: 24.8 PG — LOW (ref 27–34)
MCHC RBC-ENTMCNC: 29.5 GM/DL — LOW (ref 32–36)
MCV RBC AUTO: 84.1 FL — SIGNIFICANT CHANGE UP (ref 80–100)
MONOCYTES # BLD AUTO: 0.59 K/UL — SIGNIFICANT CHANGE UP (ref 0–0.9)
MONOCYTES NFR BLD AUTO: 10 % — SIGNIFICANT CHANGE UP (ref 2–14)
NEUTROPHILS # BLD AUTO: 3.71 K/UL — SIGNIFICANT CHANGE UP (ref 1.8–7.4)
NEUTROPHILS NFR BLD AUTO: 63 % — SIGNIFICANT CHANGE UP (ref 43–77)
NITRITE UR-MCNC: POSITIVE
NRBC # BLD: 0 /100 WBCS — SIGNIFICANT CHANGE UP (ref 0–0)
NRBC # FLD: 0 K/UL — SIGNIFICANT CHANGE UP (ref 0–0)
PH UR: 6.5 — SIGNIFICANT CHANGE UP (ref 5–8)
PLATELET # BLD AUTO: 361 K/UL — SIGNIFICANT CHANGE UP (ref 150–400)
POTASSIUM SERPL-MCNC: 3.1 MMOL/L — LOW (ref 3.5–5.3)
POTASSIUM SERPL-SCNC: 3.1 MMOL/L — LOW (ref 3.5–5.3)
PROT SERPL-MCNC: 5.8 G/DL — LOW (ref 6–8.3)
PROT UR-MCNC: ABNORMAL
RBC # BLD: 3.71 M/UL — LOW (ref 3.8–5.2)
RBC # FLD: 15.3 % — HIGH (ref 10.3–14.5)
RBC CASTS # UR COMP ASSIST: 21 /HPF — HIGH (ref 0–4)
RSV RNA NPH QL NAA+NON-PROBE: SIGNIFICANT CHANGE UP
SARS-COV-2 RNA SPEC QL NAA+PROBE: SIGNIFICANT CHANGE UP
SODIUM SERPL-SCNC: 142 MMOL/L — SIGNIFICANT CHANGE UP (ref 135–145)
SP GR SPEC: 1.02 — SIGNIFICANT CHANGE UP (ref 1.01–1.05)
UROBILINOGEN FLD QL: ABNORMAL
WBC # BLD: 5.89 K/UL — SIGNIFICANT CHANGE UP (ref 3.8–10.5)
WBC # FLD AUTO: 5.89 K/UL — SIGNIFICANT CHANGE UP (ref 3.8–10.5)
WBC UR QL: 34 /HPF — HIGH (ref 0–5)

## 2023-03-14 PROCEDURE — 71045 X-RAY EXAM CHEST 1 VIEW: CPT | Mod: 26

## 2023-03-14 PROCEDURE — 99285 EMERGENCY DEPT VISIT HI MDM: CPT | Mod: FS

## 2023-03-14 RX ORDER — POTASSIUM CHLORIDE 20 MEQ
40 PACKET (EA) ORAL ONCE
Refills: 0 | Status: COMPLETED | OUTPATIENT
Start: 2023-03-14 | End: 2023-03-14

## 2023-03-14 RX ORDER — POTASSIUM CHLORIDE 20 MEQ
40 PACKET (EA) ORAL ONCE
Refills: 0 | Status: DISCONTINUED | OUTPATIENT
Start: 2023-03-14 | End: 2023-03-14

## 2023-03-14 RX ORDER — PIPERACILLIN AND TAZOBACTAM 4; .5 G/20ML; G/20ML
3.38 INJECTION, POWDER, LYOPHILIZED, FOR SOLUTION INTRAVENOUS ONCE
Refills: 0 | Status: COMPLETED | OUTPATIENT
Start: 2023-03-14 | End: 2023-03-14

## 2023-03-14 RX ADMIN — Medication 40 MILLIEQUIVALENT(S): at 23:33

## 2023-03-14 RX ADMIN — PIPERACILLIN AND TAZOBACTAM 200 GRAM(S): 4; .5 INJECTION, POWDER, LYOPHILIZED, FOR SOLUTION INTRAVENOUS at 20:42

## 2023-03-14 NOTE — ED PROVIDER NOTE - PROGRESS NOTE DETAILS
PA Pollock- Xray shows Lt basilar opacity. Urine also noted to be cloudy and foul smelling as per RN, pending official UA result. Pt given dose of zosyn. Plan for admission. Pt daughter called and updated on results and need for admission. Agrees with plan. Spoke to hospitalist who accepted patient. ECG is NSR, not in afib. No need for tele at this time.

## 2023-03-14 NOTE — ED PROVIDER NOTE - NS ED ATTENDING STATEMENT MOD
This was a shared visit with the MADINA. I reviewed and verified the documentation and independently performed the documented:

## 2023-03-14 NOTE — ED PROVIDER NOTE - PHYSICAL EXAMINATION
Vital signs reviewed.   CONSTITUTIONAL: well-nourished; in no apparent distress. Non-toxic appearing/ but generally weak appearing.   HEAD: Normocephalic, atraumatic.  EYES: PERRL, EOM intact, conjunctiva and sclera WNL.  CARD: Normal S1, S2; no murmurs, rubs, or gallops noted.  RESP: Normal chest excursion with respiration; breath sounds clear and equal bilaterally; no wheezes, ++rhonchi in b/l bases   ABD/GI: soft, non-distended; non-tender; no palpable organomegaly, no pulsatile mass.  EXT/MS: moves all extremities; distal pulses are normal, no pedal edema.  SKIN: Normal for age and race; warm; dry; good turgor; no apparent lesions or exudate noted.  NEURO: Awake, alert, oriented x 3, no gross deficits, CN II-XII grossly intact, no motor or sensory deficit noted.  PSYCH: Normal mood; appropriate affect.

## 2023-03-14 NOTE — ED ADULT TRIAGE NOTE - CHIEF COMPLAINT QUOTE
Pt c/o SOB X 1 week. Pt was diagnosed with pneumonia earlier today after having an XR performed. Sent by Dr. Albertina Yi. O2 >95% room air. +cough. Phx: arthritis, hip replacement. HTN

## 2023-03-14 NOTE — ED PROVIDER NOTE - OBJECTIVE STATEMENT
88-year-old female with past medical history of A-fib on Xarelto, GERD, hypothyroid, hypertension, hyperlipidemia, arthritis that causes increased difficulty with mobility presents to the ER complaining of shortness of breath x1 week generalized fatigue, generalized weakness and intermittent episodes of nausea and vomiting last time she vomited was 3 days ago.  Patient daughter reports that approximately 1 week ago patient choked on a pill and after this she started to have increasing cough with foamy sputum nonbloody no hemoptysis.  Patient saw her primary doctor Albertina Yi who performed a chest x-ray with concern for possible aspiration pneumonia.  And sent patient to the ER for further evaluation.  Daughter also notes patient has had foul-smelling urine.  Patient denies dysuria, hematuria, change in urination but notes that she frequently urinates at night.  Daughter also notes patient complains of intermittent palpitations which could be associated with her A-fib.  Patient currently taking metoprolol.  Patient denies fevers, chills, night sweats, chest pain, diarrhea

## 2023-03-14 NOTE — ED PROVIDER NOTE - ATTENDING APP SHARED VISIT CONTRIBUTION OF CARE
I have personally performed a face to face medical and diagnostic evaluation of the patient. I have discussed with and reviewed the MADINA's note and agree with the History, ROS, Physical Exam and MDM unless otherwise indicated. A brief summary of my personal evaluation and impression can be found below.    Douglas ESCALANTE: 88-year-old female, history of A-fib on Xarelto GERD hypothyroid hypertension hyperlipidemia arthritis, that causes decreased mobility, presents with a chief complaint of shortness of breath with 1 week associate with generalized fatigue weakness and intermittent episodes of nausea vomiting, last time she vomited was 3 days ago, per daughter patient, coughed and choked on a pill possibly about a week ago and that she has had increasing cough with foamy sputum nonbloody no hemoptysis.  Was seen by PMD today who did a chest x-ray with concern for possible aspiration pneumonia, sent to the ER for further evaluation, daughter also notes patient has foul-smelling urine, patient has no urinary complaints, but notes that she does urinate a lot at night.  Has intermittent palpitations consistent with her A-fib, no fever per patient.  Patient is limited historian history is primarily obtained through daughter at bedside.    All other ROS negative, except as above and as per HPI and ROS section.    VITALS: Initial triage and subsequent vitals have been reviewed by me.  GEN APPEARANCE: WDWN, alert, non-toxic, NAD  HEAD: Atraumatic.  EYES: PERRLa, EOMI, vision grossly intact.   NECK: Supple  CV: RRR, S1S2, no c/r/m/g. Cap refill <2 seconds. No bruits.   LUNGS: Trace bilateral crackles  ABDOMEN: Soft, NTND. No guarding or rebound.   MSK/EXT: No spinal or extremity point tenderness. No CVA ttp. Pelvis stable. No peripheral edema.  NEURO: Alert, follows commands. Weight bearing normal. Speech normal. Sensation and motor normal x4 extremities.   SKIN: Warm, dry and intact. No rash.  PSYCH: Appropriate      Plan/MDM: Exam vital signs stable nontoxic-appearing physical exam as above, DDx concern for likely aspiration versus community-acquired pneumonia, no recent hospitalizations, possible also concern for UTI given foul-smelling urine appearing, will check basic labs EKG urine, give meds as needed and reassess at however anticipate admission.

## 2023-03-14 NOTE — ED PROVIDER NOTE - CLINICAL SUMMARY MEDICAL DECISION MAKING FREE TEXT BOX
88-year-old female with past medical history of A-fib on Xarelto, GERD, hypothyroid, hypertension, hyperlipidemia, arthritis that causes increased difficulty with mobility presents to the ER complaining of shortness of breath x1 week generalized fatigue, generalized weakness and intermittent episodes of nausea and vomiting last time she vomited was 3 days ago.  Patient daughter reports that approximately 1 week ago patient choked on a pill and after this she started to have increasing cough with foamy sputum nonbloody no hemoptysis.  Patient saw her primary doctor Albertina Yi who performed a chest x-ray with concern for possible aspiration pneumonia.  And sent patient to the ER for further evaluation.  Daughter also notes patient has had foul-smelling urine.  Patient denies dysuria, hematuria, change in urination but notes that she frequently urinates at night.  Daughter also notes patient complains of intermittent palpitations which could be associated with her A-fib.  eval for pna, uti. labs,urine.  likely admit for abx and PT eval// rehab 2/2 to weakness

## 2023-03-14 NOTE — ED ADULT NURSE NOTE - OBJECTIVE STATEMENT
Pt was received into spot 20. Pt is AxO4 and ambulatory. Pt c/o SOB X 1 week, and was recently diagnosed with pneumonia earlier today after chest X-ray was performed. pt has a nonproductive cough. Respirations are even and unlabored.  Pulse equal in all extremities, no edema noted. Abdomen is soft, nontender, and nondistended. Phx: arthritis, hip replacement. HTN Pt was received into spot 20. Pt is AxO4 and ambulatory. pt is Uzbek speaking with the daughter at bedside to translate. Pt c/o SOB X 1 week, and was recently diagnosed with pneumonia earlier today after chest X-ray was performed. pt has a nonproductive cough. Respirations are even and unlabored.  Pulse equal in all extremities, no edema noted. Abdomen is soft, nontender, and nondistended. Pt denies difficulty urinating Phx: arthritis, hip replacement. HTN pt has a 20 G in the left wrist. All labs were obtained and sent. normal sinus on the tele monitor. Will continue to monitor. Pt was received into spot 20. Pt is AxO4 and ambulatory. pt is R[place]an speaking with the daughter at bedside to translate. Pt c/o SOB X 1 week, and was recently diagnosed with pneumonia earlier today after chest X-ray was performed. pt has a nonproductive cough. Respirations are even and unlabored.  Pulse equal in all extremities, no edema noted. Abdomen is soft, nontender, and nondistended. Pt denies difficulty urinating/passing stool, SOB, chest pain, headache, or dizziness. Phx: arthritis, hip replacement. HTN pt has a 20 G in the left wrist. All labs were obtained and sent. normal sinus on the tele monitor. Will continue to monitor.

## 2023-03-15 DIAGNOSIS — E03.9 HYPOTHYROIDISM, UNSPECIFIED: ICD-10-CM

## 2023-03-15 DIAGNOSIS — J69.0 PNEUMONITIS DUE TO INHALATION OF FOOD AND VOMIT: ICD-10-CM

## 2023-03-15 DIAGNOSIS — Z29.9 ENCOUNTER FOR PROPHYLACTIC MEASURES, UNSPECIFIED: ICD-10-CM

## 2023-03-15 DIAGNOSIS — I48.91 UNSPECIFIED ATRIAL FIBRILLATION: ICD-10-CM

## 2023-03-15 DIAGNOSIS — D64.9 ANEMIA, UNSPECIFIED: ICD-10-CM

## 2023-03-15 DIAGNOSIS — N39.0 URINARY TRACT INFECTION, SITE NOT SPECIFIED: ICD-10-CM

## 2023-03-15 LAB
ALBUMIN SERPL ELPH-MCNC: 2.4 G/DL — LOW (ref 3.3–5)
ALP SERPL-CCNC: 62 U/L — SIGNIFICANT CHANGE UP (ref 40–120)
ALT FLD-CCNC: 6 U/L — SIGNIFICANT CHANGE UP (ref 4–33)
ANION GAP SERPL CALC-SCNC: 12 MMOL/L — SIGNIFICANT CHANGE UP (ref 7–14)
ANION GAP SERPL CALC-SCNC: 12 MMOL/L — SIGNIFICANT CHANGE UP (ref 7–14)
APTT BLD: 33.9 SEC — SIGNIFICANT CHANGE UP (ref 27–36.3)
AST SERPL-CCNC: 17 U/L — SIGNIFICANT CHANGE UP (ref 4–32)
BILIRUB SERPL-MCNC: 0.3 MG/DL — SIGNIFICANT CHANGE UP (ref 0.2–1.2)
BLOOD GAS ARTERIAL - LYTES,HGB,ICA,LACT RESULT: SIGNIFICANT CHANGE UP
BLOOD GAS ARTERIAL COMPREHENSIVE RESULT: SIGNIFICANT CHANGE UP
BLOOD GAS VENOUS COMPREHENSIVE RESULT: SIGNIFICANT CHANGE UP
BUN SERPL-MCNC: 10 MG/DL — SIGNIFICANT CHANGE UP (ref 7–23)
BUN SERPL-MCNC: 17 MG/DL — SIGNIFICANT CHANGE UP (ref 7–23)
CALCIUM SERPL-MCNC: 9.6 MG/DL — SIGNIFICANT CHANGE UP (ref 8.4–10.5)
CALCIUM SERPL-MCNC: 9.7 MG/DL — SIGNIFICANT CHANGE UP (ref 8.4–10.5)
CHLORIDE SERPL-SCNC: 101 MMOL/L — SIGNIFICANT CHANGE UP (ref 98–107)
CHLORIDE SERPL-SCNC: 101 MMOL/L — SIGNIFICANT CHANGE UP (ref 98–107)
CK MB BLD-MCNC: 5.3 % — HIGH (ref 0–2.5)
CK MB BLD-MCNC: 8.8 % — HIGH (ref 0–2.5)
CK MB CFR SERPL CALC: 1.6 NG/ML — SIGNIFICANT CHANGE UP
CK MB CFR SERPL CALC: 2.3 NG/ML — SIGNIFICANT CHANGE UP
CK SERPL-CCNC: 26 U/L — SIGNIFICANT CHANGE UP (ref 25–170)
CK SERPL-CCNC: 30 U/L — SIGNIFICANT CHANGE UP (ref 25–170)
CO2 SERPL-SCNC: 22 MMOL/L — SIGNIFICANT CHANGE UP (ref 22–31)
CO2 SERPL-SCNC: 26 MMOL/L — SIGNIFICANT CHANGE UP (ref 22–31)
CREAT SERPL-MCNC: 0.5 MG/DL — SIGNIFICANT CHANGE UP (ref 0.5–1.3)
CREAT SERPL-MCNC: 1.25 MG/DL — SIGNIFICANT CHANGE UP (ref 0.5–1.3)
EGFR: 41 ML/MIN/1.73M2 — LOW
EGFR: 90 ML/MIN/1.73M2 — SIGNIFICANT CHANGE UP
GAS PNL BLDA: SIGNIFICANT CHANGE UP
GLUCOSE BLDC GLUCOMTR-MCNC: 101 MG/DL — HIGH (ref 70–99)
GLUCOSE SERPL-MCNC: 120 MG/DL — HIGH (ref 70–99)
GLUCOSE SERPL-MCNC: 156 MG/DL — HIGH (ref 70–99)
HCT VFR BLD CALC: 36.4 % — SIGNIFICANT CHANGE UP (ref 34.5–45)
HGB BLD-MCNC: 10.7 G/DL — LOW (ref 11.5–15.5)
INR BLD: 1.93 RATIO — HIGH (ref 0.88–1.16)
MAGNESIUM SERPL-MCNC: 1.5 MG/DL — LOW (ref 1.6–2.6)
MAGNESIUM SERPL-MCNC: 1.7 MG/DL — SIGNIFICANT CHANGE UP (ref 1.6–2.6)
MCHC RBC-ENTMCNC: 25.4 PG — LOW (ref 27–34)
MCHC RBC-ENTMCNC: 29.4 GM/DL — LOW (ref 32–36)
MCV RBC AUTO: 86.5 FL — SIGNIFICANT CHANGE UP (ref 80–100)
NRBC # BLD: 0 /100 WBCS — SIGNIFICANT CHANGE UP (ref 0–0)
NRBC # FLD: 0.04 K/UL — HIGH (ref 0–0)
NT-PROBNP SERPL-SCNC: 530 PG/ML — HIGH
PHOSPHATE SERPL-MCNC: 2.4 MG/DL — LOW (ref 2.5–4.5)
PHOSPHATE SERPL-MCNC: 4.5 MG/DL — SIGNIFICANT CHANGE UP (ref 2.5–4.5)
PLATELET # BLD AUTO: 463 K/UL — HIGH (ref 150–400)
POTASSIUM SERPL-MCNC: 4 MMOL/L — SIGNIFICANT CHANGE UP (ref 3.5–5.3)
POTASSIUM SERPL-MCNC: 5 MMOL/L — SIGNIFICANT CHANGE UP (ref 3.5–5.3)
POTASSIUM SERPL-SCNC: 4 MMOL/L — SIGNIFICANT CHANGE UP (ref 3.5–5.3)
POTASSIUM SERPL-SCNC: 5 MMOL/L — SIGNIFICANT CHANGE UP (ref 3.5–5.3)
PROT SERPL-MCNC: 5.7 G/DL — LOW (ref 6–8.3)
PROTHROM AB SERPL-ACNC: 22.5 SEC — HIGH (ref 10.5–13.4)
RBC # BLD: 4.21 M/UL — SIGNIFICANT CHANGE UP (ref 3.8–5.2)
RBC # FLD: 15.6 % — HIGH (ref 10.3–14.5)
SODIUM SERPL-SCNC: 135 MMOL/L — SIGNIFICANT CHANGE UP (ref 135–145)
SODIUM SERPL-SCNC: 139 MMOL/L — SIGNIFICANT CHANGE UP (ref 135–145)
T4 FREE SERPL-MCNC: 1.4 NG/DL — SIGNIFICANT CHANGE UP (ref 0.9–1.8)
TROPONIN T, HIGH SENSITIVITY RESULT: 74 NG/L — CRITICAL HIGH
TROPONIN T, HIGH SENSITIVITY RESULT: 86 NG/L — CRITICAL HIGH
TSH SERPL-MCNC: 9.31 UIU/ML — HIGH (ref 0.27–4.2)
WBC # BLD: 7.23 K/UL — SIGNIFICANT CHANGE UP (ref 3.8–10.5)
WBC # FLD AUTO: 7.23 K/UL — SIGNIFICANT CHANGE UP (ref 3.8–10.5)

## 2023-03-15 PROCEDURE — 93306 TTE W/DOPPLER COMPLETE: CPT | Mod: 26

## 2023-03-15 PROCEDURE — 71045 X-RAY EXAM CHEST 1 VIEW: CPT | Mod: 26

## 2023-03-15 PROCEDURE — 99223 1ST HOSP IP/OBS HIGH 75: CPT

## 2023-03-15 PROCEDURE — 99291 CRITICAL CARE FIRST HOUR: CPT

## 2023-03-15 PROCEDURE — 93308 TTE F-UP OR LMTD: CPT | Mod: 26

## 2023-03-15 PROCEDURE — 70450 CT HEAD/BRAIN W/O DYE: CPT | Mod: 26

## 2023-03-15 PROCEDURE — 76604 US EXAM CHEST: CPT | Mod: 26

## 2023-03-15 RX ORDER — RIVAROXABAN 15 MG-20MG
20 KIT ORAL
Refills: 0 | Status: DISCONTINUED | OUTPATIENT
Start: 2023-03-15 | End: 2023-03-15

## 2023-03-15 RX ORDER — PROPOFOL 10 MG/ML
10 INJECTION, EMULSION INTRAVENOUS
Qty: 1000 | Refills: 0 | Status: DISCONTINUED | OUTPATIENT
Start: 2023-03-15 | End: 2023-03-16

## 2023-03-15 RX ORDER — PIPERACILLIN AND TAZOBACTAM 4; .5 G/20ML; G/20ML
3.38 INJECTION, POWDER, LYOPHILIZED, FOR SOLUTION INTRAVENOUS EVERY 8 HOURS
Refills: 0 | Status: DISCONTINUED | OUTPATIENT
Start: 2023-03-15 | End: 2023-03-15

## 2023-03-15 RX ORDER — FENTANYL CITRATE 50 UG/ML
50 INJECTION INTRAVENOUS
Refills: 0 | Status: DISCONTINUED | OUTPATIENT
Start: 2023-03-15 | End: 2023-03-17

## 2023-03-15 RX ORDER — LEVOTHYROXINE SODIUM 125 MCG
70 TABLET ORAL AT BEDTIME
Refills: 0 | Status: DISCONTINUED | OUTPATIENT
Start: 2023-03-15 | End: 2023-03-18

## 2023-03-15 RX ORDER — PIPERACILLIN AND TAZOBACTAM 4; .5 G/20ML; G/20ML
3.38 INJECTION, POWDER, LYOPHILIZED, FOR SOLUTION INTRAVENOUS EVERY 12 HOURS
Refills: 0 | Status: DISCONTINUED | OUTPATIENT
Start: 2023-03-15 | End: 2023-03-19

## 2023-03-15 RX ORDER — BUMETANIDE 0.25 MG/ML
2 INJECTION INTRAMUSCULAR; INTRAVENOUS ONCE
Refills: 0 | Status: COMPLETED | OUTPATIENT
Start: 2023-03-15 | End: 2023-03-15

## 2023-03-15 RX ORDER — NOREPINEPHRINE BITARTRATE/D5W 8 MG/250ML
0.05 PLASTIC BAG, INJECTION (ML) INTRAVENOUS
Qty: 8 | Refills: 0 | Status: DISCONTINUED | OUTPATIENT
Start: 2023-03-15 | End: 2023-03-17

## 2023-03-15 RX ORDER — CHLORHEXIDINE GLUCONATE 213 G/1000ML
1 SOLUTION TOPICAL
Refills: 0 | Status: DISCONTINUED | OUTPATIENT
Start: 2023-03-15 | End: 2023-03-18

## 2023-03-15 RX ORDER — FUROSEMIDE 40 MG
20 TABLET ORAL ONCE
Refills: 0 | Status: COMPLETED | OUTPATIENT
Start: 2023-03-15 | End: 2023-03-15

## 2023-03-15 RX ORDER — PANTOPRAZOLE SODIUM 20 MG/1
40 TABLET, DELAYED RELEASE ORAL
Refills: 0 | Status: DISCONTINUED | OUTPATIENT
Start: 2023-03-15 | End: 2023-03-15

## 2023-03-15 RX ORDER — PHENYLEPHRINE HYDROCHLORIDE 10 MG/ML
0.5 INJECTION INTRAVENOUS
Qty: 40 | Refills: 0 | Status: DISCONTINUED | OUTPATIENT
Start: 2023-03-15 | End: 2023-03-15

## 2023-03-15 RX ORDER — DEXMEDETOMIDINE HYDROCHLORIDE IN 0.9% SODIUM CHLORIDE 4 UG/ML
0.2 INJECTION INTRAVENOUS
Qty: 400 | Refills: 0 | Status: DISCONTINUED | OUTPATIENT
Start: 2023-03-15 | End: 2023-03-16

## 2023-03-15 RX ORDER — INFLUENZA VIRUS VACCINE 15; 15; 15; 15 UG/.5ML; UG/.5ML; UG/.5ML; UG/.5ML
0.7 SUSPENSION INTRAMUSCULAR ONCE
Refills: 0 | Status: DISCONTINUED | OUTPATIENT
Start: 2023-03-15 | End: 2023-03-15

## 2023-03-15 RX ORDER — PANTOPRAZOLE SODIUM 20 MG/1
1 TABLET, DELAYED RELEASE ORAL
Qty: 0 | Refills: 0 | DISCHARGE

## 2023-03-15 RX ORDER — RIVAROXABAN 15 MG-20MG
1 KIT ORAL
Qty: 0 | Refills: 0 | DISCHARGE

## 2023-03-15 RX ORDER — SODIUM CHLORIDE 9 MG/ML
1000 INJECTION INTRAMUSCULAR; INTRAVENOUS; SUBCUTANEOUS
Refills: 0 | Status: DISCONTINUED | OUTPATIENT
Start: 2023-03-15 | End: 2023-03-15

## 2023-03-15 RX ORDER — HEPARIN SODIUM 5000 [USP'U]/ML
2500 INJECTION INTRAVENOUS; SUBCUTANEOUS EVERY 6 HOURS
Refills: 0 | Status: DISCONTINUED | OUTPATIENT
Start: 2023-03-15 | End: 2023-03-18

## 2023-03-15 RX ORDER — CEFTRIAXONE 500 MG/1
1000 INJECTION, POWDER, FOR SOLUTION INTRAMUSCULAR; INTRAVENOUS EVERY 24 HOURS
Refills: 0 | Status: DISCONTINUED | OUTPATIENT
Start: 2023-03-15 | End: 2023-03-15

## 2023-03-15 RX ORDER — HEPARIN SODIUM 5000 [USP'U]/ML
INJECTION INTRAVENOUS; SUBCUTANEOUS
Qty: 25000 | Refills: 0 | Status: DISCONTINUED | OUTPATIENT
Start: 2023-03-15 | End: 2023-03-18

## 2023-03-15 RX ORDER — MIDAZOLAM HYDROCHLORIDE 1 MG/ML
4 INJECTION, SOLUTION INTRAMUSCULAR; INTRAVENOUS ONCE
Refills: 0 | Status: DISCONTINUED | OUTPATIENT
Start: 2023-03-15 | End: 2023-03-15

## 2023-03-15 RX ORDER — POTASSIUM PHOSPHATE, MONOBASIC POTASSIUM PHOSPHATE, DIBASIC 236; 224 MG/ML; MG/ML
30 INJECTION, SOLUTION INTRAVENOUS ONCE
Refills: 0 | Status: COMPLETED | OUTPATIENT
Start: 2023-03-15 | End: 2023-03-15

## 2023-03-15 RX ORDER — PANTOPRAZOLE SODIUM 20 MG/1
40 TABLET, DELAYED RELEASE ORAL DAILY
Refills: 0 | Status: DISCONTINUED | OUTPATIENT
Start: 2023-03-15 | End: 2023-03-18

## 2023-03-15 RX ORDER — ASPIRIN/CALCIUM CARB/MAGNESIUM 324 MG
324 TABLET ORAL ONCE
Refills: 0 | Status: COMPLETED | OUTPATIENT
Start: 2023-03-15 | End: 2023-03-15

## 2023-03-15 RX ORDER — MIDAZOLAM HYDROCHLORIDE 1 MG/ML
0.02 INJECTION, SOLUTION INTRAMUSCULAR; INTRAVENOUS
Qty: 100 | Refills: 0 | Status: DISCONTINUED | OUTPATIENT
Start: 2023-03-15 | End: 2023-03-15

## 2023-03-15 RX ORDER — LEVOTHYROXINE SODIUM 125 MCG
1 TABLET ORAL
Qty: 0 | Refills: 0 | DISCHARGE

## 2023-03-15 RX ORDER — HEPARIN SODIUM 5000 [USP'U]/ML
5000 INJECTION INTRAVENOUS; SUBCUTANEOUS EVERY 6 HOURS
Refills: 0 | Status: DISCONTINUED | OUTPATIENT
Start: 2023-03-15 | End: 2023-03-18

## 2023-03-15 RX ORDER — CHLORHEXIDINE GLUCONATE 213 G/1000ML
15 SOLUTION TOPICAL EVERY 12 HOURS
Refills: 0 | Status: DISCONTINUED | OUTPATIENT
Start: 2023-03-15 | End: 2023-03-17

## 2023-03-15 RX ORDER — MAGNESIUM SULFATE 500 MG/ML
2 VIAL (ML) INJECTION ONCE
Refills: 0 | Status: COMPLETED | OUTPATIENT
Start: 2023-03-15 | End: 2023-03-15

## 2023-03-15 RX ORDER — LEVOTHYROXINE SODIUM 125 MCG
100 TABLET ORAL DAILY
Refills: 0 | Status: DISCONTINUED | OUTPATIENT
Start: 2023-03-15 | End: 2023-03-15

## 2023-03-15 RX ORDER — METOPROLOL TARTRATE 50 MG
50 TABLET ORAL DAILY
Refills: 0 | Status: DISCONTINUED | OUTPATIENT
Start: 2023-03-15 | End: 2023-03-15

## 2023-03-15 RX ADMIN — MIDAZOLAM HYDROCHLORIDE 1.25 MG/KG/HR: 1 INJECTION, SOLUTION INTRAMUSCULAR; INTRAVENOUS at 10:10

## 2023-03-15 RX ADMIN — Medication 70 MICROGRAM(S): at 21:52

## 2023-03-15 RX ADMIN — DEXMEDETOMIDINE HYDROCHLORIDE IN 0.9% SODIUM CHLORIDE 3.13 MICROGRAM(S)/KG/HR: 4 INJECTION INTRAVENOUS at 20:03

## 2023-03-15 RX ADMIN — CHLORHEXIDINE GLUCONATE 15 MILLILITER(S): 213 SOLUTION TOPICAL at 21:36

## 2023-03-15 RX ADMIN — HEPARIN SODIUM 1100 UNIT(S)/HR: 5000 INJECTION INTRAVENOUS; SUBCUTANEOUS at 21:36

## 2023-03-15 RX ADMIN — CEFTRIAXONE 100 MILLIGRAM(S): 500 INJECTION, POWDER, FOR SOLUTION INTRAMUSCULAR; INTRAVENOUS at 05:38

## 2023-03-15 RX ADMIN — PROPOFOL 3.75 MICROGRAM(S)/KG/MIN: 10 INJECTION, EMULSION INTRAVENOUS at 10:10

## 2023-03-15 RX ADMIN — Medication 20 MILLIGRAM(S): at 17:30

## 2023-03-15 RX ADMIN — Medication 100 MICROGRAM(S): at 05:37

## 2023-03-15 RX ADMIN — POTASSIUM PHOSPHATE, MONOBASIC POTASSIUM PHOSPHATE, DIBASIC 83.33 MILLIMOLE(S): 236; 224 INJECTION, SOLUTION INTRAVENOUS at 15:12

## 2023-03-15 RX ADMIN — Medication 5.86 MICROGRAM(S)/KG/MIN: at 10:14

## 2023-03-15 RX ADMIN — Medication 324 MILLIGRAM(S): at 21:35

## 2023-03-15 RX ADMIN — SODIUM CHLORIDE 80 MILLILITER(S): 9 INJECTION INTRAMUSCULAR; INTRAVENOUS; SUBCUTANEOUS at 05:38

## 2023-03-15 RX ADMIN — Medication 50 MILLIGRAM(S): at 05:38

## 2023-03-15 RX ADMIN — BUMETANIDE 2 MILLIGRAM(S): 0.25 INJECTION INTRAMUSCULAR; INTRAVENOUS at 21:36

## 2023-03-15 RX ADMIN — Medication 25 GRAM(S): at 18:53

## 2023-03-15 RX ADMIN — PIPERACILLIN AND TAZOBACTAM 25 GRAM(S): 4; .5 INJECTION, POWDER, LYOPHILIZED, FOR SOLUTION INTRAVENOUS at 15:12

## 2023-03-15 RX ADMIN — PANTOPRAZOLE SODIUM 40 MILLIGRAM(S): 20 TABLET, DELAYED RELEASE ORAL at 05:37

## 2023-03-15 RX ADMIN — PANTOPRAZOLE SODIUM 40 MILLIGRAM(S): 20 TABLET, DELAYED RELEASE ORAL at 12:42

## 2023-03-15 RX ADMIN — Medication 5.86 MICROGRAM(S)/KG/MIN: at 19:35

## 2023-03-15 NOTE — PATIENT PROFILE ADULT - TRANSPORTATION
Recommendations in caring for Jose A:    Limp and groin pain, left--    Await lab and x-ray results--  No PE or outside recess next week.   Recommend supportive cares including ibuprofen.  Recommend rest from high activity.   Call with update Thur, sooner with worsening signs/symptoms.   Needs to be seen in ED over the weekend if develops fever, severe pain or seems sick.     
no

## 2023-03-15 NOTE — SWALLOW BEDSIDE ASSESSMENT ADULT - COMMENTS
Clinical Swallow Evaluation order received. However per chart review and discussion with ACP, patient now intubated. Reconsult this department as patient's condition becomes medically optimized.

## 2023-03-15 NOTE — PROGRESS NOTE ADULT - SUBJECTIVE AND OBJECTIVE BOX
INTERVAL HPI/OVERNIGHT EVENTS:    HPI:    SUBJECTIVE: Patient seen and examined at bedside.     CONSTITUTIONAL: No weakness, fevers or chills  EYES/ENT: No visual changes;  No vertigo or throat pain   NECK: No pain or stiffness  RESPIRATORY: No cough, wheezing, hemoptysis; No shortness of breath  CARDIOVASCULAR: No chest pain or palpitations  GASTROINTESTINAL: No abdominal or epigastric pain. No nausea, vomiting, or hematemesis; No diarrhea or constipation. No melena or hematochezia.  GENITOURINARY: No dysuria, frequency or hematuria  NEUROLOGICAL: No numbness or weakness  SKIN: No itching, rashes    OBJECTIVE:    VITAL SIGNS:  ICU Vital Signs Last 24 Hrs  T(C): 37.1 (15 Mar 2023 07:15), Max: 37.3 (15 Mar 2023 02:52)  T(F): 98.8 (15 Mar 2023 07:15), Max: 99.1 (15 Mar 2023 02:52)  HR: 69 (15 Mar 2023 11:16) (66 - 109)  BP: 118/62 (15 Mar 2023 10:15) (73/50 - 215/191)  BP(mean): 77 (15 Mar 2023 10:15) (58 - 199)  ABP: --  ABP(mean): --  RR: 16 (15 Mar 2023 10:15) (16 - 26)  SpO2: 100% (15 Mar 2023 11:16) (96% - 100%)    O2 Parameters below as of 15 Mar 2023 06:55  Patient On (Oxygen Delivery Method): ventilator          Mode: AC/ CMV (Assist Control/ Continuous Mandatory Ventilation), RR (machine): 16, TV (machine): 400, FiO2: 40, PEEP: 5, ITime: 60, MAP: 8, PIP: 21    03-14 @ 07:  -  15 @ 07:00  --------------------------------------------------------  IN: 71 mL / OUT: 0 mL / NET: 71 mL    15 @ 07:01  -  03-15 @ 12:05  --------------------------------------------------------  IN: 225 mL / OUT: 0 mL / NET: 225 mL      CAPILLARY BLOOD GLUCOSE      POCT Blood Glucose.: 101 mg/dL (15 Mar 2023 05:51)      PHYSICAL EXAM:    General: NAD  HEENT: NC/AT; PERRL, clear conjunctiva  Neck: supple  Respiratory: CTA b/l  Cardiovascular: +S1/S2; RRR  Abdomen: soft, NT/ND; +BS x4  Extremities: WWP, 2+ peripheral pulses b/l; no LE edema  Skin: normal color and turgor; no rash  Neurological:    MEDICATIONS:  MEDICATIONS  (STANDING):  chlorhexidine 0.12% Liquid 15 milliLiter(s) Oral Mucosa every 12 hours  chlorhexidine 2% Cloths 1 Application(s) Topical <User Schedule>  levothyroxine Injectable 70 MICROGram(s) IV Push at bedtime  norepinephrine Infusion 0.05 MICROgram(s)/kG/Min (5.86 mL/Hr) IV Continuous <Continuous>  pantoprazole  Injectable 40 milliGRAM(s) IV Push daily  piperacillin/tazobactam IVPB.. 3.375 Gram(s) IV Intermittent every 8 hours  potassium phosphate IVPB 30 milliMole(s) IV Intermittent once  propofol Infusion 10 MICROgram(s)/kG/Min (3.75 mL/Hr) IV Continuous <Continuous>    MEDICATIONS  (PRN):      ALLERGIES:  Allergies    No Known Allergies    Intolerances        LABS:                        10.7   7.23  )-----------( 463      ( 15 Mar 2023 06:08 )             36.4     03-15    135  |  101  |  10  ----------------------------<  120<H>  5.0   |  22  |  0.50    Ca    9.7      15 Mar 2023 06:08  Phos  2.4     03-15  Mg     1.70     03-15    TPro  5.7<L>  /  Alb  2.4<L>  /  TBili  0.3  /  DBili  x   /  AST  17  /  ALT  6   /  AlkPhos  62  03-15      Urinalysis Basic - ( 14 Mar 2023 21:38 )    Color: Yellow / Appearance: Slightly Turbid / S.022 / pH: x  Gluc: x / Ketone: Negative  / Bili: Negative / Urobili: 3 mg/dL   Blood: x / Protein: Trace / Nitrite: Positive   Leuk Esterase: Moderate / RBC: 21 /HPF / WBC 34 /HPF   Sq Epi: x / Non Sq Epi: 1 /HPF / Bacteria: Many        RADIOLOGY & ADDITIONAL TESTS: Reviewed. INTERVAL HPI/OVERNIGHT EVENTS: RRT called for RRT called at 05:50 for hypoxemia.  Patient was having a sip of water as part of bedside dysphagia eval. She coughed once and then became completely unresponsive was intubated for airway protection.       HPI: 88-year-old female with past medical history of A-fib on Xarelto, GERD, hypothyroid, hypertension, hyperlipidemia, arthritis that causes increased difficulty with mobility presents complaining of shortness of breath for a few weeks and generalized weakness. Patient daughter reports that approximately 1 week ago was eating well without issues, then patient choked on a pill and after this she started to have increasing cough with foamy sputum without blood.  Patient saw her primary doctor Albertina Yi who performed a chest x-ray with concern for possible aspiration pneumonia and sent patient to the ER for further evaluation.  Daughter also notes patient has had foul-smelling urine.  Pt admitted to medicine for Aspiration PNA. Was on RA saturating 100%. RRT called 3/15 6AM after pt was given water to drink as part of dysphagia screen. On repeat exam pt was found to be gurgling on water, not protecting airway so intubated emergently.      SUBJECTIVE: Patient seen and examined at bedside.     ROS: Unable to assess as patient intubated/ sedated.     OBJECTIVE:    VITAL SIGNS:  ICU Vital Signs Last 24 Hrs  T(C): 37.1 (15 Mar 2023 07:15), Max: 37.3 (15 Mar 2023 02:52)  T(F): 98.8 (15 Mar 2023 07:15), Max: 99.1 (15 Mar 2023 02:52)  HR: 69 (15 Mar 2023 11:16) (66 - 109)  BP: 118/62 (15 Mar 2023 10:15) (73/50 - 215/191)  BP(mean): 77 (15 Mar 2023 10:15) (58 - 199)  ABP: --  ABP(mean): --  RR: 16 (15 Mar 2023 10:15) (16 - 26)  SpO2: 100% (15 Mar 2023 11:16) (96% - 100%)    O2 Parameters below as of 15 Mar 2023 06:55  Patient On (Oxygen Delivery Method): ventilator          Mode: AC/ CMV (Assist Control/ Continuous Mandatory Ventilation), RR (machine): 16, TV (machine): 400, FiO2: 40, PEEP: 5, ITime: 60, MAP: 8, PIP: 21    0314 @ 07:01  -  03-15 @ 07:00  --------------------------------------------------------  IN: 71 mL / OUT: 0 mL / NET: 71 mL    03-15 @ 07:01  -  03-15 @ 12:05  --------------------------------------------------------  IN: 225 mL / OUT: 0 mL / NET: 225 mL      CAPILLARY BLOOD GLUCOSE      POCT Blood Glucose.: 101 mg/dL (15 Mar 2023 05:51)      PHYSICAL EXAM:    General: NAD  HEENT: NC/AT; pupils pinpoint and minimally reactive, clear conjunctiva  Neck: supple  Respiratory: CTA b/l  Cardiovascular: +S1/S2; RRR  Abdomen: soft, NT/ND; +BS x4  Extremities: WWP, 2+ peripheral pulses b/l; no LE edema  Skin: normal color and turgor; no rash  Neurological: intubated/sedated    MEDICATIONS:  MEDICATIONS  (STANDING):  chlorhexidine 0.12% Liquid 15 milliLiter(s) Oral Mucosa every 12 hours  chlorhexidine 2% Cloths 1 Application(s) Topical <User Schedule>  levothyroxine Injectable 70 MICROGram(s) IV Push at bedtime  norepinephrine Infusion 0.05 MICROgram(s)/kG/Min (5.86 mL/Hr) IV Continuous <Continuous>  pantoprazole  Injectable 40 milliGRAM(s) IV Push daily  piperacillin/tazobactam IVPB.. 3.375 Gram(s) IV Intermittent every 8 hours  potassium phosphate IVPB 30 milliMole(s) IV Intermittent once  propofol Infusion 10 MICROgram(s)/kG/Min (3.75 mL/Hr) IV Continuous <Continuous>    MEDICATIONS  (PRN):      ALLERGIES:  Allergies    No Known Allergies    Intolerances        LABS:                        10.7   7.23  )-----------( 463      ( 15 Mar 2023 06:08 )             36.4     03-15    135  |  101  |  10  ----------------------------<  120<H>  5.0   |  22  |  0.50    Ca    9.7      15 Mar 2023 06:08  Phos  2.4     03-15  Mg     1.70     -15    TPro  5.7<L>  /  Alb  2.4<L>  /  TBili  0.3  /  DBili  x   /  AST  17  /  ALT  6   /  AlkPhos  62  03-15      Urinalysis Basic - ( 14 Mar 2023 21:38 )    Color: Yellow / Appearance: Slightly Turbid / S.022 / pH: x  Gluc: x / Ketone: Negative  / Bili: Negative / Urobili: 3 mg/dL   Blood: x / Protein: Trace / Nitrite: Positive   Leuk Esterase: Moderate / RBC: 21 /HPF / WBC 34 /HPF   Sq Epi: x / Non Sq Epi: 1 /HPF / Bacteria: Many        RADIOLOGY & ADDITIONAL TESTS: Reviewed.

## 2023-03-15 NOTE — PROGRESS NOTE ADULT - PROBLEM SELECTOR PLAN 2
Patient with foul smelling urine at home and urinary frequency, found to have positive UA   - no prior culture data available, will treat with ceftriaxone for coverage of aspiration pneumonia and UTI   - urine culture pending

## 2023-03-15 NOTE — CHART NOTE - NSCHARTNOTEFT_GEN_A_CORE
MICU Accept Note    CHIEF COMPLAINT: SOB    HPI / INTERVAL HISTORY: 88-year-old female with past medical history of A-fib on Xarelto, GERD, hypothyroid, hypertension, hyperlipidemia, arthritis that causes increased difficulty with mobility presents complaining of shortness of breath for a few weeks and generalized weakness. Patient daughter reports that approximately 1 week ago was eating well without issues, then patient choked on a pill and after this she started to have increasing cough with foamy sputum without blood.  Patient saw her primary doctor Albertina Yi who performed a chest x-ray with concern for possible aspiration pneumonia and sent patient to the ER for further evaluation.  Daughter also notes patient has had foul-smelling urine.  Pt admitted to medicine for Aspiration PNA.    RRT called 3/15 6AM after pt was given water to drink as part of dysphagia screen. On repeat exam pt was found to be gurgling on water, not protecting airway so intubated emergently.    PAST MEDICAL & SURGICAL HISTORY:  Afib      HTN (hypertension)      Hypothyroid      Arthritis      Home Medications:   · 	pantoprazole 40 mg oral delayed release tablet: Last Dose Taken:  , 1 tab(s) orally once a day  · 	Metoprolol Tartrate 50 mg oral tablet: Last Dose Taken:  , 1 tab(s) orally once a day  · 	levothyroxine 100 mcg (0.1 mg) oral tablet: Last Dose Taken:  , 1 tab(s) orally once a day  · 	Xarelto 20 mg oral tablet: Last Dose Taken:  , 1 tab(s) orally once a day (in the evening)    Allergies    No Known Allergies    Intolerances          REVIEW OF SYSTEMS:    [ ] All other systems negative  [X ] Unable to assess ROS because pt intubated    OBJECTIVE:  ICU Vital Signs Last 24 Hrs  T(C): 37 (15 Mar 2023 05:03), Max: 37.3 (15 Mar 2023 02:52)  T(F): 98.6 (15 Mar 2023 05:03), Max: 99.1 (15 Mar 2023 02:52)  HR: 87 (15 Mar 2023 05:03) (79 - 94)  BP: 132/54 (15 Mar 2023 05:03) (128/60 - 155/85)  BP(mean): --  ABP: --  ABP(mean): --  RR: 16 (15 Mar 2023 05:03) (16 - 21)  SpO2: 100% (15 Mar 2023 05:03) (96% - 100%)    O2 Parameters below as of 15 Mar 2023 05:03  Patient On (Oxygen Delivery Method): room air              CAPILLARY BLOOD GLUCOSE      POCT Blood Glucose.: 101 mg/dL (15 Mar 2023 05:51)      PHYSICAL EXAM:  GENERAL: intubated, sedated  EYES: EOMI, PERRL  ENT: EOMI, MMM, no oropharyngeal lesions or erythema appreciated  Pulm: +rhonchi throughout  CV: RRR, S1&S2+, no m/r/g appreciated  ABDOMEN: soft, nt, nd, no hepatosplenomegaly  MSK: nl ROM  EXTREMITIES:  no appreciable edema in b/l LE  Neuro: sedated  SKIN: warm and dry, no visible rash    LINES: ETT    HOSPITAL MEDICATIONS:  MEDICATIONS  (STANDING):  cefTRIAXone   IVPB 1000 milliGRAM(s) IV Intermittent every 24 hours  influenza  Vaccine (HIGH DOSE) 0.7 milliLiter(s) IntraMuscular once  levothyroxine 100 MICROGram(s) Oral daily  metoprolol tartrate 50 milliGRAM(s) Oral daily  pantoprazole    Tablet 40 milliGRAM(s) Oral before breakfast  rivaroxaban 20 milliGRAM(s) Oral with dinner  sodium chloride 0.9%. 1000 milliLiter(s) (80 mL/Hr) IV Continuous <Continuous>    MEDICATIONS  (PRN):      LABS:                        9.2    5.89  )-----------( 361      ( 14 Mar 2023 18:35 )             31.2     Hgb Trend: 9.2<--  03-14    142  |  100  |  13  ----------------------------<  126<H>  3.1<L>   |  32<H>  |  0.49<L>    Ca    10.3      14 Mar 2023 18:35    TPro  5.8<L>  /  Alb  3.0<L>  /  TBili  0.2  /  DBili  x   /  AST  11  /  ALT  <5  /  AlkPhos  71  03-14    Creatinine Trend: 0.49<--    Urinalysis Basic - ( 14 Mar 2023 21:38 )    Color: Yellow / Appearance: Slightly Turbid / S.022 / pH: x  Gluc: x / Ketone: Negative  / Bili: Negative / Urobili: 3 mg/dL   Blood: x / Protein: Trace / Nitrite: Positive   Leuk Esterase: Moderate / RBC: 21 /HPF / WBC 34 /HPF   Sq Epi: x / Non Sq Epi: 1 /HPF / Bacteria: Many            MICROBIOLOGY:   BCx pending    RADIOLOGY & ADDITIONAL TESTS:        A/P:  88-year-old female with past medical history of A-fib on Xarelto, GERD, hypothyroid, hypertension, hyperlipidemia, arthritis presenting with shortness of breath likely secondary to aspiration pneumonia.  Intubated 3/15 for airway protection.    #Neuro  -intubated, sedated on prop    #CV  //Vasoplegic shock  -ryan gtt after intubation, MAP goal>65    //Afib on xarelto  -c/w home xarelto  -hold home metoprolol given vasoplegic shock post-intubation. restart as able    #Pulm  //Aspiration PNA  -Intubated 3/15 for airway protection  -monitor ABG, adjust vent accordingly  -abx as below  -f/u post-intubation CXR    #GI  -TYRONE  -OGT for feeds  -c/w home PPI    #Renal/  -Cr 0.49  -monitor I/Os  -Zosyn for UTI    #Heme  -mild normocytic anemia Hb 9.2  -CTM H/H  -no evidence of bleed    #endocrine  //Hypothyroid  -c/w synthroid 100 po  -check tsh  -. Monitor off ISS    #ID  //Aspiration PNA  -c/w Zosyn empiric  -f/u BCx    //UTI  -UA nit, LE, many bacteria  -c/w Zosyn    #skin  -TYRONE    #DVT Ppx  -therapeutic AC for afib    #Ethics  -to be discussed with family.

## 2023-03-15 NOTE — H&P ADULT - NSHPLABSRESULTS_GEN_ALL_CORE
CBC Full  -  ( 14 Mar 2023 18:35 )  WBC Count : 5.89 K/uL  RBC Count : 3.71 M/uL  Hemoglobin : 9.2 g/dL  Hematocrit : 31.2 %  Platelet Count - Automated : 361 K/uL  Mean Cell Volume : 84.1 fL  Mean Cell Hemoglobin : 24.8 pg  Mean Cell Hemoglobin Concentration : 29.5 gm/dL  Auto Neutrophil # : 3.71 K/uL  Auto Lymphocyte # : 1.39 K/uL  Auto Monocyte # : 0.59 K/uL  Auto Eosinophil # : 0.14 K/uL  Auto Basophil # : 0.04 K/uL  Auto Neutrophil % : 63.0 %  Auto Lymphocyte % : 23.6 %  Auto Monocyte % : 10.0 %  Auto Eosinophil % : 2.4 %  Auto Basophil % : 0.7 % CBC Full  -  ( 14 Mar 2023 18:35 )  WBC Count : 5.89 K/uL  RBC Count : 3.71 M/uL  Hemoglobin : 9.2 g/dL  Hematocrit : 31.2 %  Platelet Count - Automated : 361 K/uL  Mean Cell Volume : 84.1 fL  Mean Cell Hemoglobin : 24.8 pg  Mean Cell Hemoglobin Concentration : 29.5 gm/dL  Auto Neutrophil # : 3.71 K/uL  Auto Lymphocyte # : 1.39 K/uL  Auto Monocyte # : 0.59 K/uL  Auto Eosinophil # : 0.14 K/uL  Auto Basophil # : 0.04 K/uL  Auto Neutrophil % : 63.0 %  Auto Lymphocyte % : 23.6 %  Auto Monocyte % : 10.0 %  Auto Eosinophil % : 2.4 %  Auto Basophil % : 0.7 %    EKG personally reviewed: NSR, no acute ischemic changes

## 2023-03-15 NOTE — CHART NOTE - NSCHARTNOTEFT_GEN_A_CORE
: Robin    INDICATION: Shock    PROCEDURE:  [X] LIMITED ECHO  [X] LIMITED CHEST  [ ] LIMITED RETROPERITONEAL  [ ] LIMITED ABDOMINAL  [ ] LIMITED DVT  [ ] NEEDLE GUIDANCE VASCULAR  [ ] NEEDLE GUIDANCE THORACENTESIS  [ ] NEEDLE GUIDANCE PARACENTESIS  [ ] NEEDLE GUIDANCE PERICARDIOCENTESIS  [ ] OTHER    FINDINGS: A line predominant anterolaterally. Moderately decreased LV systolic function. LVOT VTI 13 cm. RV smaller than LV in size. IVC 2 cm.      INTERPRETATION: Clear lungs. Moderately decreased LV systolic function.     Images stored on [employer].    Martin Kelly MD  Pulmonary & Critical Care

## 2023-03-15 NOTE — PROGRESS NOTE ADULT - ASSESSMENT
88-year-old female with past medical history of A-fib on Xarelto, GERD, hypothyroid, hypertension, hyperlipidemia, arthritis presenting with shortness of breath likely secondary to aspiration pneumonia.     88-year-old female with past medical history of A-fib on Xarelto, GERD, hypothyroid, hypertension, hyperlipidemia, arthritis presenting with shortness of breath likely secondary to aspiration pneumonia.  Intubated 3/15 for airway protection in setting of possible aspiration event.      #Neuro  -intubated, sedated on prop and versed- wead off sedation for possible SBT  - CTH 3/15: No acute intracranial hemorrhage, mass effect, or shift of the midline structures.    #CV  //Vasoplegic shock vs septic shock vs cardiogenic   -previously reported normal cardiac fxn as per cardiology  -required levophed and phenylephrine post intubation- phenylephrine titrated off now on levo 0.3  - POCUS: Moderately decreased LV systolic function. LVOT VTI 13 cm. RV smaller than LV in size. IVC 2 cm.  - TTE ordered  - Trend cardiac enzymes q 6 troponin 74->   - Cardiology following      //Afib on xarelto  -on home xarelto- holding for now  -hold home metoprolol given vasoplegic shock post-intubation.   - consider restarting xarelto vs hep gtt in am- SCDs for now      #Pulm  //?Aspiration PNA  -Intubated 3/15 for airway protection  - MV: 8/370/5/40- ABG 7.34/56/67/30/91.3 Lact 2.2  -abx as below  - CXR: clear lungs  - no sputum for cx     #GI  -TYRONE  -OGT for feeds- jevity ordered  -c/w home PPI  - nutrition consult placed    #Renal/  -Cr 0.49  -monitor I/Os: patient s/p porras insetion with minimal urine output  - will give 20 lasix x 1   - +UA  -Zosyn for UTI    #Heme  -mild normocytic anemia Hb 9.2  -CTM H/H  -no evidence of bleed  - SCDs for ppx  - consider restarting xarelto vs hep gtt in am     #endocrine  //Hypothyroid  -c/w synthroid 100 po  -TSH (h) 9.3 free T4 1.4  -. Monitor off ISS    #ID  //?Aspiration PNA  //+UA   -Blood cx, Urine Cx and MRSA swab pending   - Grossly + UA 3/15  -c/w Zosyn empiric (3/14- )   - no sputum for cx       #skin  -TYRONE    #DVT Ppx  -therapeutic AC for afib on xarelto at home  - holding A/C in setting of CTH to r/o bleed consider restarting xarelto vs hep gtt in am  - SCDs    #Ethics  -to be discussed with family.

## 2023-03-15 NOTE — PROGRESS NOTE ADULT - CRITICAL CARE ATTENDING COMMENT
Patient is an 87 yo F w/ A-fib on Xarelto, GERD, hypothyroid, HTN, HLD, arthritis who was initially admitted on 3/14 after presenting recent SOB and cough in setting of a recent aspiration event 2 weeks PTA. Patient initially admitted to medicine for further management of possible aspiration PNA and UTI with course c/b acute obtundation in setting of recurrent possible aspiration.     #Shock - Likely mixed in setting of sepsis and decreased cardiac function on POCUS  #ADHF  #Sepsis  #UTI  - c/w vasopressors to maintain MAP > 65, wean as tolerated  - Official TTE  - Check EKG and cardiac enzymes  - c/w empiric Zosyn  - f/u cultures  - Check TFTs    #Respiratory failure  - c/w mechanical ventilation support  - May need bronchoscopic evaluation if concern for foreign body. Aspirated pill?    #Encephalopathy - Unclear etiology. Acute onset as per RRT notes and family  - Urgent CTH given history of full AC for Afib and acute onset  - Wean sedation to monitor mental status  - Hold AC for now    Martin Kelly MD  Pulmonary & Critical Care

## 2023-03-15 NOTE — H&P ADULT - NSHPPHYSICALEXAM_GEN_ALL_CORE
Vital Signs Last 24 Hrs  T(C): 36.8 (14 Mar 2023 23:38), Max: 36.9 (14 Mar 2023 14:25)  T(F): 98.2 (14 Mar 2023 23:38), Max: 98.5 (14 Mar 2023 14:25)  HR: 79 (14 Mar 2023 23:38) (79 - 92)  BP: 133/66 (14 Mar 2023 23:38) (128/60 - 153/75)  BP(mean): --  RR: 20 (14 Mar 2023 23:38) (16 - 20)  SpO2: 98% (14 Mar 2023 23:38) (97% - 100%)    Parameters below as of 14 Mar 2023 23:38  Patient On (Oxygen Delivery Method): room air    CONSTITUTIONAL: NAD  EYES: PERRLA  NECK: Supple  RESPIRATORY: Lungs with crackles at the bases, unable to assess fully as difficult to reposition patient   CARDIOVASCULAR: Regular rate and rhythm, normal S1 and S2. Trace lower extremity edema   ABDOMEN: Nontender to palpation, no suprapubic tenderness   PSYCH: A+O to person, place, and time  NEUROLOGY: grossly intact

## 2023-03-15 NOTE — CONSULT NOTE ADULT - SUBJECTIVE AND OBJECTIVE BOX
C A R D I O L O G Y  *********************    DATE OF SERVICE: 03-15-23    HISTORY OF PRESENT ILLNESS: HPI:  88-year-old female with past medical history of A-fib on Xarelto, GERD, hypothyroid, hypertension, hyperlipidemia, arthritis that causes increased difficulty with mobility presents complaining of shortness of breath for a few weeks and generalized weakness. Patient daughter reports that approximately 1 week ago patient choked on a pill and after this she started to have increasing cough with foamy sputum without blood.  Patient saw her primary doctor Albertina Yi who performed a chest x-ray with concern for possible aspiration pneumonia and sent patient to the ER for further evaluation.  Daughter also notes patient has had foul-smelling urine.  Patient denies dysuria, hematuria, change in urination but notes that she frequently urinates. Patient denies fevers, chills, night sweats, chest pain, diarrhea (15 Mar 2023 00:07)      PAST MEDICAL & SURGICAL HISTORY:  Afib      HTN (hypertension)      Hypothyroid      Arthritis              MEDICATIONS:  MEDICATIONS  (STANDING):  chlorhexidine 0.12% Liquid 15 milliLiter(s) Oral Mucosa every 12 hours  chlorhexidine 2% Cloths 1 Application(s) Topical <User Schedule>  levothyroxine Injectable 70 MICROGram(s) IV Push at bedtime  midazolam Injectable 4 milliGRAM(s) IV Push once  norepinephrine Infusion 0.05 MICROgram(s)/kG/Min (5.86 mL/Hr) IV Continuous <Continuous>  pantoprazole  Injectable 40 milliGRAM(s) IV Push daily  piperacillin/tazobactam IVPB.. 3.375 Gram(s) IV Intermittent every 8 hours  potassium phosphate IVPB 30 milliMole(s) IV Intermittent once  propofol Infusion 10 MICROgram(s)/kG/Min (3.75 mL/Hr) IV Continuous <Continuous>      Allergies    No Known Allergies    Intolerances        FAMILY HISTORY:    Non-contributary for premature coronary disease or sudden cardiac death    SOCIAL HISTORY:    [ ] Non-smoker  [ ] Smoker  [ ] Alcohol    FLU VACCINE THIS YEAR STARTS IN AUGUST:  [ ] Yes    [ ] No    IF OVER 65 HAVE YOU EVER HAD A PNA VACCINE:  [ ] Yes    [ ] No       [ ] N/A      REVIEW OF SYSTEMS:  [ ]chest pain  [  ]shortness of breath  [  ]palpitations  [  ]syncope  [ ]near syncope [ ]upper extremity weakness   [ ] lower extremity weakness  [  ]diplopia  [  ]altered mental status   [  ]fevers  [ ]chills [ ]nausea  [ ]vomiting  [  ]dysphagia    [ ]abdominal pain  [ ]melena  [ ]BRBPR    [  ]epistaxis  [  ]rash    [ ]lower extremity edema        [X] All others negative	  [ ] Unable to obtain      LABS:	 	    CARDIAC MARKERS:                              10.7   7.23  )-----------( 463      ( 15 Mar 2023 06:08 )             36.4     Hb Trend: 10.7<--, 9.2<--    03-15    135  |  101  |  10  ----------------------------<  120<H>  5.0   |  22  |  0.50    Ca    9.7      15 Mar 2023 06:08  Phos  2.4     03-15  Mg     1.70     03-15    TPro  5.7<L>  /  Alb  2.4<L>  /  TBili  0.3  /  DBili  x   /  AST  17  /  ALT  6   /  AlkPhos  62  03-15    Creatinine Trend: 0.50<--, 0.49<--    Coags:      proBNP:   Lipid Profile:   HgA1c:   TSH:         PHYSICAL EXAM:  T(C): 37.1 (03-15-23 @ 07:15), Max: 37.3 (03-15-23 @ 02:52)  HR: 71 (03-15-23 @ 10:15) (66 - 109)  BP: 118/62 (03-15-23 @ 10:15) (73/50 - 215/191)  RR: 16 (03-15-23 @ 10:15) (16 - 26)  SpO2: 100% (03-15-23 @ 10:15) (96% - 100%)  Wt(kg): --   BMI (kg/m2): 26 (03-15-23 @ 03:27)  I&O's Summary    14 Mar 2023 07:01  -  15 Mar 2023 07:00  --------------------------------------------------------  IN: 71 mL / OUT: 0 mL / NET: 71 mL    15 Mar 2023 07:01  -  15 Mar 2023 10:51  --------------------------------------------------------  IN: 225 mL / OUT: 0 mL / NET: 225 mL        Gen: Appears well in NAD  HEENT:  (-)icterus (-)pallor  CV: N S1 S2 1/6 SUNDEEP (+)2 Pulses B/l  Resp:  Clear to auscultation B/L, normal effort  GI: (+) BS Soft, NT, ND  Lymph:  (-)Edema, (-)obvious lymphadenopathy  Skin: Warm to touch, Normal turgor  Psych: Appropriate mood and affect      TELEMETRY: 	      ECG:  	    RADIOLOGY:         CXR:     ASSESSMENT/PLAN: 	88y Female     C A R D I O L O G Y  *********************    DATE OF SERVICE: 03-15-23    HISTORY OF PRESENT ILLNESS: HPI:  Pt is a 88-year-old female with a pmh of A-fib on Xarelto, GERD, hypothyroid, hypertension, hyperlipidemia, arthritis who inially presented with complaints of shortness of breath for a few weeks and generalized weakness.     Patient daughter reports that approximately 1 week ago patient choked on a pill and after this she started to have increasing cough with foamy sputum without blood.  Patient saw her primary doctor Albertina Yi who performed a chest x-ray with concern for possible aspiration pneumonia and sent patient to the ER for further evaluation.  Daughter also notes patient has had foul-smelling urine.  She was admitted for Aspiration PNA, had rapid response called after patient was given water to drink as part of dysphagia screen found to be gurgling and then subsequently became unresponsive. After intubation patient hypotensive requiring 800 mcg of phenylephrine. She was started on a Propofol drip and phenylephrine drip which was increased to maximum then small amount of levo was added. Currently intubated and sedated and on pressors.    PAST MEDICAL & SURGICAL HISTORY:  Afib  HTN  Hypothyroid  Arthritis      MEDICATIONS:  MEDICATIONS  (STANDING):  chlorhexidine 0.12% Liquid 15 milliLiter(s) Oral Mucosa every 12 hours  chlorhexidine 2% Cloths 1 Application(s) Topical <User Schedule>  levothyroxine Injectable 70 MICROGram(s) IV Push at bedtime  midazolam Injectable 4 milliGRAM(s) IV Push once  norepinephrine Infusion 0.05 MICROgram(s)/kG/Min (5.86 mL/Hr) IV Continuous <Continuous>  pantoprazole  Injectable 40 milliGRAM(s) IV Push daily  piperacillin/tazobactam IVPB.. 3.375 Gram(s) IV Intermittent every 8 hours  potassium phosphate IVPB 30 milliMole(s) IV Intermittent once  propofol Infusion 10 MICROgram(s)/kG/Min (3.75 mL/Hr) IV Continuous <Continuous>    Allergies: No Known Allergies    FAMILY HISTORY:  Non-contributary for premature coronary disease or sudden cardiac death    SOCIAL HISTORY:    [X ] Non-smoker  [ ] Smoker  [ ] Alcohol    FLU VACCINE THIS YEAR STARTS IN AUGUST:  [ ] Yes    [ ] No    IF OVER 65 HAVE YOU EVER HAD A PNA VACCINE:  [ ] Yes    [ ] No       [ ] N/A      REVIEW OF SYSTEMS:  [ ]chest pain  [  ]shortness of breath  [  ]palpitations  [  ]syncope  [ ]near syncope [ ]upper extremity weakness   [ ] lower extremity weakness  [  ]diplopia  [  ]altered mental status   [  ]fevers  [ ]chills [ ]nausea  [ ]vomiting  [  ]dysphagia    [ ]abdominal pain  [ ]melena  [ ]BRBPR    [  ]epistaxis  [  ]rash    [ ]lower extremity edema    [ ] All others negative	  [X] Unable to obtain    LABS:	 	    CARDIAC MARKERS:                        10.7   7.23  )-----------( 463      ( 15 Mar 2023 06:08 )             36.4     Hb Trend: 10.7<--, 9.2<--    03-15    135  |  101  |  10  ----------------------------<  120<H>  5.0   |  22  |  0.50    Ca    9.7      15 Mar 2023 06:08  Phos  2.4     03-15  Mg     1.70     03-15    TPro  5.7<L>  /  Alb  2.4<L>  /  TBili  0.3  /  DBili  x   /  AST  17  /  ALT  6   /  AlkPhos  62  03-15    Creatinine Trend: 0.50<--, 0.49<--    PHYSICAL EXAM:  T(C): 37.1 (03-15-23 @ 07:15), Max: 37.3 (03-15-23 @ 02:52)  HR: 71 (03-15-23 @ 10:15) (66 - 109)  BP: 118/62 (03-15-23 @ 10:15) (73/50 - 215/191)  RR: 16 (03-15-23 @ 10:15) (16 - 26)  SpO2: 100% (03-15-23 @ 10:15) (96% - 100%)  Wt(kg): --   BMI (kg/m2): 26 (03-15-23 @ 03:27)  I&O's Summary    14 Mar 2023 07:01  -  15 Mar 2023 07:00  --------------------------------------------------------  IN: 71 mL / OUT: 0 mL / NET: 71 mL    15 Mar 2023 07:01  -  15 Mar 2023 10:51  --------------------------------------------------------  IN: 225 mL / OUT: 0 mL / NET: 225 mL      General: Intubated and sedated  Head: Normocephalic and atraumatic.   Neck: No JVD. No bruits. Supple. Does not appear to be enlarged.   Cardiovascular: + S1,S2 ; RRR Soft systolic murmur at the left lower sternal border. No rubs noted.    Lungs: coarse respirator sounds  Abdomen: + BS, soft. Non tender. Non distended. No rebound. No guarding.   Extremities: No clubbing/cyanosis/edema.   Neurologic: deferred  Skin: Warm   Psychiatric: deferred  Musculoskeletal: deferred      TELEMETRY: NSR	      ECG:  	NSR with LVH    RADIOLOGY:  CXR:  The heart size is not well evaluated on this projection.  Small left effusion is present.    Opacity seen through the cardiac silhouette to the left of the midline   may represent hiatal hernia.    Lungs are free of focal abnormalities.  No pneumothorax.       TTE 09/2022:  1. Normal left ventricular size and systolic function. Moderate diastolic dysfunction.  2. Normal left atrial size  3. Normal right atrial size.  4. Normal right ventricular size and function.  5. Structurally normal trileaflet aortic valve with no regurgitation noted.  6. Mitral valve is normal in mobility and thickness.  7. Normal appearing tricuspid valve with mild tricuspid  regurgitation.  8. Structurally normal pulmonic valve with no regurgitation  noted.  9. No evidence of significant pericardial effusion.  10. The aortic root is normal.  11. Normal pulmonary artery.  12. IVC is normal with respiratory variation    ASSESSMENT/PLAN: Pt is a 88-year-old female with a pmh of A-fib on Xarelto, GERD, hypothyroid, hypertension, hyperlipidemia, arthritis who inially presented with complaints of shortness of breath for a few weeks and generalized weakness.  She was admitted for Aspiration PNA, had rapid response called after patient was given water to drink as part of dysphagia screen found to be gurgling and then subsequently became unresponsive. After intubation patient hypotensive requiring 800 mcg of phenylephrine. She was started on a Propofol drip and phenylephrine drip which was increased to maximum then small amount of levo was added. Currently intubated and sedated and on pressors.      1. Shock  - likely septic  - wean pressors as tolerated  - previous normal LVEF on echo, noted decreased UOP this am  - can give fluids as needed as previously CAD no significant valvular disease and LVEF was preserved  - check repeat echo    2. pafib   - in sinus   - can consider heparin gtt    Emma Bliss MD  Pager: [phone]

## 2023-03-15 NOTE — H&P ADULT - PROBLEM SELECTOR PLAN 3
Patient with history of atrial fibrillation, on home metoprolol and xarelto   - patient currently in NSR but will continue with home medications

## 2023-03-15 NOTE — H&P ADULT - PROBLEM SELECTOR PLAN 2
Patient with foul smelling urine at home and urinary frequency, found to have positive UA   - no prior culture data available, will treat with zosyn for coverage of aspiration pneumonia and UTI   - urine culture pending Patient with foul smelling urine at home and urinary frequency, found to have positive UA   - no prior culture data available, will treat with ceftriaxone for coverage of aspiration pneumonia and UTI   - urine culture pending

## 2023-03-15 NOTE — RAPID RESPONSE TEAM SUMMARY - NSADDTLFINDINGSRRT_GEN_ALL_CORE
Prior to my arrival: The patient was being bagged by RT  On arrival; Vital Signs were  T: afebrile  BP: 120/80  HR: 88  RR: 15  SpO2: 88 on BVM  B    Brief exam:  Gen: ill appearing  Card: irregularly irregular  Pulm: CTABL  Abd: SNTND no RG  Neuro: A&O x0  Ext/Vasc: WWP, 2+ pulses, no edema      Patient was intubated for complete unresponsiveness, failure to protect her airway. After intubation patient hypotensive requiring 800 mcg of phenylephrine. She was started on a Propofol drip when she started to wake up. And she ended up being started on phenylephrine drip which was increased to maximum then small amount of levo was added. She was transferred to CCU under MICU.

## 2023-03-15 NOTE — H&P ADULT - HISTORY OF PRESENT ILLNESS
88-year-old female with past medical history of A-fib on Xarelto, GERD, hypothyroid, hypertension, hyperlipidemia, arthritis that causes increased difficulty with mobility presents to the ER complaining of shortness of breath x1 week generalized fatigue, generalized weakness and intermittent episodes of nausea and vomiting last time she vomited was 3 days ago.  Patient daughter reports that approximately 1 week ago patient choked on a pill and after this she started to have increasing cough with foamy sputum nonbloody no hemoptysis.  Patient saw her primary doctor Albertina Yi who performed a chest x-ray with concern for possible aspiration pneumonia.  And sent patient to the ER for further evaluation.  Daughter also notes patient has had foul-smelling urine.  Patient denies dysuria, hematuria, change in urination but notes that she frequently urinates at night.  Daughter also notes patient complains of intermittent palpitations which could be associated with her A-fib.  Patient currently taking metoprolol.  Patient denies fevers, chills, night sweats, chest pain, diarrhea 88-year-old female with past medical history of A-fib on Xarelto, GERD, hypothyroid, hypertension, hyperlipidemia, arthritis that causes increased difficulty with mobility presents complaining of shortness of breath for a few weeks and generalized weakness. Patient daughter reports that approximately 1 week ago patient choked on a pill and after this she started to have increasing cough with foamy sputum without blood.  Patient saw her primary doctor Albertina Yi who performed a chest x-ray with concern for possible aspiration pneumonia and sent patient to the ER for further evaluation.  Daughter also notes patient has had foul-smelling urine.  Patient denies dysuria, hematuria, change in urination but notes that she frequently urinates. Patient denies fevers, chills, night sweats, chest pain, diarrhea

## 2023-03-15 NOTE — RAPID RESPONSE TEAM SUMMARY - NSSITUATIONBACKGROUNDRRT_GEN_ALL_CORE
RRT called at 05:50 for hypoxemia  88 year old Woman with PMH atrial fibrillation, hypothyroidism arthritis presented with the shortness of breath and fatigue and the episodes of dysphagia and concern for aspiration pneumonia. With a UA concerning for UTI.      Events leading up to RRT:  Patient was having a sip of water as part of bedside dysphagia eval. She coughed once and then became completely unresponsive.

## 2023-03-15 NOTE — H&P ADULT - ASSESSMENT
88-year-old female with past medical history of A-fib on Xarelto, GERD, hypothyroid, hypertension, hyperlipidemia, arthritis presenting with shortness of breath       that causes increased difficulty with mobility presents complaining of shortness of breath for a few weeks and generalized weakness. Patient daughter reports that approximately 1 week ago patient choked on a pill and after this she started to have increasing cough with foamy sputum without blood.  Patient saw her primary doctor Albertina Yi who performed a chest x-ray with concern for possible aspiration pneumonia and sent patient to the ER for further evaluation.  Daughter also notes patient has had foul-smelling urine.  Patient denies dysuria, hematuria, change in urination but notes that she frequently urinates. Patient denies fevers, chills, night sweats, chest pain, diarrhea 88-year-old female with past medical history of A-fib on Xarelto, GERD, hypothyroid, hypertension, hyperlipidemia, arthritis presenting with shortness of breath likely secondary to aspiration pneumonia.         88-year-old female with past medical history of A-fib on Xarelto, GERD, hypothyroid, hypertension, hyperlipidemia, arthritis presenting with shortness of breath likely secondary to aspiration pneumonia.   88-year-old female with past medical history of A-fib on Xarelto, GERD, hypothyroid, hypertension, hyperlipidemia, arthritis presenting with shortness of breath likely secondary to aspiration pneumonia.

## 2023-03-15 NOTE — MEDICAL STUDENT ADULT H&P (EDUCATION) - NS MD HP STUD PE VITALS FT
Vital Signs Last 24 Hrs  T(C): 36.8 (14 Mar 2023 23:38), Max: 36.9 (14 Mar 2023 14:25)  T(F): 98.2 (14 Mar 2023 23:38), Max: 98.5 (14 Mar 2023 14:25)  HR: 79 (14 Mar 2023 23:38) (79 - 92)  BP: 133/66 (14 Mar 2023 23:38) (128/60 - 153/75)  BP(mean): --  RR: 20 (14 Mar 2023 23:38) (16 - 20)  SpO2: 98% (14 Mar 2023 23:38) (97% - 100%)    Parameters below as of 14 Mar 2023 23:38  Patient On (Oxygen Delivery Method): room air

## 2023-03-15 NOTE — MEDICAL STUDENT ADULT H&P (EDUCATION) - NS MD HP STUD PE CONSITUTIONAL FT
PHYSICAL EXAM:    GENERAL: NAD  HEENT:  Atraumatic  CHEST/LUNG: Chest rise equal bilaterally  HEART: Regular rate and rhythm  ABDOMEN: Soft, Nontender, Nondistended  EXTREMITIES:  Extremities warm  PSYCH: A&Ox3  SKIN: No obvious rashes or lesions  MSK: No cervical spine TTP, able to range neck to the left and right/ No midline spinal TTP/ No swelling, redness, pain or discharge from   NEUROLOGY: strength and sensation intact in all extremities. CN 2 - 12 intact. Finger to nose test intact. No pronator drift. Ambulatory without difficulty.

## 2023-03-15 NOTE — H&P ADULT - ATTENDING COMMENTS
88-year-old female with past medical history of A-fib on Xarelto, GERD, hypothyroid, hypertension, hyperlipidemia, arthritis presenting with complaints of cough and shortness of breath. [place]  [place] [id number] used. Pt complains of cough for past 1 month and vomiting x1 week when she eats. Pt denies any difficulty swallowing, reports some nausea. Denies constipation, last BM yesterday and this morning with abd soft/nontender on exam. Pt afebrile and w/o leukocytosis. CXR w/ left lower lobe opacity. Concern for aspiration pneumonia vs chemical pneumonitis.     #Aspiration pna vs chemical pneumonitis  - Pt w/ cough, some vomiting after eating. CXR with left lower lobe opacity  - will continue IV ceftriaxone for now as also being treated for UTI  - aspiration precautions  - S&S eval  - consider GI evaluation     #UTI  - Pt reports foul smelling urine and urinary frequency, positive U/A  - c/w IV ceftriaxone  - f/u urine cx

## 2023-03-15 NOTE — ED ADULT NURSE REASSESSMENT NOTE - NS ED NURSE REASSESS COMMENT FT1
Pt is resting well in bed. Not in any distress. VSS. Pt is on RA O2sats fine. No complaints.
Pt resting well in bed, VSS, no complaints. Pt is going upstairs to room.
Received report from CLAIRE RN, pt is AXOX4, not in any distress, pt is resting well in bed.
Pt denies complaints at this time, expect for 4/10 pain in her arms from her arthritis. Pt does not appear in acute distress at this time. Will continue to monitor.

## 2023-03-15 NOTE — H&P ADULT - PROBLEM SELECTOR PLAN 1
Patient presenting with shortness of breath after a choking episode at home   - CXR with left basilar opacity, patient also with outpatient CXR suggestive of aspiration pneumonia   - will continue with zosyn   - blood cultures pending   - aspiration precautions   - NPO, will order dysphagia screen and bedside S+S Patient presenting with shortness of breath after a choking episode at home   - CXR with left basilar opacity, patient also with outpatient CXR suggestive of aspiration pneumonia   - will treat with ceftriaxone   - blood cultures pending   - aspiration precautions   - NPO, will order dysphagia screen and bedside S+S

## 2023-03-15 NOTE — PROGRESS NOTE ADULT - PROBLEM SELECTOR PLAN 1
Patient presenting with shortness of breath after a choking episode at home   - CXR with left basilar opacity, patient also with outpatient CXR suggestive of aspiration pneumonia   - will treat with ceftriaxone   - blood cultures pending   - aspiration precautions   - NPO, will order dysphagia screen and bedside S+S

## 2023-03-15 NOTE — PATIENT PROFILE ADULT - FALL HARM RISK - HARM RISK INTERVENTIONS

## 2023-03-16 LAB
A1C WITH ESTIMATED AVERAGE GLUCOSE RESULT: 5 % — SIGNIFICANT CHANGE UP (ref 4–5.6)
ANION GAP SERPL CALC-SCNC: 14 MMOL/L — SIGNIFICANT CHANGE UP (ref 7–14)
ANION GAP SERPL CALC-SCNC: 15 MMOL/L — HIGH (ref 7–14)
ANION GAP SERPL CALC-SCNC: 15 MMOL/L — HIGH (ref 7–14)
APTT BLD: 61.9 SEC — HIGH (ref 27–36.3)
APTT BLD: 64.4 SEC — HIGH (ref 27–36.3)
BLD GP AB SCN SERPL QL: NEGATIVE — SIGNIFICANT CHANGE UP
BLOOD GAS VENOUS COMPREHENSIVE RESULT: SIGNIFICANT CHANGE UP
BUN SERPL-MCNC: 19 MG/DL — SIGNIFICANT CHANGE UP (ref 7–23)
BUN SERPL-MCNC: 20 MG/DL — SIGNIFICANT CHANGE UP (ref 7–23)
BUN SERPL-MCNC: 21 MG/DL — SIGNIFICANT CHANGE UP (ref 7–23)
CALCIUM SERPL-MCNC: 9.1 MG/DL — SIGNIFICANT CHANGE UP (ref 8.4–10.5)
CALCIUM SERPL-MCNC: 9.2 MG/DL — SIGNIFICANT CHANGE UP (ref 8.4–10.5)
CALCIUM SERPL-MCNC: 9.2 MG/DL — SIGNIFICANT CHANGE UP (ref 8.4–10.5)
CHLORIDE SERPL-SCNC: 100 MMOL/L — SIGNIFICANT CHANGE UP (ref 98–107)
CHLORIDE SERPL-SCNC: 101 MMOL/L — SIGNIFICANT CHANGE UP (ref 98–107)
CHLORIDE SERPL-SCNC: 102 MMOL/L — SIGNIFICANT CHANGE UP (ref 98–107)
CK MB BLD-MCNC: 8 % — HIGH (ref 0–2.5)
CK MB CFR SERPL CALC: 1.6 NG/ML — SIGNIFICANT CHANGE UP
CK MB CFR SERPL CALC: 2.2 NG/ML — SIGNIFICANT CHANGE UP
CK SERPL-CCNC: 20 U/L — LOW (ref 25–170)
CK SERPL-CCNC: 29 U/L — SIGNIFICANT CHANGE UP (ref 25–170)
CO2 SERPL-SCNC: 24 MMOL/L — SIGNIFICANT CHANGE UP (ref 22–31)
CO2 SERPL-SCNC: 24 MMOL/L — SIGNIFICANT CHANGE UP (ref 22–31)
CO2 SERPL-SCNC: 27 MMOL/L — SIGNIFICANT CHANGE UP (ref 22–31)
CREAT SERPL-MCNC: 1.47 MG/DL — HIGH (ref 0.5–1.3)
CREAT SERPL-MCNC: 1.5 MG/DL — HIGH (ref 0.5–1.3)
CREAT SERPL-MCNC: 1.52 MG/DL — HIGH (ref 0.5–1.3)
EGFR: 33 ML/MIN/1.73M2 — LOW
EGFR: 33 ML/MIN/1.73M2 — LOW
EGFR: 34 ML/MIN/1.73M2 — LOW
ESTIMATED AVERAGE GLUCOSE: 97 — SIGNIFICANT CHANGE UP
GLUCOSE SERPL-MCNC: 148 MG/DL — HIGH (ref 70–99)
GLUCOSE SERPL-MCNC: 159 MG/DL — HIGH (ref 70–99)
GLUCOSE SERPL-MCNC: 165 MG/DL — HIGH (ref 70–99)
GRAM STN FLD: SIGNIFICANT CHANGE UP
HCT VFR BLD CALC: 33.5 % — LOW (ref 34.5–45)
HGB BLD-MCNC: 10.2 G/DL — LOW (ref 11.5–15.5)
MAGNESIUM SERPL-MCNC: 1.9 MG/DL — SIGNIFICANT CHANGE UP (ref 1.6–2.6)
MAGNESIUM SERPL-MCNC: 2 MG/DL — SIGNIFICANT CHANGE UP (ref 1.6–2.6)
MAGNESIUM SERPL-MCNC: 2.1 MG/DL — SIGNIFICANT CHANGE UP (ref 1.6–2.6)
MCHC RBC-ENTMCNC: 25.5 PG — LOW (ref 27–34)
MCHC RBC-ENTMCNC: 30.4 GM/DL — LOW (ref 32–36)
MCV RBC AUTO: 83.8 FL — SIGNIFICANT CHANGE UP (ref 80–100)
MRSA PCR RESULT.: SIGNIFICANT CHANGE UP
NRBC # BLD: 0 /100 WBCS — SIGNIFICANT CHANGE UP (ref 0–0)
NRBC # FLD: 0 K/UL — SIGNIFICANT CHANGE UP (ref 0–0)
PHOSPHATE SERPL-MCNC: 5.5 MG/DL — HIGH (ref 2.5–4.5)
PHOSPHATE SERPL-MCNC: 5.9 MG/DL — HIGH (ref 2.5–4.5)
PHOSPHATE SERPL-MCNC: 6 MG/DL — HIGH (ref 2.5–4.5)
PLATELET # BLD AUTO: 506 K/UL — HIGH (ref 150–400)
POTASSIUM SERPL-MCNC: 3.5 MMOL/L — SIGNIFICANT CHANGE UP (ref 3.5–5.3)
POTASSIUM SERPL-MCNC: 4.1 MMOL/L — SIGNIFICANT CHANGE UP (ref 3.5–5.3)
POTASSIUM SERPL-MCNC: 4.1 MMOL/L — SIGNIFICANT CHANGE UP (ref 3.5–5.3)
POTASSIUM SERPL-SCNC: 3.5 MMOL/L — SIGNIFICANT CHANGE UP (ref 3.5–5.3)
POTASSIUM SERPL-SCNC: 4.1 MMOL/L — SIGNIFICANT CHANGE UP (ref 3.5–5.3)
POTASSIUM SERPL-SCNC: 4.1 MMOL/L — SIGNIFICANT CHANGE UP (ref 3.5–5.3)
RBC # BLD: 4 M/UL — SIGNIFICANT CHANGE UP (ref 3.8–5.2)
RBC # FLD: 15.5 % — HIGH (ref 10.3–14.5)
RH IG SCN BLD-IMP: POSITIVE — SIGNIFICANT CHANGE UP
S AUREUS DNA NOSE QL NAA+PROBE: SIGNIFICANT CHANGE UP
SODIUM SERPL-SCNC: 138 MMOL/L — SIGNIFICANT CHANGE UP (ref 135–145)
SODIUM SERPL-SCNC: 141 MMOL/L — SIGNIFICANT CHANGE UP (ref 135–145)
SODIUM SERPL-SCNC: 143 MMOL/L — SIGNIFICANT CHANGE UP (ref 135–145)
SPECIMEN SOURCE: SIGNIFICANT CHANGE UP
TROPONIN T, HIGH SENSITIVITY RESULT: 56 NG/L — CRITICAL HIGH
TROPONIN T, HIGH SENSITIVITY RESULT: 61 NG/L — CRITICAL HIGH
TROPONIN T, HIGH SENSITIVITY RESULT: 67 NG/L — CRITICAL HIGH
WBC # BLD: 16.36 K/UL — HIGH (ref 3.8–10.5)
WBC # FLD AUTO: 16.36 K/UL — HIGH (ref 3.8–10.5)

## 2023-03-16 PROCEDURE — 71045 X-RAY EXAM CHEST 1 VIEW: CPT | Mod: 26

## 2023-03-16 PROCEDURE — 99291 CRITICAL CARE FIRST HOUR: CPT

## 2023-03-16 RX ORDER — DEXMEDETOMIDINE HYDROCHLORIDE IN 0.9% SODIUM CHLORIDE 4 UG/ML
0.2 INJECTION INTRAVENOUS
Qty: 400 | Refills: 0 | Status: DISCONTINUED | OUTPATIENT
Start: 2023-03-16 | End: 2023-03-17

## 2023-03-16 RX ORDER — BUMETANIDE 0.25 MG/ML
2 INJECTION INTRAMUSCULAR; INTRAVENOUS ONCE
Refills: 0 | Status: COMPLETED | OUTPATIENT
Start: 2023-03-16 | End: 2023-03-16

## 2023-03-16 RX ORDER — POTASSIUM CHLORIDE 20 MEQ
10 PACKET (EA) ORAL
Refills: 0 | Status: COMPLETED | OUTPATIENT
Start: 2023-03-16 | End: 2023-03-16

## 2023-03-16 RX ORDER — PROPOFOL 10 MG/ML
10 INJECTION, EMULSION INTRAVENOUS
Qty: 1000 | Refills: 0 | Status: DISCONTINUED | OUTPATIENT
Start: 2023-03-16 | End: 2023-03-17

## 2023-03-16 RX ORDER — ASPIRIN/CALCIUM CARB/MAGNESIUM 324 MG
81 TABLET ORAL DAILY
Refills: 0 | Status: DISCONTINUED | OUTPATIENT
Start: 2023-03-16 | End: 2023-03-16

## 2023-03-16 RX ORDER — MAGNESIUM SULFATE 500 MG/ML
1 VIAL (ML) INJECTION ONCE
Refills: 0 | Status: COMPLETED | OUTPATIENT
Start: 2023-03-16 | End: 2023-03-16

## 2023-03-16 RX ADMIN — Medication 5.86 MICROGRAM(S)/KG/MIN: at 11:53

## 2023-03-16 RX ADMIN — Medication 100 MILLIEQUIVALENT(S): at 11:54

## 2023-03-16 RX ADMIN — CHLORHEXIDINE GLUCONATE 1 APPLICATION(S): 213 SOLUTION TOPICAL at 05:09

## 2023-03-16 RX ADMIN — PIPERACILLIN AND TAZOBACTAM 25 GRAM(S): 4; .5 INJECTION, POWDER, LYOPHILIZED, FOR SOLUTION INTRAVENOUS at 05:08

## 2023-03-16 RX ADMIN — CHLORHEXIDINE GLUCONATE 15 MILLILITER(S): 213 SOLUTION TOPICAL at 11:55

## 2023-03-16 RX ADMIN — Medication 100 GRAM(S): at 17:02

## 2023-03-16 RX ADMIN — Medication 100 MILLIEQUIVALENT(S): at 16:07

## 2023-03-16 RX ADMIN — BUMETANIDE 2 MILLIGRAM(S): 0.25 INJECTION INTRAMUSCULAR; INTRAVENOUS at 12:55

## 2023-03-16 RX ADMIN — HEPARIN SODIUM 1100 UNIT(S)/HR: 5000 INJECTION INTRAVENOUS; SUBCUTANEOUS at 11:53

## 2023-03-16 RX ADMIN — PROPOFOL 3.75 MICROGRAM(S)/KG/MIN: 10 INJECTION, EMULSION INTRAVENOUS at 00:49

## 2023-03-16 RX ADMIN — Medication 70 MICROGRAM(S): at 21:51

## 2023-03-16 RX ADMIN — Medication 5.86 MICROGRAM(S)/KG/MIN: at 19:39

## 2023-03-16 RX ADMIN — CHLORHEXIDINE GLUCONATE 15 MILLILITER(S): 213 SOLUTION TOPICAL at 21:52

## 2023-03-16 RX ADMIN — PANTOPRAZOLE SODIUM 40 MILLIGRAM(S): 20 TABLET, DELAYED RELEASE ORAL at 11:54

## 2023-03-16 RX ADMIN — Medication 100 MILLIEQUIVALENT(S): at 14:05

## 2023-03-16 RX ADMIN — HEPARIN SODIUM 1100 UNIT(S)/HR: 5000 INJECTION INTRAVENOUS; SUBCUTANEOUS at 04:14

## 2023-03-16 RX ADMIN — PROPOFOL 3.75 MICROGRAM(S)/KG/MIN: 10 INJECTION, EMULSION INTRAVENOUS at 19:39

## 2023-03-16 RX ADMIN — DEXMEDETOMIDINE HYDROCHLORIDE IN 0.9% SODIUM CHLORIDE 3.13 MICROGRAM(S)/KG/HR: 4 INJECTION INTRAVENOUS at 19:39

## 2023-03-16 RX ADMIN — Medication 100 MILLIEQUIVALENT(S): at 15:06

## 2023-03-16 RX ADMIN — PIPERACILLIN AND TAZOBACTAM 25 GRAM(S): 4; .5 INJECTION, POWDER, LYOPHILIZED, FOR SOLUTION INTRAVENOUS at 17:03

## 2023-03-16 NOTE — DIETITIAN INITIAL EVALUATION ADULT - PROBLEM/PLAN-3
----- Message from Lavern Hicks DO sent at 3/23/2021  9:09 AM CDT -----  Please notify of ultrasound findings and have her schedule in anatomy ultrasound and visit with me in about 4 weeks.  I do not know if she can provide an LMP to try to correlate with due date, it appears that she thought that she knew how far along she was when she called but I do not see any LMP listed.  We want to make sure we date the pregnancy appropriately.   DISPLAY PLAN FREE TEXT

## 2023-03-16 NOTE — DIETITIAN INITIAL EVALUATION ADULT - NSFNSGIIOFT_GEN_A_CORE
03-15-23 @ 07:01  -  03-16-23 @ 07:00  --------------------------------------------------------  OUT:  Total OUT: 0 mL    Total NET: 120 mL      03-16-23 @ 07:01  -  03-16-23 @ 08:47  --------------------------------------------------------  OUT:  Total OUT: 0 mL    Total NET: 60 mL

## 2023-03-16 NOTE — DIETITIAN INITIAL EVALUATION ADULT - PERTINENT LABORATORY DATA
03-16    138  |  100  |  21  ----------------------------<  159<H>  4.1   |  24  |  1.52<H>    Ca    9.2      16 Mar 2023 06:32  Phos  6.0     03-16  Mg     2.00     03-16    TPro  5.7<L>  /  Alb  2.4<L>  /  TBili  0.3  /  DBili  x   /  AST  17  /  ALT  6   /  AlkPhos  62  03-15    Glucose, Serum: 148 mg/dL (03.16.23 @ 02:35)  Glucose, Serum: 156 mg/dL (03.15.23 @ 17:36)

## 2023-03-16 NOTE — PROGRESS NOTE ADULT - ASSESSMENT
88-year-old female with past medical history of A-fib on Xarelto, GERD, hypothyroid, hypertension, hyperlipidemia, arthritis presenting with shortness of breath likely secondary to aspiration pneumonia.  Intubated 3/15 for airway protection in setting of possible aspiration event.      #Neuro  -intubated, sedated on prop and versed- weaned off sedation for possible SBT  - CTH 3/15: No acute intracranial hemorrhage, mass effect, or shift of the midline structures.    #CV  //Vasoplegic shock vs septic shock vs cardiogenic   -previously reported normal cardiac fxn as per cardiology  -required levophed and phenylephrine post intubation- phenylephrine titrated off now on levo 0.3  - POCUS: Moderately decreased LV systolic function. LVOT VTI 13 cm. RV smaller than LV in size. IVC 2 cm.  - TTE from 3/15 showed EF of 40% with grossly moderate global left ventricular systolic dysfunction.  - Trops downtrended x2  - s/p 324mg aspirin -> continue 81mg?  - Cardiology following      //Afib on xarelto  -on home xarelto- holding for now  -hold home metoprolol given vasoplegic shock post-intubation.   -currently on hep gtt       #Pulm  //?Aspiration PNA  -Intubated 3/15 for airway protection  - MV: 8/370/5/40- ABG 7.34/56/67/30/91.3 Lact 2.2  -abx as below  - CXR: clear lungs  - no sputum for cx     #GI  -TYRONE  -OGT for feeds- jevity ordered  -c/w PPI  - nutrition consult placed    #Renal/  -Cr 0.49  -monitor I/Os: patient s/p porras insetion with minimal urine output  - s/p 20 lasix x 1 and bumex 2mg with minimal urine output  -+UA with E coli growing in urine  -Zosyn for UTI    #Heme  -mild normocytic anemia Hb 9.2  -CTM H/H  -no evidence of bleed  -on heparin gtt    #endocrine  //Hypothyroid  -c/w synthroid 70 po  -TSH (h) 9.3 free T4 1.4  -. Monitor off ISS    #ID  //?Aspiration PNA  //+UA   -Blood cx, Urine Cx and MRSA swab pending   - Grossly + UA 3/15 and urine cx  - c/w Zosyn empiric (3/14- )   - no sputum for cx     #skin  -TYRONE    #DVT Ppx  -therapeutic AC for afib on xarelto at home  -currently on heparin gtt    #Ethics  -to be discussed with family. 88-year-old female with past medical history of A-fib on Xarelto, GERD, hypothyroid, hypertension, hyperlipidemia, arthritis presenting with shortness of breath likely secondary to aspiration pneumonia.  Intubated 3/15 for airway protection in setting of possible aspiration event.      #Neuro  -intubated, sedated on prop and versed- weaned off sedation for possible SBT  - CTH 3/15: No acute intracranial hemorrhage, mass effect, or shift of the midline structures.    #CV  //Vasoplegic shock vs septic shock vs cardiogenic   -previously reported normal cardiac fxn as per cardiology  -required levophed and phenylephrine post intubation- phenylephrine titrated off now on levo 0.3  - POCUS: Moderately decreased LV systolic function. LVOT VTI 13 cm. RV smaller than LV in size. IVC 2 cm.  - TTE from 3/15 showed EF of 40% with grossly moderate global left ventricular systolic dysfunction.  - Trops downtrended x2  - s/p 324mg aspirin -> continue 81mg  - bumex 2mg x1  - Cardiology following      //Afib on xarelto  -on home xarelto- holding for now  -hold home metoprolol given vasoplegic shock post-intubation.   -currently on hep gtt       #Pulm  //?Aspiration PNA  -Intubated 3/15 for airway protection  - MV: 8/370/5/40- ABG 7.34/56/67/30/91.3 Lact 2.2  -abx as below  - CXR: clear lungs  - f/u sputum cx    #GI  -TYRONE  -OGT for feeds- jevity ordered  -c/w PPI  - nutrition consult placed    #Renal/  -Cr 0.49  -monitor I/Os: patient s/p porras insetion with minimal urine output  - s/p 20 lasix x 1 and bumex 2mg with minimal urine output  -+UA with E coli growing in urine  -Zosyn for UTI    #Heme  -mild normocytic anemia Hb 9.2  -CTM H/H  -no evidence of bleed  -on heparin gtt    #endocrine  //Hypothyroid  -c/w synthroid 70 po  -TSH (h) 9.3 free T4 1.4  -. Monitor off ISS    #ID  //?Aspiration PNA  //+UA   -Blood cx, Urine Cx and MRSA swab pending   - Grossly + UA 3/15 and urine cx  - c/w Zosyn empiric (3/14- )   - no sputum for cx     #skin  -TYRONE    #DVT Ppx  -therapeutic AC for afib on xarelto at home  -currently on heparin gtt    #Ethics  -to be discussed with family. 88-year-old female with past medical history of A-fib on Xarelto, GERD, hypothyroid, hypertension, hyperlipidemia, arthritis presenting with shortness of breath likely secondary to aspiration pneumonia.  Intubated 3/15 for airway protection in setting of possible aspiration event.      #Neuro  -intubated, sedated on prop and versed- weaned off sedation for possible SBT  - CTH 3/15: No acute intracranial hemorrhage, mass effect, or shift of the midline structures.    #CV  //Vasoplegic shock vs septic shock vs cardiogenic   -previously reported normal cardiac fxn as per cardiology  -required levophed and phenylephrine post intubation- phenylephrine titrated off now on levo 0.3  - POCUS: Moderately decreased LV systolic function. LVOT VTI 13 cm. RV smaller than LV in size. IVC 2 cm.  - TTE from 3/15 showed EF of 40% with grossly moderate global left ventricular systolic dysfunction.  - Trops downtrended x2  - s/p 324mg aspirin - no need to continue per cards  - bumex 2mg x2 on 3/15 and 3/16  - Cardiology following      //Afib on xarelto  -on home xarelto- holding for now  -hold home metoprolol given vasoplegic shock post-intubation.   -currently on hep gtt       #Pulm  //?Aspiration PNA  -Intubated 3/15 for airway protection  -abx as below  - CXR: clear lungs  - f/u sputum cx    #GI  -TYRONE  -OGT for feeds- jevity ordered  -c/w PPI  - nutrition consult placed    #Renal/  -Cr 0.49  -monitor I/Os: patient s/p porras insetion with minimal urine output  - s/p 20 lasix x 1 and bumex 2mg with minimal urine output  -+UA with E coli growing in urine  -Zosyn for UTI    #Heme  -mild normocytic anemia   -CTM H/H  -no evidence of bleed  -on heparin gtt for A-fib    #endocrine  //Hypothyroid  -c/w synthroid 70 po  -TSH (h) 9.3 free T4 1.4  -. Monitor off ISS    #ID  //?Aspiration PNA  - f//u sputum for cx   - c/w Zosyn empiric (3/14- )   //+UA   -Blood cx, Urine Cx and MRSA swab pending   - Grossly + UA 3/15 and urine cx  - s/p CTX      #skin  -TYRONE    #DVT Ppx  -therapeutic AC for afib on xarelto at home  -currently on heparin gtt for A fib    #Ethics  -full code

## 2023-03-16 NOTE — PROGRESS NOTE ADULT - SUBJECTIVE AND OBJECTIVE BOX
INTERVAL HPI/OVERNIGHT EVENTS: transferred to MICU from CCU    SUBJECTIVE: Patient seen and examined at bedside. Intubated, sedated, ROS cannot be obtained.     VITAL SIGNS:  ICU Vital Signs Last 24 Hrs  T(C): 36 (16 Mar 2023 04:00), Max: 36.3 (15 Mar 2023 20:00)  T(F): 96.8 (16 Mar 2023 04:00), Max: 97.3 (15 Mar 2023 20:00)  HR: 61 (16 Mar 2023 07:) (54 - 102)  BP: 136/49 (16 Mar 2023 07:00) (73/50 - 164/81)  BP(mean): 75 (16 Mar 2023 07:) (58 - 104)  ABP: --  ABP(mean): --  RR: 12 (16 Mar 2023 07:00) (0 - 27)  SpO2: 100% (16 Mar 2023 07:) (100% - 100%)    O2 Parameters below as of 16 Mar 2023 08:00  Patient On (Oxygen Delivery Method): ventilator    O2 Concentration (%): 40      Mode: AC/ CMV (Assist Control/ Continuous Mandatory Ventilation), RR (machine): 12, TV (machine): 370, FiO2: 40, PEEP: 5, ITime: 0.86, MAP: 8, PIP: 18  Plateau pressure:   P/F ratio:     15 @ 07:  -  16 @ 07:00  --------------------------------------------------------  IN: 1871.7 mL / OUT: 232 mL / NET: 1639.7 mL     @ 07:01  -  16 @ 07:31  --------------------------------------------------------  IN: 74 mL / OUT: 0 mL / NET: 74 mL      CAPILLARY BLOOD GLUCOSE      POCT Blood Glucose.: 101 mg/dL (15 Mar 2023 05:51)    ECG:    PHYSICAL EXAM:  General: NAD  HEENT: NC/AT; pupils pinpoint and minimally reactive, clear conjunctiva  Neck: supple  Respiratory: CTA b/l  Cardiovascular: +S1/S2; RRR  Abdomen: soft, NT/ND; +BS x4  Extremities: WWP, 2+ peripheral pulses b/l; no LE edema  Skin: normal color and turgor; no rash  Neurological: intubated/sedated      MEDICATIONS:  MEDICATIONS  (STANDING):  chlorhexidine 0.12% Liquid 15 milliLiter(s) Oral Mucosa every 12 hours  chlorhexidine 2% Cloths 1 Application(s) Topical <User Schedule>  dexMEDEtomidine Infusion 0.2 MICROgram(s)/kG/Hr (3.13 mL/Hr) IV Continuous <Continuous>  heparin  Infusion.  Unit(s)/Hr (11 mL/Hr) IV Continuous <Continuous>  levothyroxine Injectable 70 MICROGram(s) IV Push at bedtime  norepinephrine Infusion 0.05 MICROgram(s)/kG/Min (5.86 mL/Hr) IV Continuous <Continuous>  pantoprazole  Injectable 40 milliGRAM(s) IV Push daily  piperacillin/tazobactam IVPB.. 3.375 Gram(s) IV Intermittent every 12 hours  propofol Infusion 10 MICROgram(s)/kG/Min (3.75 mL/Hr) IV Continuous <Continuous>    MEDICATIONS  (PRN):  fentaNYL    Injectable 50 MICROGram(s) IV Push every 2 hours PRN vent desynchrony  heparin   Injectable 5000 Unit(s) IV Push every 6 hours PRN For aPTT less than 40  heparin   Injectable 2500 Unit(s) IV Push every 6 hours PRN For aPTT between 40 - 57      ALLERGIES:  Allergies    No Known Allergies    Intolerances        LABS:                        10.2   16.36 )-----------( 506      ( 16 Mar 2023 02:35 )             33.5     03-16    138  |  100  |  21  ----------------------------<  159<H>  4.1   |  24  |  1.52<H>    Ca    9.2      16 Mar 2023 06:32  Phos  6.0     03-16  Mg     2.00     03-16    TPro  5.7<L>  /  Alb  2.4<L>  /  TBili  0.3  /  DBili  x   /  AST  17  /  ALT  6   /  AlkPhos  62  03-15    PT/INR - ( 15 Mar 2023 20:10 )   PT: 22.5 sec;   INR: 1.93 ratio         PTT - ( 16 Mar 2023 03:46 )  PTT:61.9 sec  Urinalysis Basic - ( 14 Mar 2023 21:38 )    Color: Yellow / Appearance: Slightly Turbid / S.022 / pH: x  Gluc: x / Ketone: Negative  / Bili: Negative / Urobili: 3 mg/dL   Blood: x / Protein: Trace / Nitrite: Positive   Leuk Esterase: Moderate / RBC: 21 /HPF / WBC 34 /HPF   Sq Epi: x / Non Sq Epi: 1 /HPF / Bacteria: Many        RADIOLOGY & ADDITIONAL TESTS: Reviewed.

## 2023-03-16 NOTE — DIETITIAN INITIAL EVALUATION ADULT - OTHER INFO
Pt. remains intubated/sedated @ time of RDN encounter. Spoke w son-in-law @ bedside.   Pt. reported w food allergy to [pimiento] peppers.     Explained role of enteral nutrition to family member.  Pt. without any noted/reported intolerance to TF @ this time (no vomiting/diarrhea/constipation or abdominal distention).

## 2023-03-16 NOTE — DIETITIAN INITIAL EVALUATION ADULT - NS FNS DIET ORDER
Diet, NPO with Tube Feed:   Tube Feeding Modality: Orogastric  Jevity 1.2 Sridhar (JEVITY1.2RTH)  Total Volume for 24 Hours (mL): 720  Continuous  Starting Tube Feed Rate {mL per Hour}: 10  Increase Tube Feed Rate by (mL): 10     Every 4 hours  Until Goal Tube Feed Rate (mL per Hour): 30  Tube Feed Duration (in Hours): 24  Tube Feed Start Time: 17:20 (03-15-23 @ 17:10)    provides:  864 kcals & 40g protein

## 2023-03-16 NOTE — DIETITIAN INITIAL EVALUATION ADULT - REASON FOR ADMISSION
89yo F admitted for aspiration PNA, s/p RRT (3/15) gurgling on water during dysphagia screen & not protecting airway, hence emergently intubated.

## 2023-03-16 NOTE — DIETITIAN INITIAL EVALUATION ADULT - ORAL INTAKE PTA/DIET HISTORY
Decreased PO intake  x ~ 1week PTA and vomiting after eating.  Consumed regular consistency food/liquids at home. Enjoyed eating pasta.

## 2023-03-16 NOTE — DIETITIAN INITIAL EVALUATION ADULT - PERTINENT MEDS FT
MEDICATIONS  (STANDING):  chlorhexidine 0.12% Liquid 15 milliLiter(s) Oral Mucosa every 12 hours  chlorhexidine 2% Cloths 1 Application(s) Topical <User Schedule>  dexMEDEtomidine Infusion 0.2 MICROgram(s)/kG/Hr (3.13 mL/Hr) IV Continuous <Continuous>  heparin  Infusion.  Unit(s)/Hr (11 mL/Hr) IV Continuous <Continuous>  levothyroxine Injectable 70 MICROGram(s) IV Push at bedtime  norepinephrine Infusion 0.05 MICROgram(s)/kG/Min (5.86 mL/Hr) IV Continuous <Continuous>  pantoprazole  Injectable 40 milliGRAM(s) IV Push daily  piperacillin/tazobactam IVPB.. 3.375 Gram(s) IV Intermittent every 12 hours  propofol Infusion 10 MICROgram(s)/kG/Min (3.75 mL/Hr) IV Continuous <Continuous>    MEDICATIONS  (PRN):  fentaNYL    Injectable 50 MICROGram(s) IV Push every 2 hours PRN vent desynchrony  heparin   Injectable 5000 Unit(s) IV Push every 6 hours PRN For aPTT less than 40  heparin   Injectable 2500 Unit(s) IV Push every 6 hours PRN For aPTT between 40 - 57

## 2023-03-16 NOTE — PROGRESS NOTE ADULT - SUBJECTIVE AND OBJECTIVE BOX
Date of service 3/16/23    chief complaint: SOB    extended hpi: 88-year-old female with a pmh of A-fib on Xarelto, GERD, hypothyroid, hypertension, hyperlipidemia, arthritis who inially presented with complaints of shortness of breath for a few weeks and generalized weakness. She was admitted for Aspiration PNA, had rapid response called after patient was given water to drink as part of dysphagia screen found to be gurgling and then subsequently became unresponsive. After intubation patient hypotensive requiring pressors.    remains intubated, sedated on pressors in MICU    Review of Systems:   Constitutional: [ ] fevers, [ ] chills.   Skin: [ ] dry skin. [ ] rashes.  Psychiatric: [ ] depression, [ ] anxiety.   Gastrointestinal: [ ] BRBPR, [ ] melena.   Neurological: [ ] confusion. [ ] seizures. [ ] shuffling gait.   Ears,Nose,Mouth and Throat: [ ] ear pain [ ] sore throat.   Eyes: [ ] diplopia.   Respiratory: [ ] hemoptysis. [ ] shortness of breath  Cardiovascular: See HPI above  Hematologic/Lymphatic: [ ] anemia. [ ] painful nodes. [ ] prolonged bleeding.   Genitourinary: [ ] hematuria. [ ] flank pain.   Endocrine: [ ] significant change in weight. [ ] intolerance to heat and cold.     Review of systems [ ] otherwise negative, [x ] otherwise unable to obtain    FH: no family history of sudden cardiac death in first degree relatives    SH: [ ] tobacco, [ ] alcohol, [ ] drugs    chlorhexidine 0.12% Liquid 15 milliLiter(s) Oral Mucosa every 12 hours  chlorhexidine 2% Cloths 1 Application(s) Topical <User Schedule>  dexMEDEtomidine Infusion 0.2 MICROgram(s)/kG/Hr IV Continuous <Continuous>  fentaNYL    Injectable 50 MICROGram(s) IV Push every 2 hours PRN  heparin   Injectable 5000 Unit(s) IV Push every 6 hours PRN  heparin   Injectable 2500 Unit(s) IV Push every 6 hours PRN  heparin  Infusion.  Unit(s)/Hr IV Continuous <Continuous>  levothyroxine Injectable 70 MICROGram(s) IV Push at bedtime  magnesium sulfate  IVPB 1 Gram(s) IV Intermittent once  norepinephrine Infusion 0.05 MICROgram(s)/kG/Min IV Continuous <Continuous>  pantoprazole  Injectable 40 milliGRAM(s) IV Push daily  piperacillin/tazobactam IVPB.. 3.375 Gram(s) IV Intermittent every 12 hours  potassium chloride  10 mEq/100 mL IVPB 10 milliEquivalent(s) IV Intermittent every 1 hour  propofol Infusion 10 MICROgram(s)/kG/Min IV Continuous <Continuous>                            10.2   16.36 )-----------( 506      ( 16 Mar 2023 02:35 )             33.5     143  |  101  |  19  ----------------------------<  165<H>  3.5   |  27  |  1.47<H>    Ca    9.1      16 Mar 2023 12:01  Phos  5.5     03-16  Mg     1.90     03-16    TPro  5.7<L>  /  Alb  2.4<L>  /  TBili  0.3  /  DBili  x   /  AST  17  /  ALT  6   /  AlkPhos  62  03-15      CARDIAC MARKERS ( 16 Mar 2023 12:01 )  x     / x     / 20 U/L / x     / 1.6 ng/mL  CARDIAC MARKERS ( 16 Mar 2023 06:32 )  x     / x     / x     / x     / 2.2 ng/mL  CARDIAC MARKERS ( 16 Mar 2023 02:35 )  x     / x     / 29 U/L / x     / x      CARDIAC MARKERS ( 15 Mar 2023 17:36 )  x     / x     / 26 U/L / x     / 2.3 ng/mL  CARDIAC MARKERS ( 15 Mar 2023 11:00 )  x     / x     / 30 U/L / x     / 1.6 ng/mL  TROP T 67, 61, 56      T(C): 36.1 (03-16-23 @ 12:00), Max: 36.6 (03-16-23 @ 08:00)  HR: 73 (03-16-23 @ 15:00) (54 - 102)  BP: 116/53 (03-16-23 @ 15:00) (99/57 - 164/81)  RR: 22 (03-16-23 @ 15:00) (0 - 27)  SpO2: 100% (03-16-23 @ 15:00) (100% - 100%)  Wt(kg): --    General: intubated and sedated  Head: Normocephalic and atraumatic.   Neck: No JVD. No bruits. Supple. Does not appear to be enlarged.   Cardiovascular: + S1,S2 ; RRR Soft systolic murmur at the left lower sternal border. No rubs noted.    Lungs: coarse BS BL  Abdomen: + BS, soft. Non tender. Non distended. No rebound. No guarding.   Extremities: No clubbing/cyanosis/edema.   Neurologic: unable to assess  Skin: Warm and moist. The patient's skin has normal elasticity and good skin turgor.   Psychiatric: unable to assess  Musculoskeletal: unable to assess    DATA    tele- SR ST      < from: Transthoracic Echocardiogram (03.15.23 @ 13:49) >  CONCLUSIONS:  1. Mitral annular calcification, otherwise normal mitral  valve. Mild-moderate mitral regurgitation.  2. Calcified trileaflet aortic valve with normal opening.  Mild aortic regurgitation.  3. Normal left ventricular internal dimensions and wall  thicknesses.  4. Endocardium not well visualized; grossly moderate global  left ventricular systolic dysfunction.  5. Mild diastolic dysfunction (Stage I).  6. The right ventricle is not well visualized; grossly  normal right ventricular systolic function.  ------------------------------------------------------------------------  Confirmed on  3/15/2023 - 15:05:45 by Kaveh Jimenez M.D.,  [facility], [facility]  -----------------------    < end of copied text >    ASSESSMENT/PLAN: Pt is a 88-year-old female with a pmh of A-fib on Xarelto, GERD, hypothyroid, hypertension, hyperlipidemia, arthritis who inially presented with complaints of shortness of breath for a few weeks and generalized weakness.  She was admitted for Aspiration PNA, had rapid response called after patient was given water to drink as part of dysphagia screen found to be gurgling and then subsequently became unresponsive. After intubation patient hypotensive requiring pressor support.      1. Shock  - likely septic due to asp PNA and +UA  - Pressors and Vent per MICU  - previous normal LVEF on echo, now with LVEF 40%--suspect due to septic shock  -repeat echo next week to re eval    2. PAF  - in sinus   -continue Heparin Gtt while Xarelto on hold    3. Elevated Trop T  --in the setting of PANCHITO, sepsis  --not c/w ACS  --repeat Echo next week to re eval LV function    Thank you for allowing us to participate in the care of our mutual patient.  Please do not hesitate to call with any questions.

## 2023-03-16 NOTE — DIETITIAN INITIAL EVALUATION ADULT - ENTERAL
-- D/C Jevity 1.2  --Initiate Glucerna 1.5 @ 25mL/hr then increase by 10mL in 4hrs, as tolerated, to goal of 35mL/hr    provides:  840mL total volume  1260 kcals (+ Propofol kcals)  69g protein  638mL free water

## 2023-03-17 LAB
-  AMIKACIN: SIGNIFICANT CHANGE UP
-  AMOXICILLIN/CLAVULANIC ACID: SIGNIFICANT CHANGE UP
-  AMPICILLIN/SULBACTAM: SIGNIFICANT CHANGE UP
-  AMPICILLIN: SIGNIFICANT CHANGE UP
-  AZTREONAM: SIGNIFICANT CHANGE UP
-  CEFAZOLIN: SIGNIFICANT CHANGE UP
-  CEFEPIME: SIGNIFICANT CHANGE UP
-  CEFOXITIN: SIGNIFICANT CHANGE UP
-  CEFTRIAXONE: SIGNIFICANT CHANGE UP
-  CEFUROXIME: SIGNIFICANT CHANGE UP
-  CIPROFLOXACIN: SIGNIFICANT CHANGE UP
-  ERTAPENEM: SIGNIFICANT CHANGE UP
-  GENTAMICIN: SIGNIFICANT CHANGE UP
-  IMIPENEM: SIGNIFICANT CHANGE UP
-  LEVOFLOXACIN: SIGNIFICANT CHANGE UP
-  MEROPENEM: SIGNIFICANT CHANGE UP
-  NITROFURANTOIN: SIGNIFICANT CHANGE UP
-  PIPERACILLIN/TAZOBACTAM: SIGNIFICANT CHANGE UP
-  TOBRAMYCIN: SIGNIFICANT CHANGE UP
-  TRIMETHOPRIM/SULFAMETHOXAZOLE: SIGNIFICANT CHANGE UP
ALBUMIN SERPL ELPH-MCNC: 3 G/DL — LOW (ref 3.3–5)
ALP SERPL-CCNC: 71 U/L — SIGNIFICANT CHANGE UP (ref 40–120)
ALT FLD-CCNC: 6 U/L — SIGNIFICANT CHANGE UP (ref 4–33)
ANION GAP SERPL CALC-SCNC: 12 MMOL/L — SIGNIFICANT CHANGE UP (ref 7–14)
ANION GAP SERPL CALC-SCNC: 12 MMOL/L — SIGNIFICANT CHANGE UP (ref 7–14)
APTT BLD: 76.9 SEC — HIGH (ref 27–36.3)
APTT BLD: 82.2 SEC — HIGH (ref 27–36.3)
AST SERPL-CCNC: 15 U/L — SIGNIFICANT CHANGE UP (ref 4–32)
BASE EXCESS BLDV CALC-SCNC: 7.5 MMOL/L — HIGH (ref -2–3)
BILIRUB DIRECT SERPL-MCNC: <0.2 MG/DL — SIGNIFICANT CHANGE UP (ref 0–0.3)
BILIRUB INDIRECT FLD-MCNC: >0.2 MG/DL — SIGNIFICANT CHANGE UP (ref 0–1)
BILIRUB SERPL-MCNC: 0.4 MG/DL — SIGNIFICANT CHANGE UP (ref 0.2–1.2)
BLOOD GAS VENOUS COMPREHENSIVE RESULT: SIGNIFICANT CHANGE UP
BUN SERPL-MCNC: 16 MG/DL — SIGNIFICANT CHANGE UP (ref 7–23)
BUN SERPL-MCNC: 18 MG/DL — SIGNIFICANT CHANGE UP (ref 7–23)
CALCIUM SERPL-MCNC: 9 MG/DL — SIGNIFICANT CHANGE UP (ref 8.4–10.5)
CALCIUM SERPL-MCNC: 9.7 MG/DL — SIGNIFICANT CHANGE UP (ref 8.4–10.5)
CHLORIDE BLDV-SCNC: 99 MMOL/L — SIGNIFICANT CHANGE UP (ref 96–108)
CHLORIDE SERPL-SCNC: 97 MMOL/L — LOW (ref 98–107)
CHLORIDE SERPL-SCNC: 98 MMOL/L — SIGNIFICANT CHANGE UP (ref 98–107)
CK SERPL-CCNC: 12 U/L — LOW (ref 25–170)
CK SERPL-CCNC: 48 U/L — SIGNIFICANT CHANGE UP (ref 25–170)
CO2 BLDV-SCNC: 34.1 MMOL/L — HIGH (ref 22–26)
CO2 SERPL-SCNC: 29 MMOL/L — SIGNIFICANT CHANGE UP (ref 22–31)
CO2 SERPL-SCNC: 31 MMOL/L — SIGNIFICANT CHANGE UP (ref 22–31)
CREAT SERPL-MCNC: 1.03 MG/DL — SIGNIFICANT CHANGE UP (ref 0.5–1.3)
CREAT SERPL-MCNC: 1.27 MG/DL — SIGNIFICANT CHANGE UP (ref 0.5–1.3)
CULTURE RESULTS: SIGNIFICANT CHANGE UP
EGFR: 41 ML/MIN/1.73M2 — LOW
EGFR: 52 ML/MIN/1.73M2 — LOW
GAS PNL BLDV: 136 MMOL/L — SIGNIFICANT CHANGE UP (ref 136–145)
GLUCOSE BLDC GLUCOMTR-MCNC: 97 MG/DL — SIGNIFICANT CHANGE UP (ref 70–99)
GLUCOSE BLDV-MCNC: 182 MG/DL — HIGH (ref 70–99)
GLUCOSE SERPL-MCNC: 107 MG/DL — HIGH (ref 70–99)
GLUCOSE SERPL-MCNC: 207 MG/DL — HIGH (ref 70–99)
HCO3 BLDV-SCNC: 33 MMOL/L — HIGH (ref 22–29)
HCT VFR BLD CALC: 31 % — LOW (ref 34.5–45)
HCT VFR BLD CALC: 32.1 % — LOW (ref 34.5–45)
HCT VFR BLDA CALC: 30 % — LOW (ref 34.5–46.5)
HGB BLD CALC-MCNC: 10 G/DL — LOW (ref 11.7–16.1)
HGB BLD-MCNC: 9.4 G/DL — LOW (ref 11.5–15.5)
HGB BLD-MCNC: 9.7 G/DL — LOW (ref 11.5–15.5)
INR BLD: 1.37 RATIO — HIGH (ref 0.88–1.16)
LACTATE BLDV-MCNC: 1.4 MMOL/L — SIGNIFICANT CHANGE UP (ref 0.5–2)
MAGNESIUM SERPL-MCNC: 1.8 MG/DL — SIGNIFICANT CHANGE UP (ref 1.6–2.6)
MAGNESIUM SERPL-MCNC: 2 MG/DL — SIGNIFICANT CHANGE UP (ref 1.6–2.6)
MCHC RBC-ENTMCNC: 25.3 PG — LOW (ref 27–34)
MCHC RBC-ENTMCNC: 25.5 PG — LOW (ref 27–34)
MCHC RBC-ENTMCNC: 30.2 GM/DL — LOW (ref 32–36)
MCHC RBC-ENTMCNC: 30.3 GM/DL — LOW (ref 32–36)
MCV RBC AUTO: 83.6 FL — SIGNIFICANT CHANGE UP (ref 80–100)
MCV RBC AUTO: 84 FL — SIGNIFICANT CHANGE UP (ref 80–100)
METHOD TYPE: SIGNIFICANT CHANGE UP
NRBC # BLD: 0 /100 WBCS — SIGNIFICANT CHANGE UP (ref 0–0)
NRBC # BLD: 0 /100 WBCS — SIGNIFICANT CHANGE UP (ref 0–0)
NRBC # FLD: 0 K/UL — SIGNIFICANT CHANGE UP (ref 0–0)
NRBC # FLD: 0 K/UL — SIGNIFICANT CHANGE UP (ref 0–0)
ORGANISM # SPEC MICROSCOPIC CNT: SIGNIFICANT CHANGE UP
ORGANISM # SPEC MICROSCOPIC CNT: SIGNIFICANT CHANGE UP
PCO2 BLDV: 48 MMHG — SIGNIFICANT CHANGE UP (ref 39–52)
PH BLDV: 7.44 — HIGH (ref 7.32–7.43)
PHOSPHATE SERPL-MCNC: 2.9 MG/DL — SIGNIFICANT CHANGE UP (ref 2.5–4.5)
PHOSPHATE SERPL-MCNC: 3.9 MG/DL — SIGNIFICANT CHANGE UP (ref 2.5–4.5)
PLATELET # BLD AUTO: 395 K/UL — SIGNIFICANT CHANGE UP (ref 150–400)
PLATELET # BLD AUTO: 448 K/UL — HIGH (ref 150–400)
PO2 BLDV: 63 MMHG — HIGH (ref 25–45)
POTASSIUM BLDV-SCNC: 3.5 MMOL/L — SIGNIFICANT CHANGE UP (ref 3.5–5.1)
POTASSIUM SERPL-MCNC: 3.6 MMOL/L — SIGNIFICANT CHANGE UP (ref 3.5–5.3)
POTASSIUM SERPL-MCNC: 3.9 MMOL/L — SIGNIFICANT CHANGE UP (ref 3.5–5.3)
POTASSIUM SERPL-SCNC: 3.6 MMOL/L — SIGNIFICANT CHANGE UP (ref 3.5–5.3)
POTASSIUM SERPL-SCNC: 3.9 MMOL/L — SIGNIFICANT CHANGE UP (ref 3.5–5.3)
PROT SERPL-MCNC: 6.6 G/DL — SIGNIFICANT CHANGE UP (ref 6–8.3)
PROTHROM AB SERPL-ACNC: 16 SEC — HIGH (ref 10.5–13.4)
RBC # BLD: 3.69 M/UL — LOW (ref 3.8–5.2)
RBC # BLD: 3.84 M/UL — SIGNIFICANT CHANGE UP (ref 3.8–5.2)
RBC # FLD: 15.7 % — HIGH (ref 10.3–14.5)
RBC # FLD: 15.8 % — HIGH (ref 10.3–14.5)
SAO2 % BLDV: 93.2 % — HIGH (ref 67–88)
SODIUM SERPL-SCNC: 138 MMOL/L — SIGNIFICANT CHANGE UP (ref 135–145)
SODIUM SERPL-SCNC: 141 MMOL/L — SIGNIFICANT CHANGE UP (ref 135–145)
SPECIMEN SOURCE: SIGNIFICANT CHANGE UP
WBC # BLD: 10.25 K/UL — SIGNIFICANT CHANGE UP (ref 3.8–10.5)
WBC # BLD: 10.68 K/UL — HIGH (ref 3.8–10.5)
WBC # FLD AUTO: 10.25 K/UL — SIGNIFICANT CHANGE UP (ref 3.8–10.5)
WBC # FLD AUTO: 10.68 K/UL — HIGH (ref 3.8–10.5)

## 2023-03-17 PROCEDURE — 71045 X-RAY EXAM CHEST 1 VIEW: CPT | Mod: 26,77

## 2023-03-17 PROCEDURE — 99291 CRITICAL CARE FIRST HOUR: CPT

## 2023-03-17 PROCEDURE — 71045 X-RAY EXAM CHEST 1 VIEW: CPT | Mod: 26

## 2023-03-17 RX ORDER — NOREPINEPHRINE BITARTRATE/D5W 8 MG/250ML
0.05 PLASTIC BAG, INJECTION (ML) INTRAVENOUS
Qty: 8 | Refills: 0 | Status: DISCONTINUED | OUTPATIENT
Start: 2023-03-17 | End: 2023-03-18

## 2023-03-17 RX ORDER — MIDODRINE HYDROCHLORIDE 2.5 MG/1
20 TABLET ORAL ONCE
Refills: 0 | Status: DISCONTINUED | OUTPATIENT
Start: 2023-03-17 | End: 2023-03-17

## 2023-03-17 RX ORDER — POTASSIUM CHLORIDE 20 MEQ
40 PACKET (EA) ORAL ONCE
Refills: 0 | Status: COMPLETED | OUTPATIENT
Start: 2023-03-17 | End: 2023-03-17

## 2023-03-17 RX ORDER — LIDOCAINE 4 G/100G
1 CREAM TOPICAL EVERY 24 HOURS
Refills: 0 | Status: DISCONTINUED | OUTPATIENT
Start: 2023-03-17 | End: 2023-03-23

## 2023-03-17 RX ORDER — ROBINUL 0.2 MG/ML
0.2 INJECTION INTRAMUSCULAR; INTRAVENOUS ONCE
Refills: 0 | Status: COMPLETED | OUTPATIENT
Start: 2023-03-17 | End: 2023-03-17

## 2023-03-17 RX ORDER — POTASSIUM CHLORIDE 20 MEQ
10 PACKET (EA) ORAL
Refills: 0 | Status: COMPLETED | OUTPATIENT
Start: 2023-03-17 | End: 2023-03-17

## 2023-03-17 RX ORDER — NOREPINEPHRINE BITARTRATE/D5W 8 MG/250ML
0.05 PLASTIC BAG, INJECTION (ML) INTRAVENOUS
Qty: 8 | Refills: 0 | Status: DISCONTINUED | OUTPATIENT
Start: 2023-03-17 | End: 2023-03-17

## 2023-03-17 RX ORDER — MIDODRINE HYDROCHLORIDE 2.5 MG/1
20 TABLET ORAL EVERY 8 HOURS
Refills: 0 | Status: DISCONTINUED | OUTPATIENT
Start: 2023-03-17 | End: 2023-03-18

## 2023-03-17 RX ORDER — POTASSIUM CHLORIDE 20 MEQ
20 PACKET (EA) ORAL ONCE
Refills: 0 | Status: DISCONTINUED | OUTPATIENT
Start: 2023-03-17 | End: 2023-03-17

## 2023-03-17 RX ORDER — BUMETANIDE 0.25 MG/ML
2 INJECTION INTRAMUSCULAR; INTRAVENOUS ONCE
Refills: 0 | Status: COMPLETED | OUTPATIENT
Start: 2023-03-17 | End: 2023-03-17

## 2023-03-17 RX ADMIN — Medication 100 MILLIEQUIVALENT(S): at 12:32

## 2023-03-17 RX ADMIN — CHLORHEXIDINE GLUCONATE 1 APPLICATION(S): 213 SOLUTION TOPICAL at 05:56

## 2023-03-17 RX ADMIN — Medication 100 MILLIEQUIVALENT(S): at 11:37

## 2023-03-17 RX ADMIN — HEPARIN SODIUM 1100 UNIT(S)/HR: 5000 INJECTION INTRAVENOUS; SUBCUTANEOUS at 06:21

## 2023-03-17 RX ADMIN — Medication 5.86 MICROGRAM(S)/KG/MIN: at 21:39

## 2023-03-17 RX ADMIN — Medication 5.86 MICROGRAM(S)/KG/MIN: at 10:24

## 2023-03-17 RX ADMIN — PIPERACILLIN AND TAZOBACTAM 25 GRAM(S): 4; .5 INJECTION, POWDER, LYOPHILIZED, FOR SOLUTION INTRAVENOUS at 17:07

## 2023-03-17 RX ADMIN — BUMETANIDE 2 MILLIGRAM(S): 0.25 INJECTION INTRAMUSCULAR; INTRAVENOUS at 10:25

## 2023-03-17 RX ADMIN — PIPERACILLIN AND TAZOBACTAM 25 GRAM(S): 4; .5 INJECTION, POWDER, LYOPHILIZED, FOR SOLUTION INTRAVENOUS at 05:17

## 2023-03-17 RX ADMIN — Medication 100 MILLIEQUIVALENT(S): at 14:41

## 2023-03-17 RX ADMIN — ROBINUL 0.2 MILLIGRAM(S): 0.2 INJECTION INTRAMUSCULAR; INTRAVENOUS at 18:58

## 2023-03-17 RX ADMIN — Medication 40 MILLIEQUIVALENT(S): at 02:08

## 2023-03-17 RX ADMIN — Medication 70 MICROGRAM(S): at 21:26

## 2023-03-17 RX ADMIN — MIDODRINE HYDROCHLORIDE 20 MILLIGRAM(S): 2.5 TABLET ORAL at 21:26

## 2023-03-17 RX ADMIN — PANTOPRAZOLE SODIUM 40 MILLIGRAM(S): 20 TABLET, DELAYED RELEASE ORAL at 12:36

## 2023-03-17 RX ADMIN — LIDOCAINE 1 PATCH: 4 CREAM TOPICAL at 21:51

## 2023-03-17 NOTE — PROGRESS NOTE ADULT - SUBJECTIVE AND OBJECTIVE BOX
INTERVAL HPI/OVERNIGHT EVENTS: Pt was extubated to face tent overnight.    SUBJECTIVE: Patient seen and examined at bedside.       VITAL SIGNS:  ICU Vital Signs Last 24 Hrs  T(C): 36.4 (17 Mar 2023 04:00), Max: 36.6 (16 Mar 2023 08:00)  T(F): 97.5 (17 Mar 2023 04:00), Max: 97.9 (16 Mar 2023 16:00)  HR: 76 (17 Mar 2023 07:00) (58 - 90)  BP: 102/59 (17 Mar 2023 07:00) (91/54 - 154/83)  BP(mean): 73 (17 Mar 2023 07:00) (65 - 105)  ABP: --  ABP(mean): --  RR: 17 (17 Mar 2023 07:00) (10 - 25)  SpO2: 100% (17 Mar 2023 07:00) (100% - 100%)    O2 Parameters below as of 17 Mar 2023 07:00  Patient On (Oxygen Delivery Method): face tent  O2 Flow (L/min): 10  O2 Concentration (%): 50      Mode: CPAP with PS, FiO2: 30, PEEP: 5, MAP: 8, PIP: 13  Plateau pressure:   P/F ratio:     03-16 @ 07:01  -  03-17 @ 07:00  --------------------------------------------------------  IN: 2063.3 mL / OUT: 3355 mL / NET: -1291.7 mL      CAPILLARY BLOOD GLUCOSE        ECG:    PHYSICAL EXAM:    General:   HEENT:   Neck:   Respiratory:   Cardiovascular:   Abdomen:   Extremities:  Neurological:    MEDICATIONS:  MEDICATIONS  (STANDING):  chlorhexidine 2% Cloths 1 Application(s) Topical <User Schedule>  dexMEDEtomidine Infusion 0.2 MICROgram(s)/kG/Hr (3.13 mL/Hr) IV Continuous <Continuous>  heparin  Infusion.  Unit(s)/Hr (11 mL/Hr) IV Continuous <Continuous>  levothyroxine Injectable 70 MICROGram(s) IV Push at bedtime  norepinephrine Infusion 0.05 MICROgram(s)/kG/Min (5.86 mL/Hr) IV Continuous <Continuous>  pantoprazole  Injectable 40 milliGRAM(s) IV Push daily  piperacillin/tazobactam IVPB.. 3.375 Gram(s) IV Intermittent every 12 hours  propofol Infusion 10 MICROgram(s)/kG/Min (3.75 mL/Hr) IV Continuous <Continuous>    MEDICATIONS  (PRN):  fentaNYL    Injectable 50 MICROGram(s) IV Push every 2 hours PRN vent desynchrony  heparin   Injectable 5000 Unit(s) IV Push every 6 hours PRN For aPTT less than 40  heparin   Injectable 2500 Unit(s) IV Push every 6 hours PRN For aPTT between 40 - 57      ALLERGIES:  Allergies    No Known Allergies    Intolerances        LABS:                        9.4    10.68 )-----------( 448      ( 17 Mar 2023 00:25 )             31.0     03-17    138  |  97<L>  |  18  ----------------------------<  207<H>  3.6   |  29  |  1.27    Ca    9.0      17 Mar 2023 00:25  Phos  3.9     03-17  Mg     2.00     03-17      PT/INR - ( 15 Mar 2023 20:10 )   PT: 22.5 sec;   INR: 1.93 ratio         PTT - ( 17 Mar 2023 05:47 )  PTT:82.2 sec      RADIOLOGY & ADDITIONAL TESTS: Reviewed.   INTERVAL HPI/OVERNIGHT EVENTS: Pt was extubated to face tent overnight.    SUBJECTIVE: Patient seen and examined at bedside. No acute complaints. Pt at baseline mental status per daughter but just a little more groggy. Speech as difficult to understand per daughter but pt knew she was in the hospital and knew her birth date.       VITAL SIGNS:  ICU Vital Signs Last 24 Hrs  T(C): 36.4 (17 Mar 2023 04:00), Max: 36.6 (16 Mar 2023 08:00)  T(F): 97.5 (17 Mar 2023 04:00), Max: 97.9 (16 Mar 2023 16:00)  HR: 76 (17 Mar 2023 07:00) (58 - 90)  BP: 102/59 (17 Mar 2023 07:00) (91/54 - 154/83)  BP(mean): 73 (17 Mar 2023 07:00) (65 - 105)  ABP: --  ABP(mean): --  RR: 17 (17 Mar 2023 07:00) (10 - 25)  SpO2: 100% (17 Mar 2023 07:00) (100% - 100%)    O2 Parameters below as of 17 Mar 2023 07:00  Patient On (Oxygen Delivery Method): face tent  O2 Flow (L/min): 10  O2 Concentration (%): 50      Mode: CPAP with PS, FiO2: 30, PEEP: 5, MAP: 8, PIP: 13  Plateau pressure:   P/F ratio:     03-16 @ 07:01  -  03-17 @ 07:00  --------------------------------------------------------  IN: 2063.3 mL / OUT: 3355 mL / NET: -1291.7 mL      CAPILLARY BLOOD GLUCOSE        ECG:    PHYSICAL EXAM:  General: NAD  HEENT: NC/AT; pupils reactive, clear conjunctiva  Neck: supple  Respiratory: mildly increased breath sounds  Cardiovascular: +S1/S2; RRR  Abdomen: soft, NT/ND; +BS x4  Extremities: WWP, 2+ peripheral pulses b/l; no LE edema  Skin: normal color and turgor; no rash  Neurological: A&Ox2-3    MEDICATIONS:  MEDICATIONS  (STANDING):  chlorhexidine 2% Cloths 1 Application(s) Topical <User Schedule>  dexMEDEtomidine Infusion 0.2 MICROgram(s)/kG/Hr (3.13 mL/Hr) IV Continuous <Continuous>  heparin  Infusion.  Unit(s)/Hr (11 mL/Hr) IV Continuous <Continuous>  levothyroxine Injectable 70 MICROGram(s) IV Push at bedtime  norepinephrine Infusion 0.05 MICROgram(s)/kG/Min (5.86 mL/Hr) IV Continuous <Continuous>  pantoprazole  Injectable 40 milliGRAM(s) IV Push daily  piperacillin/tazobactam IVPB.. 3.375 Gram(s) IV Intermittent every 12 hours  propofol Infusion 10 MICROgram(s)/kG/Min (3.75 mL/Hr) IV Continuous <Continuous>    MEDICATIONS  (PRN):  fentaNYL    Injectable 50 MICROGram(s) IV Push every 2 hours PRN vent desynchrony  heparin   Injectable 5000 Unit(s) IV Push every 6 hours PRN For aPTT less than 40  heparin   Injectable 2500 Unit(s) IV Push every 6 hours PRN For aPTT between 40 - 57      ALLERGIES:  Allergies    No Known Allergies    Intolerances        LABS:                        9.4    10.68 )-----------( 448      ( 17 Mar 2023 00:25 )             31.0     03-17    138  |  97<L>  |  18  ----------------------------<  207<H>  3.6   |  29  |  1.27    Ca    9.0      17 Mar 2023 00:25  Phos  3.9     03-17  Mg     2.00     03-17      PT/INR - ( 15 Mar 2023 20:10 )   PT: 22.5 sec;   INR: 1.93 ratio         PTT - ( 17 Mar 2023 05:47 )  PTT:82.2 sec      RADIOLOGY & ADDITIONAL TESTS: Reviewed.

## 2023-03-17 NOTE — PROGRESS NOTE ADULT - SUBJECTIVE AND OBJECTIVE BOX
Date of service 3/17/23    chief complaint: SOB    extended hpi: 88-year-old female with a pmh of A-fib on Xarelto, GERD, hypothyroid, hypertension, hyperlipidemia, arthritis who inially presented with complaints of shortness of breath for a few weeks and generalized weakness. She was admitted for Aspiration PNA, had rapid response called after patient was given water to drink as part of dysphagia screen found to be gurgling and then subsequently became unresponsive. After intubation patient hypotensive requiring pressors.    extubated and awake on hi flow oxygen mask.  hoarse voice and only speaks Turkish but does not voice any concerns or complaints thru her daughter at bedside.    Review of Systems:   Constitutional: [ ] fevers, [ ] chills.   Skin: [ ] dry skin. [ ] rashes.  Psychiatric: [ ] depression, [ ] anxiety.   Gastrointestinal: [ ] BRBPR, [ ] melena.   Neurological: [ ] confusion. [ ] seizures. [ ] shuffling gait.   Ears,Nose,Mouth and Throat: [ ] ear pain [ ] sore throat.   Eyes: [ ] diplopia.   Respiratory: [ ] hemoptysis. [ ] shortness of breath  Cardiovascular: See HPI above  Hematologic/Lymphatic: [ ] anemia. [ ] painful nodes. [ ] prolonged bleeding.   Genitourinary: [ ] hematuria. [ ] flank pain.   Endocrine: [ ] significant change in weight. [ ] intolerance to heat and cold.     Review of systems [ ] otherwise negative, [x ] otherwise unable to obtain    FH: no family history of sudden cardiac death in first degree relatives    SH: [ ] tobacco, [ ] alcohol, [ ] drugs    chlorhexidine 2% Cloths 1 Application(s) Topical <User Schedule>  fentaNYL    Injectable 50 MICROGram(s) IV Push every 2 hours PRN  heparin   Injectable 5000 Unit(s) IV Push every 6 hours PRN  heparin   Injectable 2500 Unit(s) IV Push every 6 hours PRN  heparin  Infusion.  Unit(s)/Hr IV Continuous <Continuous>  levothyroxine Injectable 70 MICROGram(s) IV Push at bedtime  midodrine 20 milliGRAM(s) Oral once  norepinephrine Infusion 0.05 MICROgram(s)/kG/Min IV Continuous <Continuous>  pantoprazole  Injectable 40 milliGRAM(s) IV Push daily  piperacillin/tazobactam IVPB.. 3.375 Gram(s) IV Intermittent every 12 hours  potassium chloride  10 mEq/100 mL IVPB 10 milliEquivalent(s) IV Intermittent every 1 hour                            9.4    10.68 )-----------( 448      ( 17 Mar 2023 00:25 )             31.0       03-17    138  |  97<L>  |  18  ----------------------------<  207<H>  3.6   |  29  |  1.27    Ca    9.0      17 Mar 2023 00:25  Phos  3.9     03-17  Mg     2.00     03-17        CARDIAC MARKERS ( 17 Mar 2023 00:25 )  x     / x     / 12 U/L / x     / x      CARDIAC MARKERS ( 16 Mar 2023 12:01 )  x     / x     / 20 U/L / x     / 1.6 ng/mL  CARDIAC MARKERS ( 16 Mar 2023 06:32 )  x     / x     / x     / x     / 2.2 ng/mL  CARDIAC MARKERS ( 16 Mar 2023 02:35 )  x     / x     / 29 U/L / x     / x      CARDIAC MARKERS ( 15 Mar 2023 17:36 )  x     / x     / 26 U/L / x     / 2.3 ng/mL  CARDIAC MARKERS ( 15 Mar 2023 11:00 )  x     / x     / 30 U/L / x     / 1.6 ng/mL      T(C): 37 (03-17-23 @ 08:00), Max: 37 (03-17-23 @ 08:00)  HR: 82 (03-17-23 @ 12:15) (58 - 87)  BP: 115/66 (03-17-23 @ 12:15) (91/54 - 154/83)  RR: 14 (03-17-23 @ 12:15) (8 - 25)  SpO2: 100% (03-17-23 @ 12:15) (100% - 100%)  Wt(kg): --    I&O's Summary    16 Mar 2023 07:01  -  17 Mar 2023 07:00  --------------------------------------------------------  IN: 2163.3 mL / OUT: 3355 mL / NET: -1191.7 mL    17 Mar 2023 07:01  -  17 Mar 2023 12:19  --------------------------------------------------------  IN: 67.6 mL / OUT: 525 mL / NET: -457.4 mL    General: awake and alert, cooperative.  speaks only Tuvaluan, daughter translates.  Head: Normocephalic and atraumatic.   Neck: No JVD. No bruits. Supple. Does not appear to be enlarged.   Cardiovascular: + S1,S2 ; RRR Soft systolic murmur at the left lower sternal border. No rubs noted.    Lungs: coarse BS BL  Abdomen: + BS, soft. Non tender. Non distended. No rebound. No guarding.   Extremities: No clubbing/cyanosis/edema.   Neurologic: unable to assess  Skin: Warm and moist. The patient's skin has normal elasticity and good skin turgor.   Psychiatric: unable to assess  Musculoskeletal: unable to assess    DATA    tele- SR ST      < from: Transthoracic Echocardiogram (03.15.23 @ 13:49) >  CONCLUSIONS:  1. Mitral annular calcification, otherwise normal mitral  valve. Mild-moderate mitral regurgitation.  2. Calcified trileaflet aortic valve with normal opening.  Mild aortic regurgitation.  3. Normal left ventricular internal dimensions and wall  thicknesses.  4. Endocardium not well visualized; grossly moderate global  left ventricular systolic dysfunction.  5. Mild diastolic dysfunction (Stage I).  6. The right ventricle is not well visualized; grossly  normal right ventricular systolic function.  ------------------------------------------------------------------------  Confirmed on  3/15/2023 - 15:05:45 by Kaveh Jimenez M.D.,  [facility], [facility]  -----------------------    < end of copied text >    ASSESSMENT/PLAN: Pt is a 88-year-old female with a pmh of A-fib on Xarelto, GERD, hypothyroid, hypertension, hyperlipidemia, arthritis who inially presented with complaints of shortness of breath for a few weeks and generalized weakness.  She was admitted for Aspiration PNA, had rapid response called after patient was given water to drink as part of dysphagia screen found to be gurgling and then subsequently became unresponsive. After intubation patient hypotensive requiring pressor support.      1. Shock  - likely septic due to asp PNA and +UA  - previous normal LVEF on echo, now with LVEF 40%--suspect due to septic shock  -repeat echo next week to re eval.  -weaning NorEpi from low dose --> off.    2. PAF  - in sinus   -continue Heparin Gtt while Xarelto on hold    3. Elevated Trop T  --in the setting of PANCHITO, sepsis  --not c/w ACS  --repeat Echo next week to re eval LV function    Thank you for allowing us to participate in the care of our mutual patient.  Please do not hesitate to call with any questions.     Nikolai Castellano M.D.  Cardiac Electrophysiology  [phone]

## 2023-03-17 NOTE — PROGRESS NOTE ADULT - ATTENDING COMMENTS
Patient is an 87 yo F w/ A-fib on Xarelto, GERD, hypothyroid, HTN, HLD, arthritis who was initially admitted on 3/14 after presenting recent SOB and cough in setting of a recent aspiration event 2 weeks PTA. Patient initially admitted to medicine for further management of possible aspiration PNA and UTI with course c/b acute obtundation in setting of recurrent possible aspiration.     #Shock - Likely mixed in setting of sepsis and decreased cardiac function on POCUS  #ADHF  #Sepsis  #UTI  - c/w vasopressors to maintain MAP > 65, wean as tolerated  - c/w empiric Zosyn  - f/u cultures    #Respiratory failure  - c/w mechanical ventilation support  - Attempt SBT    #Encephalopathy - Unclear etiology.    - CTH negative  - Wean sedation to monitor mental status
Patient is an 87 yo F w/ A-fib on Xarelto, GERD, hypothyroid, HTN, HLD, arthritis who was initially admitted on 3/14 after presenting recent SOB and cough in setting of a recent aspiration event 2 weeks PTA. Patient initially admitted to medicine for further management of possible aspiration PNA and UTI with course c/b acute obtundation in setting of recurrent possible aspiration.     #Shock - Likely mixed in setting of sepsis and decreased cardiac function on POCUS  #ADHF  #Sepsis  #UTI  - c/w vasopressors to maintain MAP > 65, wean as tolerated  - c/w empiric Zosyn  - f/u cultures    #Respiratory failure  - extubated this AM, now on face tent  - wean to NC  - follow up speech therapy    #Encephalopathy - Unclear etiology.    - CTH negative  - clinically, she is improved

## 2023-03-17 NOTE — PROGRESS NOTE ADULT - ASSESSMENT
88-year-old female with past medical history of A-fib on Xarelto, GERD, hypothyroid, hypertension, hyperlipidemia, arthritis presenting with shortness of breath likely secondary to aspiration pneumonia.  Intubated 3/15 for airway protection in setting of possible aspiration event.      #Neuro  -intubated, sedated on prop and versed- weaned off sedation for possible SBT  - CTH 3/15: No acute intracranial hemorrhage, mass effect, or shift of the midline structures.    #CV  //Vasoplegic shock vs septic shock vs cardiogenic   -previously reported normal cardiac fxn as per cardiology  -required levophed and phenylephrine post intubation- phenylephrine titrated off now on levo 0.3  - POCUS: Moderately decreased LV systolic function. LVOT VTI 13 cm. RV smaller than LV in size. IVC 2 cm.  - TTE from 3/15 showed EF of 40% with grossly moderate global left ventricular systolic dysfunction.  - Trops downtrended x2  - s/p 324mg aspirin - no need to continue per cards  - bumex 2mg x2 on 3/15 and 3/16  - Cardiology following      //Afib on xarelto  -on home xarelto- holding for now  -hold home metoprolol given vasoplegic shock post-intubation.   -currently on hep gtt       #Pulm  //?Aspiration PNA  -Intubated 3/15 for airway protection  -abx as below  - CXR: clear lungs  - f/u sputum cx    #GI  -TYRONE  -OGT for feeds- jevity ordered  -c/w PPI  - nutrition consult placed    #Renal/  -Cr 0.49  -monitor I/Os: patient s/p porras insetion with minimal urine output  - s/p 20 lasix x 1 and bumex 2mg with minimal urine output  -+UA with E coli growing in urine  -Zosyn for UTI    #Heme  -mild normocytic anemia   -CTM H/H  -no evidence of bleed  -on heparin gtt for A-fib    #endocrine  //Hypothyroid  -c/w synthroid 70 po  -TSH (h) 9.3 free T4 1.4  -. Monitor off ISS    #ID  //?Aspiration PNA  - f//u sputum for cx   - c/w Zosyn empiric (3/14- )   //+UA   -Blood cx, Urine Cx and MRSA swab pending   - Grossly + UA 3/15 and urine cx  - s/p CTX      #skin  -TYRONE    #DVT Ppx  -therapeutic AC for afib on xarelto at home  -currently on heparin gtt for A fib    #Ethics  -full code 88-year-old female with past medical history of A-fib on Xarelto, GERD, hypothyroid, hypertension, hyperlipidemia, arthritis presenting with shortness of breath likely secondary to aspiration pneumonia. Found to have UTI on admission. Intubated 3/15 for airway protection in setting of possible aspiration event. Extubated 3/17.     #Neuro  - extubated, A&Ox2-3, baseline per daughter  - CTH 3/15: No acute intracranial hemorrhage, mass effect, or shift of the midline structures.    #CV  //Vasoplegic shock vs septic shock vs cardiogenic   -previously reported normal cardiac fxn as per cardiology  -required levophed and phenylephrine post intubation- phenylephrine titrated off now on levo 0.04  - POCUS: Moderately decreased LV systolic function. LVOT VTI 13 cm. RV smaller than LV in size. IVC 2 cm.  - TTE from 3/15 showed EF of 40% with grossly moderate global left ventricular systolic dysfunction.  - Trops downtrended x2  - s/p 324mg aspirin - no need to continue per cards  - bumex 2mg x2 on 3/15 and 3/16  - Cardiology following      //Afib on xarelto  -on home xarelto- holding for now  -hold home metoprolol given vasoplegic shock post-intubation.   -currently on hep gtt       #Pulm  //?Aspiration PNA  -Intubated 3/15 for airway protection  -abx as below  - CXR: clear lungs  - f/u sputum cx  - satting well after extubation 3/17    #GI  -TYRONE  -NPO with tube feeds    #Renal/  -Cr 0.49  -monitor I/Os: patient s/p porras insetion with minimal urine output  - s/p 20 lasix x 1 and bumex 2mg with minimal urine output  -+UA with E coli growing in urine  -Zosyn for UTI    #Heme  -mild normocytic anemia   -CTM H/H  -no evidence of bleed  -on heparin gtt for A-fib    #endocrine  //Hypothyroid  -c/w synthroid 70 po  -TSH (h) 9.3 free T4 1.4  -. Monitor off ISS    #ID  //?Aspiration PNA  - f//u sputum for cx   - MRSA neg  - c/w Zosyn empiric (3/14- )     //+UA   -Blood cx pending   - Grossly + UA 3/15 and urine cx grew E. coli  - s/p CTX      #skin  -TYRONE    #DVT Ppx  -therapeutic AC for afib on xarelto at home  -currently on heparin gtt for A fib    #Ethics  -full code 88-year-old female with past medical history of A-fib on Xarelto, GERD, hypothyroid, hypertension, hyperlipidemia, arthritis presenting with shortness of breath likely secondary to aspiration pneumonia. Found to have UTI on admission. Intubated 3/15 for airway protection in setting of possible aspiration event. Extubated 3/17.     #Neuro  - extubated, A&Ox2-3, baseline per daughter  - CTH 3/15: No acute intracranial hemorrhage, mass effect, or shift of the midline structures.    #CV  //Vasoplegic shock vs septic shock vs cardiogenic   -previously reported normal cardiac fxn as per cardiology  -required levophed and phenylephrine post intubation- phenylephrine titrated off now on levo 0.04  - POCUS: Moderately decreased LV systolic function. LVOT VTI 13 cm. RV smaller than LV in size. IVC 2 cm.  - TTE from 3/15 showed EF of 40% with grossly moderate global left ventricular systolic dysfunction.  - Trops downtrended x2  - s/p 324mg aspirin - no need to continue per cards  - bumex 2mg x2 on 3/15 and 3/16  - Cardiology following  - f/u repeat echo in 1-2 weeks      //Afib on xarelto  -on home xarelto- holding for now  -hold home metoprolol given vasoplegic shock post-intubation.   -currently on hep gtt       #Pulm  //?Aspiration PNA  -Intubated 3/15 for airway protection  -abx as below  - CXR: clear lungs  - f/u sputum cx  - satting well after extubation 3/17    #GI  -TYRONE  -NPO with tube feeds    #Renal/  -Cr 0.49  -monitor I/Os: patient s/p porras insetion with minimal urine output  - s/p 20 lasix x 1 and bumex 2mg with minimal urine output  -+UA with E coli growing in urine  -Zosyn for UTI    #Heme  -mild normocytic anemia   -CTM H/H  -no evidence of bleed  -on heparin gtt for A-fib    #endocrine  //Hypothyroid  -c/w synthroid 70 po  -TSH (h) 9.3 free T4 1.4  -. Monitor off ISS    #ID  //?Aspiration PNA  - f//u sputum for cx   - MRSA neg  - c/w Zosyn empiric (3/14- )     //+UA   -Blood cx pending   - Grossly + UA 3/15 and urine cx grew E. coli  - s/p CTX      #skin  -TYRONE    #DVT Ppx  -therapeutic AC for afib on xarelto at home  -currently on heparin gtt for A fib    #Ethics  -full code 88-year-old female with past medical history of A-fib on Xarelto, GERD, hypothyroid, hypertension, hyperlipidemia, arthritis presenting with shortness of breath likely secondary to aspiration pneumonia. Found to have UTI on admission. Intubated 3/15 for airway protection in setting of possible aspiration event. Extubated 3/17.     #Neuro  - extubated, A&Ox2-3, baseline per daughter  - CTH 3/15: No acute intracranial hemorrhage, mass effect, or shift of the midline structures.    #CV  //Vasoplegic shock vs septic shock vs cardiogenic   -previously reported normal cardiac fxn as per cardiology  -required levophed and phenylephrine post intubation- phenylephrine titrated off now on levo 0.04  - POCUS: Moderately decreased LV systolic function. LVOT VTI 13 cm. RV smaller than LV in size. IVC 2 cm.  - TTE from 3/15 showed EF of 40% with grossly moderate global left ventricular systolic dysfunction.  - Trops downtrended x2  - s/p 324mg aspirin - no need to continue per cards  - bumex 2mg x3 on 3/15, 3/16, 3/17, goal net negative 0.5-1L  - Cardiology following  - f/u repeat echo in 1-2 weeks  - f/u 4pm BMP      //Afib on xarelto  -on home xarelto- holding for now  -hold home metoprolol given vasoplegic shock post-intubation.   -currently on hep gtt   -25mg BID metoprolol      #Pulm  //?Aspiration PNA  -Intubated 3/15 for airway protection  -abx as below  - CXR: clear lungs  - f/u sputum cx  - satting well after extubation 3/17    #GI  -TYRONE  -NPO with tube feeds  -speech and swallow    #Renal/  -Cr 0.49  -monitor I/Os: patient s/p porras insetion with minimal urine output  - s/p 20 lasix x 1 and bumex 2mg with minimal urine output  -+UA with E coli growing in urine  -Zosyn for UTI    #Heme  -mild normocytic anemia   -CTM H/H  -no evidence of bleed  -on heparin gtt for A-fib    #endocrine  //Hypothyroid  -c/w synthroid 70 po  -TSH (h) 9.3 free T4 1.4  -. Monitor off ISS    #ID  //?Aspiration PNA  - f//u sputum for cx   - MRSA neg  - c/w Zosyn empiric (3/14- )     //+UA   -Blood cx pending   - Grossly + UA 3/15 and urine cx grew E. coli  - s/p CTX      #skin  -TYRONE    #DVT Ppx  -therapeutic AC for afib on xarelto at home  -currently on heparin gtt for A fib    #Ethics  -full code 88-year-old female with past medical history of A-fib on Xarelto, GERD, hypothyroid, hypertension, hyperlipidemia, arthritis presenting with shortness of breath likely secondary to aspiration pneumonia. Found to have UTI on admission. Intubated 3/15 for airway protection in setting of possible aspiration event. Extubated 3/17.     #Neuro  - extubated, A&Ox2-3, baseline per daughter  - CTH 3/15: No acute intracranial hemorrhage, mass effect, or shift of the midline structures.    #CV  //Vasoplegic shock vs septic shock vs cardiogenic   -previously reported normal cardiac fxn as per cardiology  -required levophed and phenylephrine post intubation- phenylephrine titrated off now on levo 0.04  - POCUS: Moderately decreased LV systolic function. LVOT VTI 13 cm. RV smaller than LV in size. IVC 2 cm.  - TTE from 3/15 showed EF of 40% with grossly moderate global left ventricular systolic dysfunction.  - Trops downtrended x2  - s/p 324mg aspirin - no need to continue per cards  - bumex 2mg x3 on 3/15, 3/16, 3/17, goal net negative 0.5-1L  - Cardiology following  - f/u repeat echo in 1-2 weeks  - f/u 4pm BMP  - trial on midodrine and wean pressors      //Afib on xarelto  -on home xarelto- holding for now  -hold home metoprolol given vasoplegic shock post-intubation.   -currently on hep gtt         #Pulm  //?Aspiration PNA  -Intubated 3/15 for airway protection  -abx as below  - CXR: clear lungs  - f/u sputum cx  - satting well after extubation 3/17    #GI  -TYRONE  -NPO with tube feeds  -speech and swallow    #Renal/  -Cr 0.49  -monitor I/Os: patient s/p porras insetion with minimal urine output  - s/p 20 lasix x 1 and bumex 2mg with minimal urine output  -+UA with E coli growing in urine  -Zosyn for UTI    #Heme  -mild normocytic anemia   -CTM H/H  -no evidence of bleed  -on heparin gtt for A-fib    #endocrine  //Hypothyroid  -c/w synthroid 70 po  -TSH (h) 9.3 free T4 1.4  -. Monitor off ISS    #ID  //?Aspiration PNA  - f//u sputum for cx   - MRSA neg  - c/w Zosyn empiric (3/14- )     //+UA   -Blood cx pending   - Grossly + UA 3/15 and urine cx grew E. coli  - s/p CTX      #skin  -TYRONE    #DVT Ppx  -therapeutic AC for afib on xarelto at home  -currently on heparin gtt for A fib    #Ethics  -full code

## 2023-03-18 DIAGNOSIS — I10 ESSENTIAL (PRIMARY) HYPERTENSION: ICD-10-CM

## 2023-03-18 DIAGNOSIS — E78.5 HYPERLIPIDEMIA, UNSPECIFIED: ICD-10-CM

## 2023-03-18 DIAGNOSIS — I50.20 UNSPECIFIED SYSTOLIC (CONGESTIVE) HEART FAILURE: ICD-10-CM

## 2023-03-18 LAB
ANION GAP SERPL CALC-SCNC: 11 MMOL/L — SIGNIFICANT CHANGE UP (ref 7–14)
APTT BLD: 80.5 SEC — HIGH (ref 27–36.3)
BASE EXCESS BLDV CALC-SCNC: 9.2 MMOL/L — HIGH (ref -2–3)
BLOOD GAS VENOUS COMPREHENSIVE RESULT: SIGNIFICANT CHANGE UP
BUN SERPL-MCNC: 15 MG/DL — SIGNIFICANT CHANGE UP (ref 7–23)
CALCIUM SERPL-MCNC: 9.5 MG/DL — SIGNIFICANT CHANGE UP (ref 8.4–10.5)
CHLORIDE BLDV-SCNC: 101 MMOL/L — SIGNIFICANT CHANGE UP (ref 96–108)
CHLORIDE SERPL-SCNC: 98 MMOL/L — SIGNIFICANT CHANGE UP (ref 98–107)
CO2 BLDV-SCNC: 36.2 MMOL/L — HIGH (ref 22–26)
CO2 SERPL-SCNC: 29 MMOL/L — SIGNIFICANT CHANGE UP (ref 22–31)
CREAT SERPL-MCNC: 1.03 MG/DL — SIGNIFICANT CHANGE UP (ref 0.5–1.3)
CULTURE RESULTS: SIGNIFICANT CHANGE UP
EGFR: 52 ML/MIN/1.73M2 — LOW
GAS PNL BLDV: 138 MMOL/L — SIGNIFICANT CHANGE UP (ref 136–145)
GAS PNL BLDV: SIGNIFICANT CHANGE UP
GLUCOSE BLDC GLUCOMTR-MCNC: 99 MG/DL — SIGNIFICANT CHANGE UP (ref 70–99)
GLUCOSE BLDV-MCNC: 83 MG/DL — SIGNIFICANT CHANGE UP (ref 70–99)
GLUCOSE SERPL-MCNC: 86 MG/DL — SIGNIFICANT CHANGE UP (ref 70–99)
HCO3 BLDV-SCNC: 35 MMOL/L — HIGH (ref 22–29)
HCT VFR BLD CALC: 31 % — LOW (ref 34.5–45)
HCT VFR BLDA CALC: 28 % — LOW (ref 34.5–46.5)
HGB BLD CALC-MCNC: 9.4 G/DL — LOW (ref 11.7–16.1)
HGB BLD-MCNC: 9.1 G/DL — LOW (ref 11.5–15.5)
INR BLD: 1.46 RATIO — HIGH (ref 0.88–1.16)
LACTATE BLDV-MCNC: 1 MMOL/L — SIGNIFICANT CHANGE UP (ref 0.5–2)
MAGNESIUM SERPL-MCNC: 1.7 MG/DL — SIGNIFICANT CHANGE UP (ref 1.6–2.6)
MCHC RBC-ENTMCNC: 24.5 PG — LOW (ref 27–34)
MCHC RBC-ENTMCNC: 29.4 GM/DL — LOW (ref 32–36)
MCV RBC AUTO: 83.6 FL — SIGNIFICANT CHANGE UP (ref 80–100)
NRBC # BLD: 0 /100 WBCS — SIGNIFICANT CHANGE UP (ref 0–0)
NRBC # FLD: 0 K/UL — SIGNIFICANT CHANGE UP (ref 0–0)
PCO2 BLDV: 51 MMHG — SIGNIFICANT CHANGE UP (ref 39–52)
PH BLDV: 7.44 — HIGH (ref 7.32–7.43)
PHOSPHATE SERPL-MCNC: 3 MG/DL — SIGNIFICANT CHANGE UP (ref 2.5–4.5)
PLATELET # BLD AUTO: 392 K/UL — SIGNIFICANT CHANGE UP (ref 150–400)
PO2 BLDV: 64 MMHG — HIGH (ref 25–45)
POTASSIUM BLDV-SCNC: 3.4 MMOL/L — LOW (ref 3.5–5.1)
POTASSIUM SERPL-MCNC: 3.6 MMOL/L — SIGNIFICANT CHANGE UP (ref 3.5–5.3)
POTASSIUM SERPL-SCNC: 3.6 MMOL/L — SIGNIFICANT CHANGE UP (ref 3.5–5.3)
PROTHROM AB SERPL-ACNC: 17 SEC — HIGH (ref 10.5–13.4)
RBC # BLD: 3.71 M/UL — LOW (ref 3.8–5.2)
RBC # FLD: 15.9 % — HIGH (ref 10.3–14.5)
SAO2 % BLDV: 92.5 % — HIGH (ref 67–88)
SODIUM SERPL-SCNC: 138 MMOL/L — SIGNIFICANT CHANGE UP (ref 135–145)
SPECIMEN SOURCE: SIGNIFICANT CHANGE UP
WBC # BLD: 9.15 K/UL — SIGNIFICANT CHANGE UP (ref 3.8–10.5)
WBC # FLD AUTO: 9.15 K/UL — SIGNIFICANT CHANGE UP (ref 3.8–10.5)

## 2023-03-18 PROCEDURE — 99233 SBSQ HOSP IP/OBS HIGH 50: CPT

## 2023-03-18 PROCEDURE — 93010 ELECTROCARDIOGRAM REPORT: CPT

## 2023-03-18 PROCEDURE — 71045 X-RAY EXAM CHEST 1 VIEW: CPT | Mod: 26

## 2023-03-18 RX ORDER — ONDANSETRON 8 MG/1
4 TABLET, FILM COATED ORAL EVERY 6 HOURS
Refills: 0 | Status: DISCONTINUED | OUTPATIENT
Start: 2023-03-18 | End: 2023-03-23

## 2023-03-18 RX ORDER — ACETAMINOPHEN 500 MG
1000 TABLET ORAL ONCE
Refills: 0 | Status: COMPLETED | OUTPATIENT
Start: 2023-03-18 | End: 2023-03-18

## 2023-03-18 RX ORDER — SENNA PLUS 8.6 MG/1
2 TABLET ORAL AT BEDTIME
Refills: 0 | Status: DISCONTINUED | OUTPATIENT
Start: 2023-03-18 | End: 2023-03-23

## 2023-03-18 RX ORDER — POTASSIUM CHLORIDE 20 MEQ
40 PACKET (EA) ORAL ONCE
Refills: 0 | Status: COMPLETED | OUTPATIENT
Start: 2023-03-18 | End: 2023-03-18

## 2023-03-18 RX ORDER — TRAMADOL HYDROCHLORIDE 50 MG/1
25 TABLET ORAL ONCE
Refills: 0 | Status: DISCONTINUED | OUTPATIENT
Start: 2023-03-18 | End: 2023-03-18

## 2023-03-18 RX ORDER — ROBINUL 0.2 MG/ML
0.2 INJECTION INTRAMUSCULAR; INTRAVENOUS ONCE
Refills: 0 | Status: COMPLETED | OUTPATIENT
Start: 2023-03-18 | End: 2023-03-18

## 2023-03-18 RX ORDER — PANTOPRAZOLE SODIUM 20 MG/1
40 TABLET, DELAYED RELEASE ORAL
Refills: 0 | Status: DISCONTINUED | OUTPATIENT
Start: 2023-03-18 | End: 2023-03-23

## 2023-03-18 RX ORDER — MAGNESIUM SULFATE 500 MG/ML
1 VIAL (ML) INJECTION ONCE
Refills: 0 | Status: COMPLETED | OUTPATIENT
Start: 2023-03-18 | End: 2023-03-18

## 2023-03-18 RX ORDER — MIDODRINE HYDROCHLORIDE 2.5 MG/1
10 TABLET ORAL EVERY 8 HOURS
Refills: 0 | Status: DISCONTINUED | OUTPATIENT
Start: 2023-03-19 | End: 2023-03-23

## 2023-03-18 RX ORDER — LEVOTHYROXINE SODIUM 125 MCG
100 TABLET ORAL DAILY
Refills: 0 | Status: DISCONTINUED | OUTPATIENT
Start: 2023-03-18 | End: 2023-03-23

## 2023-03-18 RX ORDER — RIVAROXABAN 15 MG-20MG
20 KIT ORAL
Refills: 0 | Status: DISCONTINUED | OUTPATIENT
Start: 2023-03-18 | End: 2023-03-23

## 2023-03-18 RX ORDER — POLYETHYLENE GLYCOL 3350 17 G/17G
17 POWDER, FOR SOLUTION ORAL DAILY
Refills: 0 | Status: DISCONTINUED | OUTPATIENT
Start: 2023-03-18 | End: 2023-03-23

## 2023-03-18 RX ADMIN — MIDODRINE HYDROCHLORIDE 20 MILLIGRAM(S): 2.5 TABLET ORAL at 05:03

## 2023-03-18 RX ADMIN — Medication 100 GRAM(S): at 03:26

## 2023-03-18 RX ADMIN — Medication 40 MILLIEQUIVALENT(S): at 03:26

## 2023-03-18 RX ADMIN — HEPARIN SODIUM 1100 UNIT(S)/HR: 5000 INJECTION INTRAVENOUS; SUBCUTANEOUS at 09:04

## 2023-03-18 RX ADMIN — MIDODRINE HYDROCHLORIDE 20 MILLIGRAM(S): 2.5 TABLET ORAL at 13:15

## 2023-03-18 RX ADMIN — Medication 1000 MILLIGRAM(S): at 02:21

## 2023-03-18 RX ADMIN — TRAMADOL HYDROCHLORIDE 25 MILLIGRAM(S): 50 TABLET ORAL at 03:15

## 2023-03-18 RX ADMIN — SENNA PLUS 2 TABLET(S): 8.6 TABLET ORAL at 21:44

## 2023-03-18 RX ADMIN — Medication 400 MILLIGRAM(S): at 01:51

## 2023-03-18 RX ADMIN — PIPERACILLIN AND TAZOBACTAM 25 GRAM(S): 4; .5 INJECTION, POWDER, LYOPHILIZED, FOR SOLUTION INTRAVENOUS at 05:04

## 2023-03-18 RX ADMIN — TRAMADOL HYDROCHLORIDE 25 MILLIGRAM(S): 50 TABLET ORAL at 03:30

## 2023-03-18 RX ADMIN — RIVAROXABAN 20 MILLIGRAM(S): KIT at 17:58

## 2023-03-18 RX ADMIN — CHLORHEXIDINE GLUCONATE 1 APPLICATION(S): 213 SOLUTION TOPICAL at 05:04

## 2023-03-18 RX ADMIN — ROBINUL 0.2 MILLIGRAM(S): 0.2 INJECTION INTRAMUSCULAR; INTRAVENOUS at 04:48

## 2023-03-18 RX ADMIN — PIPERACILLIN AND TAZOBACTAM 25 GRAM(S): 4; .5 INJECTION, POWDER, LYOPHILIZED, FOR SOLUTION INTRAVENOUS at 17:58

## 2023-03-18 RX ADMIN — PANTOPRAZOLE SODIUM 40 MILLIGRAM(S): 20 TABLET, DELAYED RELEASE ORAL at 13:15

## 2023-03-18 NOTE — PHYSICAL THERAPY INITIAL EVALUATION ADULT - ADDITIONAL COMMENTS
Spo2 assessed to be 96% on room air     Pt. left comfortable in bed with all tubes/lines intact, call bell in reach and in NAD.    daughter provided translation services throughout session

## 2023-03-18 NOTE — SWALLOW BEDSIDE ASSESSMENT ADULT - SWALLOW EVAL: DIAGNOSIS
1. Functional oral stage for puree and moderately thick liquids (2oz each) marked by adequate oral acceptance, collection and transfer. 2. Functional pharyngeal phase for puree/moderately thick liquids marked by a present pharyngeal swallow trigger with hyolaryngeal elevation noted upon digital palpation without evidence of impaired airway protection. Of Note: patient exhibited facial grimacing and declined further PO trials given odynophagia state despite encouragement from family/SLP.

## 2023-03-18 NOTE — CHART NOTE - NSCHARTNOTEFT_GEN_A_CORE
MAR Accept Note  Transfer to: [address] [place] [address] Attending Physician: Dr. Quinn     Assigned Room:  429A    Patient seen and examined.   Labs and data reviewed.   No findings precluding transfer of service.     HPI/MICU COURSE:   Please refer to MICU transfer note for full details. Briefly, this is an 88-year-old woman who is followed for atrial fibrillation, hypertension, hyperlipidemia, and hypothyroidism who presented initially for evaluation and management of progressive shortness of breath in the setting of choking while eating--concerning for the development of aspiration pneumonia vs. aspiration pneumonitis. Despite management on the medical floor, the patient developed progressive hypoxic respiratory failure that necessitated endotracheal intubation and transfer of care to the medical intensive care unit. Her course was complicated by the development of a mixed shock state requiring intravenous vasopressor support that was successfully weaned following extubation on 3/17. The patient's course further was complicated by the development of encephalopathy in the setting of critical illness.    FOR FOLLOW-UP: [ ] Evaluate and manage any and all reversible etiologies of ongoing encephalopathy   [ ] Midodrine added initially for support when weaning vasopressors; wean as tolerated; evaluate for, and manage, all other possible etiologies of shock    [ ] Wean nasal cannula as tolerated   [ ] Ensure nasogastric tube okay to use; if so, administer feeds and medications as necessary   [ ] As able, perform swallow evaluation to assess if patient can be transitioned to oral diet   [ ] Continue pip/tazo for anticipated seven-day course for treatment of pneumonia   [ ] Transition as able from intravenous heparin infusion to home dose of rivaroxaban   [ ] Re-start metoprolol as able     Ric Medina PGY3  Medicine Admitting Resident--[id number]

## 2023-03-18 NOTE — SWALLOW BEDSIDE ASSESSMENT ADULT - COMMENTS
Cardiology note 3/18 "88-year-old female with a pmh of A-fib on Xarelto, GERD, hypothyroid, hypertension, hyperlipidemia, arthritis who inially presented with complaints of shortness of breath for a few weeks and generalized weakness.  She was admitted for Aspiration PNA, had rapid response called after patient was given water to drink as part of dysphagia screen found to be gurgling and then subsequently became unresponsive. After intubation patient hypotensive requiring pressor support."    Patient seen at bedside this afternoon for an initial assessment of the swallow function, at which time patient was alert. Patient's son and daughter present at bedside and provided German translation (per patient preference). Patient's son states a few weeks ago patient had a choking episode when taking medication requiring the Heimlich Maneuver. Patient with NG tube in place upon arrival. Patient reporting severe odynophagia (unable to state pain scale). ACP made aware. Patient declined further trials given odynophagia despite encouragement.

## 2023-03-18 NOTE — PHYSICAL THERAPY INITIAL EVALUATION ADULT - PERTINENT HX OF CURRENT PROBLEM, REHAB EVAL
88-year-old female with past medical history of A-fib on Xarelto, GERD, hypothyroid, hypertension, hyperlipidemia, arthritis that causes increased difficulty with mobility presents complaining of shortness of breath for a few weeks and generalized weakness. Despite management on the medical floor, the patient developed progressive hypoxic respiratory failure that necessitated endotracheal intubation and transfer of care to the medical intensive care unit.

## 2023-03-18 NOTE — PROGRESS NOTE ADULT - PROBLEM SELECTOR PLAN 1
- pt with aspiration event outpt 1wk prior to admission with subsequent development of productive cough; saw PCP with CXR c/f aspiration PNA and referred to ER   - course c/b aspiration event with RRT -- not protecting airway and was I&S and transferred to MICU; extubated 3/17 to venturi mask and now on RA  - tracheal sputum cx neg, MRSA neg  - S+S consulted -- recommending pureed diet with moderately thick liquids  - f/u XR cinesophagogram  - aspiration precautions  - ?consider glycopyrolate if secretions do not improve -- suspect component of throat irritation from recent intubation; can call ENT if not improved in the next 24h

## 2023-03-18 NOTE — PHYSICAL THERAPY INITIAL EVALUATION ADULT - RANGE OF MOTION EXAMINATION, REHAB EVAL
right shoulder ROM 0-45 degrees, remainder of right UE WFL, left shoulder ROM 0-70 degrees remainder left UE WFL/bilateral lower extremity ROM was WFL (within functional limits)

## 2023-03-18 NOTE — PROGRESS NOTE ADULT - PROBLEM SELECTOR PLAN 8
- F: none  - E: replete K<4, Mg<2  - N: pureed diet, moderately thick liquids  - D: xarelto  - G: protonix    code: full  dispo: pending medical optimization; PT recs AFTAB

## 2023-03-18 NOTE — PROGRESS NOTE ADULT - SUBJECTIVE AND OBJECTIVE BOX
Date of service 3/18/23    chief complaint: SOB    extended hpi: 88-year-old female with a pmh of A-fib on Xarelto, GERD, hypothyroid, hypertension, hyperlipidemia, arthritis who inially presented with complaints of shortness of breath for a few weeks and generalized weakness. She was admitted for Aspiration PNA, had rapid response called after patient was given water to drink as part of dysphagia screen found to be gurgling and then subsequently became unresponsive. After intubation patient hypotensive requiring pressors.    Transferred out of ICU last night, pt seen on Medicine floor, offers no complaints     Review of Systems:   Constitutional: [ ] fevers, [ ] chills.   Skin: [ ] dry skin. [ ] rashes.  Psychiatric: [ ] depression, [ ] anxiety.   Gastrointestinal: [ ] BRBPR, [ ] melena.   Neurological: [ ] confusion. [ ] seizures. [ ] shuffling gait.   Ears,Nose,Mouth and Throat: [ ] ear pain [ ] sore throat.   Eyes: [ ] diplopia.   Respiratory: [ ] hemoptysis. [ ] shortness of breath  Cardiovascular: See HPI above  Hematologic/Lymphatic: [ ] anemia. [ ] painful nodes. [ ] prolonged bleeding.   Genitourinary: [ ] hematuria. [ ] flank pain.   Endocrine: [ ] significant change in weight. [ ] intolerance to heat and cold.     Review of systems [ ] otherwise negative, [x ] otherwise unable to obtain    FH: no family history of sudden cardiac death in first degree relatives    SH: [ ] tobacco, [ ] alcohol, [ ] drugs         heparin  Infusion.  Unit(s)/Hr IV Continuous <Continuous>  levothyroxine Injectable 70 MICROGram(s) IV Push at bedtime  lidocaine   4% Patch 1 Patch Transdermal every 24 hours PRN  midodrine 20 milliGRAM(s) Oral every 8 hours  pantoprazole  Injectable 40 milliGRAM(s) IV Push daily  piperacillin/tazobactam IVPB.. 3.375 Gram(s) IV Intermittent every 12 hours                            9.1    9.15  )-----------( 392      ( 18 Mar 2023 01:48 )             31.0       Hemoglobin: 9.1 g/dL (03-18 @ 01:48)  Hemoglobin: 9.7 g/dL (03-17 @ 16:30)  Hemoglobin: 9.4 g/dL (03-17 @ 00:25)  Hemoglobin: 10.2 g/dL (03-16 @ 02:35)  Hemoglobin: 10.7 g/dL (03-15 @ 06:08)      03-18    138  |  98  |  15  ----------------------------<  86  3.6   |  29  |  1.03    Ca    9.5      18 Mar 2023 01:48  Phos  3.0     03-18  Mg     1.70     03-18    TPro  6.6  /  Alb  3.0<L>  /  TBili  0.4  /  DBili  <0.2  /  AST  15  /  ALT  6   /  AlkPhos  71  03-17    Creatinine Trend: 1.03<--, 1.03<--, 1.27<--, 1.47<--, 1.52<--, 1.50<--    COAGS: PT/INR - ( 18 Mar 2023 01:48 )   PT: 17.0 sec;   INR: 1.46 ratio         PTT - ( 18 Mar 2023 01:48 )  PTT:80.5 sec    CARDIAC MARKERS ( 17 Mar 2023 16:30 )  x     / x     / 48 U/L / x     / x      CARDIAC MARKERS ( 17 Mar 2023 00:25 )  x     / x     / 12 U/L / x     / x      CARDIAC MARKERS ( 16 Mar 2023 12:01 )  x     / x     / 20 U/L / x     / 1.6 ng/mL  CARDIAC MARKERS ( 16 Mar 2023 06:32 )  x     / x     / x     / x     / 2.2 ng/mL  CARDIAC MARKERS ( 16 Mar 2023 02:35 )  x     / x     / 29 U/L / x     / x      CARDIAC MARKERS ( 15 Mar 2023 17:36 )  x     / x     / 26 U/L / x     / 2.3 ng/mL  CARDIAC MARKERS ( 15 Mar 2023 11:00 )  x     / x     / 30 U/L / x     / 1.6 ng/mL        T(C): 37.8 (03-18-23 @ 05:00), Max: 37.8 (03-18-23 @ 05:00)  HR: 100 (03-18-23 @ 06:00) (73 - 113)  BP: 125/68 (03-18-23 @ 06:00) (92/53 - 148/99)  RR: 12 (03-18-23 @ 06:00) (8 - 33)  SpO2: 98% (03-18-23 @ 06:00) (93% - 100%)  Wt(kg): --    I&O's Summary    17 Mar 2023 07:01  -  18 Mar 2023 07:00  --------------------------------------------------------  IN: 686.1 mL / OUT: 3300 mL / NET: -2613.9 mL        General: awake and alert, cooperative.  speaks only Tajik, daughter translates.  Head: Normocephalic and atraumatic.   Neck: No JVD. No bruits. Supple. Does not appear to be enlarged.   Cardiovascular: + S1,S2 ; RRR Soft systolic murmur at the left lower sternal border. No rubs noted.    Lungs: coarse BS BL  Abdomen: + BS, soft. Non tender. Non distended. No rebound. No guarding.   Extremities: No clubbing/cyanosis/edema.   Neurologic: unable to assess  Skin: Warm and moist. The patient's skin has normal elasticity and good skin turgor.   Psychiatric: unable to assess  Musculoskeletal: unable to assess    DATA    tele- SR ST      < from: Transthoracic Echocardiogram (03.15.23 @ 13:49) >  CONCLUSIONS:  1. Mitral annular calcification, otherwise normal mitral  valve. Mild-moderate mitral regurgitation.  2. Calcified trileaflet aortic valve with normal opening.  Mild aortic regurgitation.  3. Normal left ventricular internal dimensions and wall  thicknesses.  4. Endocardium not well visualized; grossly moderate global  left ventricular systolic dysfunction.  5. Mild diastolic dysfunction (Stage I).  6. The right ventricle is not well visualized; grossly  normal right ventricular systolic function.  ------------------------------------------------------------------------  Confirmed on  3/15/2023 - 15:05:45 by Kaveh Jimenez M.D.,  [facility]  -----------------------    < end of copied text >    ASSESSMENT/PLAN: Pt is a 88-year-old female with a pmh of A-fib on Xarelto, GERD, hypothyroid, hypertension, hyperlipidemia, arthritis who inially presented with complaints of shortness of breath for a few weeks and generalized weakness.  She was admitted for Aspiration PNA, had rapid response called after patient was given water to drink as part of dysphagia screen found to be gurgling and then subsequently became unresponsive. After intubation patient hypotensive requiring pressor support.      1. Shock  - likely septic due to asp PNA and +UA  - previous normal LVEF on echo, now with LVEF 40%--suspect due to septic shock  -repeat echo next week to re eval.  - off pressors > 48 hr , bp stable on proamatine    2. PAF  -  stable NSR   -continue Heparin Gtt while Xarelto on hold    3. Elevated Trop T  --in the setting of PANCHITO, sepsis  --not c/w ACS  --repeat Echo next week to re eval LV function

## 2023-03-18 NOTE — PROGRESS NOTE ADULT - PROBLEM SELECTOR PLAN 4
- home med: xarelto 20mg daily  - on hep gtt while NPO  - now tolerating PO -- will switch back to xarelto

## 2023-03-18 NOTE — PROGRESS NOTE ADULT - SUBJECTIVE AND OBJECTIVE BOX
[facility] Department of Hospital Medicine  Malinda Guthrie DO  Available on MS Teams  Pager: [phone]    Patient is a 88y old  Female who presents with a chief complaint of Shortness of breath (18 Mar 2023 10:13)    Subjective:  Pt seen and examined at bedside resting comfortably in bed; daughter bedside who assisted with translation. Relieved that she is out of the ICU. Denies any acute complaints. Happy that the NGT can be removed and she can eat. Has not had a BM in many days, would like a bowel regimen.    VITAL SIGNS:  T(C): 37.6 (03-18-23 @ 17:00), Max: 37.8 (03-18-23 @ 05:00)  T(F): 99.6 (03-18-23 @ 17:00), Max: 100 (03-18-23 @ 05:00)  HR: 74 (03-18-23 @ 17:00) (74 - 105)  BP: 134/66 (03-18-23 @ 17:00) (101/51 - 134/66)  BP(mean): 85 (03-18-23 @ 06:00) (66 - 85)  RR: 18 (03-18-23 @ 17:00) (11 - 33)  SpO2: 100% (03-18-23 @ 17:00) (94% - 100%)  Wt(kg): --    PHYSICAL EXAM:  Constitutional: elderly frail F; sitting comfortably in bed; NAD  Head: NC/AT  Eyes: PERRL, EOMI, anicteric sclera  ENT: no nasal discharge; MMM; NGT in place with tube feeds running  Neck: supple; no JVD  Respiratory: CTA B/L; no W/R/R; on RA without respiratory distress  Cardiac: +S1/S2; RRR; no M/R/G  Gastrointestinal: soft, NT/ND; no rebound or guarding; +BSx4  Extremities: WWP, no clubbing or cyanosis; no peripheral edema  Musculoskeletal: NROM x4; no joint swelling, tenderness or erythema  Vascular: 2+ radial, DP/PT pulses B/L  Dermatologic: skin warm, dry and intact; no rashes, wounds, or scars  Neurologic: AAOx2 (self, hospital); CNII-XII grossly intact; no focal deficits  Psychiatric: affect and characteristics of appearance, verbalizations, behaviors are appropriate    MEDICATIONS  (STANDING):  levothyroxine Injectable 70 MICROGram(s) IV Push at bedtime  midodrine 20 milliGRAM(s) Oral every 8 hours  pantoprazole  Injectable 40 milliGRAM(s) IV Push daily  piperacillin/tazobactam IVPB.. 3.375 Gram(s) IV Intermittent every 12 hours  polyethylene glycol 3350 17 Gram(s) Oral daily  rivaroxaban 20 milliGRAM(s) Oral with dinner  senna 2 Tablet(s) Oral at bedtime    MEDICATIONS  (PRN):  lidocaine   4% Patch 1 Patch Transdermal every 24 hours PRN back pain  ondansetron Injectable 4 milliGRAM(s) IV Push every 6 hours PRN Nausea and/or Vomiting    LABS:                        9.1    9.15  )-----------( 392      ( 18 Mar 2023 01:48 )             31.0     03-18    138  |  98  |  15  ----------------------------<  86  3.6   |  29  |  1.03    Ca    9.5      18 Mar 2023 01:48  Phos  3.0     03-18  Mg     1.70     03-18    TPro  6.6  /  Alb  3.0<L>  /  TBili  0.4  /  DBili  <0.2  /  AST  15  /  ALT  6   /  AlkPhos  71  03-17    PT/INR - ( 18 Mar 2023 01:48 )   PT: 17.0 sec;   INR: 1.46 ratio         PTT - ( 18 Mar 2023 01:48 )  PTT:80.5 sec    CAPILLARY BLOOD GLUCOSE      POCT Blood Glucose.: 99 mg/dL (18 Mar 2023 09:15)      RADIOLOGY & ADDITIONAL TESTS: Reviewed.

## 2023-03-18 NOTE — CHART NOTE - NSCHARTNOTEFT_GEN_A_CORE
MICU Transfer Note    Transfer from: MICU    Transfer to: (x ) Medicine    ( ) Telemetry     ( ) RCU        ( ) Palliative         ( ) Stroke Unit          ( ) __________________    Accepting physician:      MICU COURSE:  88-year-old female with past medical history of A-fib on Xarelto, GERD, hypothyroid, hypertension, hyperlipidemia, arthritis that causes increased difficulty with mobility presents complaining of shortness of breath for a few weeks and generalized weakness. Patient daughter reports that approximately 1 week ago was eating well without issues, then patient choked on a pill and after this she started to have increasing cough with foamy sputum without blood.  Patient saw her primary doctor Albertina Yi who performed a chest x-ray with concern for possible aspiration pneumonia and sent patient to the ER for further evaluation.  Daughter also notes patient has had foul-smelling urine.  Pt admitted to medicine for Aspiration PNA.    RRT called 3/15 6AM after pt was given water to drink as part of dysphagia screen. On repeat exam pt was found to be gurgling on water, not protecting airway so intubated emergently.    Patient remained intubated on 3/16, then extubated to face tent on 3/17 AM. She was successfully weaned down to RA on 3/18. Patient was initially on levophed with sedation but NGT was placed and transitioned to midodrine 20. Levophed was weaned off. Patient was deemed stable for transfer back to floors.         ASSESSMENT & PLAN:   88-year-old female with past medical history of A-fib on Xarelto, GERD, hypothyroid, hypertension, hyperlipidemia, arthritis presenting with shortness of breath likely secondary to aspiration pneumonia. Found to have UTI on admission. Intubated 3/15 for airway protection in setting of possible aspiration event. Extubated 3/17.     #Neuro  - extubated, A&Ox2-3, baseline per daughter  - CTH 3/15: No acute intracranial hemorrhage, mass effect, or shift of the midline structures.    #CV  //Vasoplegic shock vs septic shock vs cardiogenic   -previously reported normal cardiac fxn as per cardiology  -required levophed and phenylephrine post intubation- phenylephrine titrated off now on levo 0.04  - POCUS: Moderately decreased LV systolic function. LVOT VTI 13 cm. RV smaller than LV in size. IVC 2 cm.  - TTE from 3/15 showed EF of 40% with grossly moderate global left ventricular systolic dysfunction.  - Trops downtrended x2  - s/p 324mg aspirin - no need to continue per cards  - bumex 2mg x3 on 3/15, 3/16, 3/17, goal net negative 0.5-1L  - Cardiology following  - f/u repeat echo in 1-2 weeks  - f/u 4pm BMP  - trial on midodrine and wean pressors      //Afib on xarelto  -on home xarelto- holding for now  -hold home metoprolol given vasoplegic shock post-intubation.   -currently on hep gtt         #Pulm  //?Aspiration PNA  -Intubated 3/15 for airway protection  -abx as below  - CXR: clear lungs  - f/u sputum cx  - satting well after extubation 3/17    #GI  -TYRONE  -NPO with tube feeds  -speech and swallow    #Renal/  -Cr 0.49  -monitor I/Os: patient s/p porras insetion with minimal urine output  - s/p 20 lasix x 1 and bumex 2mg with minimal urine output  -+UA with E coli growing in urine  -Zosyn for UTI    #Heme  -mild normocytic anemia   -CTM H/H  -no evidence of bleed  -on heparin gtt for A-fib    #endocrine  //Hypothyroid  -c/w synthroid 70 po  -TSH (h) 9.3 free T4 1.4  -. Monitor off ISS    #ID  //?Aspiration PNA  - f//u sputum for cx   - MRSA neg  - c/w Zosyn empiric (3/14- )     //+UA   -Blood cx pending   - Grossly + UA 3/15 and urine cx grew E. coli  - s/p CTX      #skin  -TYRONE    #DVT Ppx  -therapeutic AC for afib on xarelto at home  -currently on heparin gtt for A fib    #Ethics  -full code        For Followup:  - titrate down on midodrine as tolerated  - f/u cardiology recs regarding further cardiac workup  - f/u speech and swallow        Vitals  T(F): 99.7 (03-18-23 @ 01:00), Max: 99.9 (03-17-23 @ 16:00)  HR: 99 (03-18-23 @ 04:00) (68 - 113)  BP: 114/68 (03-18-23 @ 04:00) (92/53 - 154/83)  BP(mean): 82 (03-18-23 @ 04:00) (65 - 114)  ABP: --  ABP(mean): --  RR: 11 (03-18-23 @ 04:00) (8 - 33)  SpO2: 96% (03-18-23 @ 04:00) (93% - 100%)  I/O Summary 24H    IN: 2163.3 mL / OUT: 3355 mL / NET: -1191.7 mL        MEDICATIONS  (STANDING):  chlorhexidine 2% Cloths 1 Application(s) Topical <User Schedule>  heparin  Infusion.  Unit(s)/Hr (11 mL/Hr) IV Continuous <Continuous>  levothyroxine Injectable 70 MICROGram(s) IV Push at bedtime  midodrine 20 milliGRAM(s) Oral every 8 hours  pantoprazole  Injectable 40 milliGRAM(s) IV Push daily  piperacillin/tazobactam IVPB.. 3.375 Gram(s) IV Intermittent every 12 hours    MEDICATIONS  (PRN):  heparin   Injectable 5000 Unit(s) IV Push every 6 hours PRN For aPTT less than 40  heparin   Injectable 2500 Unit(s) IV Push every 6 hours PRN For aPTT between 40 - 57  lidocaine   4% Patch 1 Patch Transdermal every 24 hours PRN back pain        LABS  CBC 03-18-23 @ 01:48                        9.1    9.15  )-----------( 392                   31.0       Hgb trend: 9.1 <-- , 9.7 <-- , 9.4 <-- , 10.2 <-- , 10.7 <--   WBC trend: 9.15 <-- , 10.25 <-- , 10.68 <-- , 16.36 <-- , 7.23 <--     CMP 03-18-23 @ 01:48    138  |  98  |  15  ----------------------------<  86  3.6   |  29  |  1.03    Ca    9.5      03-18-23 @ 01:48  Phos  3.0     03-18  Mg     1.70     03-18    TPro  6.6  /  Alb  3.0<L>  /  TBili  0.4  /  DBili  <0.2  /  AST  15  /  ALT  6   /  AlkPhos  71  03-17      Serum Cr trend: 1.03 <-- , 1.03 <-- , 1.27 <-- , 1.47 <-- , 1.52 <-- , 1.50 <-- , 1.25 <-- , 0.50 <--   PT/INR - ( 18 Mar 2023 01:48 )   PT: 17.0 sec;   INR: 1.46 ratio         PTT - ( 18 Mar 2023 01:48 ):80.5 sec MICU Transfer Note    Transfer from: MICU    Transfer to: (x ) Medicine    ( ) Telemetry     ( ) RCU        ( ) Palliative         ( ) Stroke Unit          ( ) __________________    Accepting physician:      MICU COURSE:  88-year-old female with past medical history of A-fib on Xarelto, GERD, hypothyroid, hypertension, hyperlipidemia, arthritis that causes increased difficulty with mobility presents complaining of shortness of breath for a few weeks and generalized weakness. Patient daughter reports that approximately 1 week ago was eating well without issues, then patient choked on a pill and after this she started to have increasing cough with foamy sputum without blood.  Patient saw her primary doctor Albertina Yi who performed a chest x-ray with concern for possible aspiration pneumonia and sent patient to the ER for further evaluation.  Daughter also notes patient has had foul-smelling urine.  Pt admitted to medicine for Aspiration PNA.    RRT called 3/15 6AM after pt was given water to drink as part of dysphagia screen. On repeat exam pt was found to be gurgling on water, not protecting airway so intubated emergently.    Patient remained intubated on 3/16, then extubated to face tent on 3/17 AM. She was successfully weaned down to RA on 3/18. Patient was initially on levophed with sedation but NGT was placed and transitioned to midodrine 20. Levophed was weaned off. Patient was deemed stable for transfer back to floors.         ASSESSMENT & PLAN:   88-year-old female with past medical history of A-fib on Xarelto, GERD, hypothyroid, hypertension, hyperlipidemia, arthritis presenting with shortness of breath likely secondary to aspiration pneumonia. Found to have UTI on admission. Intubated 3/15 for airway protection in setting of possible aspiration event. Extubated 3/17.     #Neuro  - extubated, A&Ox2-3, baseline per daughter  - CTH 3/15: No acute intracranial hemorrhage, mass effect, or shift of the midline structures.    #CV  //Vasoplegic shock vs septic shock vs cardiogenic   -previously reported normal cardiac fxn as per cardiology  -required levophed and phenylephrine post intubation- phenylephrine titrated off now on levo 0.04  - POCUS: Moderately decreased LV systolic function. LVOT VTI 13 cm. RV smaller than LV in size. IVC 2 cm.  - TTE from 3/15 showed EF of 40% with grossly moderate global left ventricular systolic dysfunction.  - Trops downtrended x2  - s/p 324mg aspirin - no need to continue per cards  - bumex 2mg x3 on 3/15, 3/16, 3/17, goal net negative 0.5-1L  - Cardiology following  - f/u repeat echo in 1-2 weeks  - f/u 4pm BMP  - trial on midodrine and wean pressors      //Afib on xarelto  -on home xarelto- holding for now  -hold home metoprolol given vasoplegic shock post-intubation.   -currently on hep gtt         #Pulm  //?Aspiration PNA  -Intubated 3/15 for airway protection  -abx as below  - CXR: clear lungs  - f/u sputum cx  - satting well after extubation 3/17    #GI  -TYRONE  -NPO with tube feeds  -speech and swallow    #Renal/  -Cr 0.49  -monitor I/Os: patient s/p porras insetion with minimal urine output  - s/p 20 lasix x 1 and bumex 2mg with minimal urine output  -+UA with E coli growing in urine  -Zosyn for UTI    #Heme  -mild normocytic anemia   -CTM H/H  -no evidence of bleed  -on heparin gtt for A-fib    #endocrine  //Hypothyroid  -c/w synthroid 70 po  -TSH (h) 9.3 free T4 1.4  -. Monitor off ISS    #ID  //?Aspiration PNA  - f//u sputum for cx   - MRSA neg  - c/w Zosyn empiric (3/14- )     //+UA   -Blood cx pending   - Grossly + UA 3/15 and urine cx grew E. coli  - s/p CTX      #skin  -TYRONE    #DVT Ppx  -therapeutic AC for afib on xarelto at home  -currently on heparin gtt for A fib    #Ethics  -full code        For Followup:  - titrate down on midodrine as tolerated  - f/u cardiology recs regarding further cardiac workup  - f/u speech and swallow  - glycopyrrolate as needed        Vitals  T(F): 99.7 (03-18-23 @ 01:00), Max: 99.9 (03-17-23 @ 16:00)  HR: 99 (03-18-23 @ 04:00) (68 - 113)  BP: 114/68 (03-18-23 @ 04:00) (92/53 - 154/83)  BP(mean): 82 (03-18-23 @ 04:00) (65 - 114)  ABP: --  ABP(mean): --  RR: 11 (03-18-23 @ 04:00) (8 - 33)  SpO2: 96% (03-18-23 @ 04:00) (93% - 100%)  I/O Summary 24H    IN: 2163.3 mL / OUT: 3355 mL / NET: -1191.7 mL        MEDICATIONS  (STANDING):  chlorhexidine 2% Cloths 1 Application(s) Topical <User Schedule>  heparin  Infusion.  Unit(s)/Hr (11 mL/Hr) IV Continuous <Continuous>  levothyroxine Injectable 70 MICROGram(s) IV Push at bedtime  midodrine 20 milliGRAM(s) Oral every 8 hours  pantoprazole  Injectable 40 milliGRAM(s) IV Push daily  piperacillin/tazobactam IVPB.. 3.375 Gram(s) IV Intermittent every 12 hours    MEDICATIONS  (PRN):  heparin   Injectable 5000 Unit(s) IV Push every 6 hours PRN For aPTT less than 40  heparin   Injectable 2500 Unit(s) IV Push every 6 hours PRN For aPTT between 40 - 57  lidocaine   4% Patch 1 Patch Transdermal every 24 hours PRN back pain        LABS  CBC 03-18-23 @ 01:48                        9.1    9.15  )-----------( 392                   31.0       Hgb trend: 9.1 <-- , 9.7 <-- , 9.4 <-- , 10.2 <-- , 10.7 <--   WBC trend: 9.15 <-- , 10.25 <-- , 10.68 <-- , 16.36 <-- , 7.23 <--     CMP 03-18-23 @ 01:48    138  |  98  |  15  ----------------------------<  86  3.6   |  29  |  1.03    Ca    9.5      03-18-23 @ 01:48  Phos  3.0     03-18  Mg     1.70     03-18    TPro  6.6  /  Alb  3.0<L>  /  TBili  0.4  /  DBili  <0.2  /  AST  15  /  ALT  6   /  AlkPhos  71  03-17      Serum Cr trend: 1.03 <-- , 1.03 <-- , 1.27 <-- , 1.47 <-- , 1.52 <-- , 1.50 <-- , 1.25 <-- , 0.50 <--   PT/INR - ( 18 Mar 2023 01:48 )   PT: 17.0 sec;   INR: 1.46 ratio         PTT - ( 18 Mar 2023 01:48 ):80.5 sec MICU Transfer Note    Transfer from: MICU    Transfer to: (x ) Medicine    ( ) Telemetry     ( ) RCU        ( ) Palliative         ( ) Stroke Unit          ( ) __________________    Accepting physician:      MICU COURSE:  88-year-old female with past medical history of A-fib on Xarelto, GERD, hypothyroid, hypertension, hyperlipidemia, arthritis that causes increased difficulty with mobility presents complaining of shortness of breath for a few weeks and generalized weakness. Patient daughter reports that approximately 1 week ago was eating well without issues, then patient choked on a pill and after this she started to have increasing cough with foamy sputum without blood.  Patient saw her primary doctor Albertina Yi who performed a chest x-ray with concern for possible aspiration pneumonia and sent patient to the ER for further evaluation.  Daughter also notes patient has had foul-smelling urine.  Pt admitted to medicine for Aspiration PNA.    RRT called 3/15 6AM after pt was given water to drink as part of dysphagia screen. On repeat exam pt was found to be gurgling on water, not protecting airway so intubated emergently.    Patient remained intubated on 3/16, then extubated to face tent on 3/17 AM. She was successfully weaned down to RA on 3/18. Patient was initially on levophed with sedation but NGT was placed and transitioned to midodrine 20. Levophed was weaned off. Patient was deemed stable for transfer back to floors.         ASSESSMENT & PLAN:   88-year-old female with past medical history of A-fib on Xarelto, GERD, hypothyroid, hypertension, hyperlipidemia, arthritis presenting with shortness of breath likely secondary to aspiration pneumonia. Found to have UTI on admission. Intubated 3/15 for airway protection in setting of possible aspiration event. Extubated 3/17.     #Neuro  - extubated, A&Ox2-3, baseline per daughter  - CTH 3/15: No acute intracranial hemorrhage, mass effect, or shift of the midline structures.    #CV  //Vasoplegic shock vs septic shock vs cardiogenic   -previously reported normal cardiac fxn as per cardiology  -required levophed and phenylephrine post intubation- phenylephrine titrated off now on levo 0.04  - POCUS: Moderately decreased LV systolic function. LVOT VTI 13 cm. RV smaller than LV in size. IVC 2 cm.  - TTE from 3/15 showed EF of 40% with grossly moderate global left ventricular systolic dysfunction.  - Trops downtrended x2  - s/p 324mg aspirin - no need to continue per cards  - bumex 2mg x3 on 3/15, 3/16, 3/17, goal net negative 0.5-1L  - Cardiology following  - f/u repeat echo in 1-2 weeks  - f/u 4pm BMP  - trial on midodrine and wean pressors      //Afib on xarelto  -on home xarelto- holding for now  -hold home metoprolol given vasoplegic shock post-intubation.   -currently on hep gtt         #Pulm  //?Aspiration PNA  -Intubated 3/15 for airway protection  -abx as below  - CXR: clear lungs  - f/u sputum cx  - satting well after extubation 3/17    #GI  -TYRONE  -NPO with tube feeds  -speech and swallow    #Renal/  -Cr 0.49  -monitor I/Os: patient s/p porras insetion with minimal urine output  - s/p 20 lasix x 1 and bumex 2mg with minimal urine output  -+UA with E coli growing in urine  -Zosyn for UTI    #Heme  -mild normocytic anemia   -CTM H/H  -no evidence of bleed  -on heparin gtt for A-fib    #endocrine  //Hypothyroid  -c/w synthroid 70 po  -TSH (h) 9.3 free T4 1.4  -. Monitor off ISS    #ID  //?Aspiration PNA  - f//u sputum for cx   - MRSA neg  - c/w Zosyn empiric (3/14- )     //+UA   -Blood cx pending   - Grossly + UA 3/15 and urine cx grew E. coli  - s/p CTX      #skin  -TYRONE    #DVT Ppx  -therapeutic AC for afib on xarelto at home  -currently on heparin gtt for A fib    #Ethics  -full code        For Followup:  - titrate down on midodrine as tolerated  - wean oxygen as tolerated   - f/u cardiology recs regarding further cardiac workup  - f/u speech and swallow  - glycopyrrolate as needed        Vitals  T(F): 99.7 (03-18-23 @ 01:00), Max: 99.9 (03-17-23 @ 16:00)  HR: 99 (03-18-23 @ 04:00) (68 - 113)  BP: 114/68 (03-18-23 @ 04:00) (92/53 - 154/83)  BP(mean): 82 (03-18-23 @ 04:00) (65 - 114)  ABP: --  ABP(mean): --  RR: 11 (03-18-23 @ 04:00) (8 - 33)  SpO2: 96% (03-18-23 @ 04:00) (93% - 100%)  I/O Summary 24H    IN: 2163.3 mL / OUT: 3355 mL / NET: -1191.7 mL        MEDICATIONS  (STANDING):  chlorhexidine 2% Cloths 1 Application(s) Topical <User Schedule>  heparin  Infusion.  Unit(s)/Hr (11 mL/Hr) IV Continuous <Continuous>  levothyroxine Injectable 70 MICROGram(s) IV Push at bedtime  midodrine 20 milliGRAM(s) Oral every 8 hours  pantoprazole  Injectable 40 milliGRAM(s) IV Push daily  piperacillin/tazobactam IVPB.. 3.375 Gram(s) IV Intermittent every 12 hours    MEDICATIONS  (PRN):  heparin   Injectable 5000 Unit(s) IV Push every 6 hours PRN For aPTT less than 40  heparin   Injectable 2500 Unit(s) IV Push every 6 hours PRN For aPTT between 40 - 57  lidocaine   4% Patch 1 Patch Transdermal every 24 hours PRN back pain        LABS  CBC 03-18-23 @ 01:48                        9.1    9.15  )-----------( 392                   31.0       Hgb trend: 9.1 <-- , 9.7 <-- , 9.4 <-- , 10.2 <-- , 10.7 <--   WBC trend: 9.15 <-- , 10.25 <-- , 10.68 <-- , 16.36 <-- , 7.23 <--     CMP 03-18-23 @ 01:48    138  |  98  |  15  ----------------------------<  86  3.6   |  29  |  1.03    Ca    9.5      03-18-23 @ 01:48  Phos  3.0     03-18  Mg     1.70     03-18    TPro  6.6  /  Alb  3.0<L>  /  TBili  0.4  /  DBili  <0.2  /  AST  15  /  ALT  6   /  AlkPhos  71  03-17      Serum Cr trend: 1.03 <-- , 1.03 <-- , 1.27 <-- , 1.47 <-- , 1.52 <-- , 1.50 <-- , 1.25 <-- , 0.50 <--   PT/INR - ( 18 Mar 2023 01:48 )   PT: 17.0 sec;   INR: 1.46 ratio         PTT - ( 18 Mar 2023 01:48 ):80.5 sec

## 2023-03-18 NOTE — PROGRESS NOTE ADULT - PROBLEM SELECTOR PLAN 2
- admission UA+ with UCx growing E coli  - on zosyn as above  - consider transitioning to CTX tomorrow if afebrile x24h

## 2023-03-18 NOTE — PROGRESS NOTE ADULT - ASSESSMENT
Pt is an 87 yo F with PMH HTN, HLD, a-fib (xarelto), GERD, hypothyroidism, and OA p/w SOB and weakness. Course c/b aspiration event requiring intubation/sedation and MICU admission. Receiving zosyn for aspiration PNA. Extubated and evaluated by S+S with recommendation for pureed diet and moderately thick liquids; tube feeds stopped and NGT removed. PT recs AFTAB.

## 2023-03-19 LAB
ANION GAP SERPL CALC-SCNC: 11 MMOL/L — SIGNIFICANT CHANGE UP (ref 7–14)
BASOPHILS # BLD AUTO: 0.04 K/UL — SIGNIFICANT CHANGE UP (ref 0–0.2)
BASOPHILS NFR BLD AUTO: 0.6 % — SIGNIFICANT CHANGE UP (ref 0–2)
BUN SERPL-MCNC: 15 MG/DL — SIGNIFICANT CHANGE UP (ref 7–23)
CALCIUM SERPL-MCNC: 10.2 MG/DL — SIGNIFICANT CHANGE UP (ref 8.4–10.5)
CHLORIDE SERPL-SCNC: 100 MMOL/L — SIGNIFICANT CHANGE UP (ref 98–107)
CO2 SERPL-SCNC: 29 MMOL/L — SIGNIFICANT CHANGE UP (ref 22–31)
CREAT SERPL-MCNC: 0.92 MG/DL — SIGNIFICANT CHANGE UP (ref 0.5–1.3)
EGFR: 60 ML/MIN/1.73M2 — SIGNIFICANT CHANGE UP
EOSINOPHIL # BLD AUTO: 0.36 K/UL — SIGNIFICANT CHANGE UP (ref 0–0.5)
EOSINOPHIL NFR BLD AUTO: 5.8 % — SIGNIFICANT CHANGE UP (ref 0–6)
GLUCOSE SERPL-MCNC: 79 MG/DL — SIGNIFICANT CHANGE UP (ref 70–99)
HCT VFR BLD CALC: 31.7 % — LOW (ref 34.5–45)
HGB BLD-MCNC: 9.1 G/DL — LOW (ref 11.5–15.5)
IANC: 3.92 K/UL — SIGNIFICANT CHANGE UP (ref 1.8–7.4)
IMM GRANULOCYTES NFR BLD AUTO: 0.3 % — SIGNIFICANT CHANGE UP (ref 0–0.9)
LYMPHOCYTES # BLD AUTO: 1.39 K/UL — SIGNIFICANT CHANGE UP (ref 1–3.3)
LYMPHOCYTES # BLD AUTO: 22.5 % — SIGNIFICANT CHANGE UP (ref 13–44)
MAGNESIUM SERPL-MCNC: 1.8 MG/DL — SIGNIFICANT CHANGE UP (ref 1.6–2.6)
MCHC RBC-ENTMCNC: 24.8 PG — LOW (ref 27–34)
MCHC RBC-ENTMCNC: 28.7 GM/DL — LOW (ref 32–36)
MCV RBC AUTO: 86.4 FL — SIGNIFICANT CHANGE UP (ref 80–100)
MONOCYTES # BLD AUTO: 0.44 K/UL — SIGNIFICANT CHANGE UP (ref 0–0.9)
MONOCYTES NFR BLD AUTO: 7.1 % — SIGNIFICANT CHANGE UP (ref 2–14)
NEUTROPHILS # BLD AUTO: 3.92 K/UL — SIGNIFICANT CHANGE UP (ref 1.8–7.4)
NEUTROPHILS NFR BLD AUTO: 63.7 % — SIGNIFICANT CHANGE UP (ref 43–77)
NRBC # BLD: 0 /100 WBCS — SIGNIFICANT CHANGE UP (ref 0–0)
NRBC # FLD: 0 K/UL — SIGNIFICANT CHANGE UP (ref 0–0)
PHOSPHATE SERPL-MCNC: 2.8 MG/DL — SIGNIFICANT CHANGE UP (ref 2.5–4.5)
PLATELET # BLD AUTO: 320 K/UL — SIGNIFICANT CHANGE UP (ref 150–400)
POTASSIUM SERPL-MCNC: 3.6 MMOL/L — SIGNIFICANT CHANGE UP (ref 3.5–5.3)
POTASSIUM SERPL-SCNC: 3.6 MMOL/L — SIGNIFICANT CHANGE UP (ref 3.5–5.3)
RBC # BLD: 3.67 M/UL — LOW (ref 3.8–5.2)
RBC # FLD: 15.7 % — HIGH (ref 10.3–14.5)
SARS-COV-2 RNA SPEC QL NAA+PROBE: SIGNIFICANT CHANGE UP
SODIUM SERPL-SCNC: 140 MMOL/L — SIGNIFICANT CHANGE UP (ref 135–145)
WBC # BLD: 6.17 K/UL — SIGNIFICANT CHANGE UP (ref 3.8–10.5)
WBC # FLD AUTO: 6.17 K/UL — SIGNIFICANT CHANGE UP (ref 3.8–10.5)

## 2023-03-19 PROCEDURE — 99233 SBSQ HOSP IP/OBS HIGH 50: CPT

## 2023-03-19 RX ORDER — CEFTRIAXONE 500 MG/1
1000 INJECTION, POWDER, FOR SOLUTION INTRAMUSCULAR; INTRAVENOUS EVERY 24 HOURS
Refills: 0 | Status: COMPLETED | OUTPATIENT
Start: 2023-03-19 | End: 2023-03-20

## 2023-03-19 RX ORDER — METOPROLOL TARTRATE 50 MG
12.5 TABLET ORAL EVERY 12 HOURS
Refills: 0 | Status: DISCONTINUED | OUTPATIENT
Start: 2023-03-19 | End: 2023-03-23

## 2023-03-19 RX ADMIN — SENNA PLUS 2 TABLET(S): 8.6 TABLET ORAL at 22:09

## 2023-03-19 RX ADMIN — PANTOPRAZOLE SODIUM 40 MILLIGRAM(S): 20 TABLET, DELAYED RELEASE ORAL at 06:33

## 2023-03-19 RX ADMIN — RIVAROXABAN 20 MILLIGRAM(S): KIT at 17:43

## 2023-03-19 RX ADMIN — POLYETHYLENE GLYCOL 3350 17 GRAM(S): 17 POWDER, FOR SOLUTION ORAL at 11:07

## 2023-03-19 RX ADMIN — Medication 12.5 MILLIGRAM(S): at 22:09

## 2023-03-19 RX ADMIN — CEFTRIAXONE 100 MILLIGRAM(S): 500 INJECTION, POWDER, FOR SOLUTION INTRAMUSCULAR; INTRAVENOUS at 13:47

## 2023-03-19 RX ADMIN — PIPERACILLIN AND TAZOBACTAM 25 GRAM(S): 4; .5 INJECTION, POWDER, LYOPHILIZED, FOR SOLUTION INTRAVENOUS at 06:32

## 2023-03-19 RX ADMIN — Medication 12.5 MILLIGRAM(S): at 11:03

## 2023-03-19 RX ADMIN — Medication 100 MICROGRAM(S): at 06:33

## 2023-03-19 NOTE — PROGRESS NOTE ADULT - SUBJECTIVE AND OBJECTIVE BOX
[facility] Department of Hospital Medicine  Malinda Guthrie DO  Available on MS Teams  Pager: [phone]    Patient is a 88y old  Female who presents with a chief complaint of Shortness of breath (18 Mar 2023 10:13)    Subjective:      Vital Signs Last 24 Hrs  T(C): 36.8 (19 Mar 2023 05:00), Max: 37.6 (18 Mar 2023 17:00)  T(F): 98.2 (19 Mar 2023 05:00), Max: 99.6 (18 Mar 2023 17:00)  HR: 100 (19 Mar 2023 05:00) (72 - 101)  BP: 114/66 (19 Mar 2023 05:00) (113/60 - 134/66)  BP(mean): --  RR: 18 (19 Mar 2023 05:00) (17 - 18)  SpO2: 98% (19 Mar 2023 05:00) (95% - 100%)    Parameters below as of 19 Mar 2023 05:00  Patient On (Oxygen Delivery Method): room air    PHYSICAL EXAM:  Constitutional: elderly frail F; sitting comfortably in bed; NAD  Head: NC/AT  Eyes: PERRL, EOMI, anicteric sclera  ENT: no nasal discharge; MMM; NGT in place with tube feeds running  Neck: supple; no JVD  Respiratory: CTA B/L; no W/R/R; on RA without respiratory distress  Cardiac: +S1/S2; RRR; no M/R/G  Gastrointestinal: soft, NT/ND; no rebound or guarding; +BSx4  Extremities: WWP, no clubbing or cyanosis; no peripheral edema  Musculoskeletal: NROM x4; no joint swelling, tenderness or erythema  Vascular: 2+ radial, DP/PT pulses B/L  Dermatologic: skin warm, dry and intact; no rashes, wounds, or scars  Neurologic: AAOx2 (self, hospital); CNII-XII grossly intact; no focal deficits  Psychiatric: affect and characteristics of appearance, verbalizations, behaviors are appropriate    MEDICATIONS  (STANDING):  cefTRIAXone   IVPB 1000 milliGRAM(s) IV Intermittent every 24 hours  levothyroxine 100 MICROGram(s) Oral daily  midodrine 10 milliGRAM(s) Oral every 8 hours  pantoprazole    Tablet 40 milliGRAM(s) Oral before breakfast  polyethylene glycol 3350 17 Gram(s) Oral daily  rivaroxaban 20 milliGRAM(s) Oral with dinner  senna 2 Tablet(s) Oral at bedtime    MEDICATIONS  (PRN):  lidocaine   4% Patch 1 Patch Transdermal every 24 hours PRN back pain  ondansetron Injectable 4 milliGRAM(s) IV Push every 6 hours PRN Nausea and/or Vomiting    LABS:                        9.1    6.17  )-----------( 320      ( 19 Mar 2023 04:00 )             31.7     03-19    140  |  100  |  15  ----------------------------<  79  3.6   |  29  |  0.92    Ca    10.2      19 Mar 2023 04:00  Phos  2.8     03-19  Mg     1.80     03-19    TPro  6.6  /  Alb  3.0<L>  /  TBili  0.4  /  DBili  <0.2  /  AST  15  /  ALT  6   /  AlkPhos  71  03-17    PT/INR - ( 18 Mar 2023 01:48 )   PT: 17.0 sec;   INR: 1.46 ratio         PTT - ( 18 Mar 2023 01:48 )  PTT:80.5 sec    CAPILLARY BLOOD GLUCOSE      POCT Blood Glucose.: 99 mg/dL (18 Mar 2023 09:15)      RADIOLOGY & ADDITIONAL TESTS: Reviewed.     [facility] Department of Hospital Medicine  Malinda Guthrie DO  Available on MS Teams  Pager: [phone]    Patient is a 88y old  Female who presents with a chief complaint of Shortness of breath (18 Mar 2023 10:13)    Subjective:  Pt seen and examined at bedside in no acute distress, feeling significantly better than yesterday.  bedside agrees and is pleased with improvement. Tolerating pureed diet. Denies acute complaints. Passed TOV.     Vital Signs Last 24 Hrs  T(C): 36.8 (19 Mar 2023 05:00), Max: 37.6 (18 Mar 2023 17:00)  T(F): 98.2 (19 Mar 2023 05:00), Max: 99.6 (18 Mar 2023 17:00)  HR: 100 (19 Mar 2023 05:00) (72 - 101)  BP: 114/66 (19 Mar 2023 05:00) (113/60 - 134/66)  BP(mean): --  RR: 18 (19 Mar 2023 05:00) (17 - 18)  SpO2: 98% (19 Mar 2023 05:00) (95% - 100%)    Parameters below as of 19 Mar 2023 05:00  Patient On (Oxygen Delivery Method): room air    PHYSICAL EXAM:  Constitutional: elderly F; sitting comfortably in bed; NAD; in good spirits  Head: NC/AT  Eyes: PERRL, EOMI, anicteric sclera  ENT: no nasal discharge; MMM   Neck: supple; no JVD  Respiratory: CTA B/L; no W/R/R; on RA without respiratory distress  Cardiac: +S1/S2; RRR; no M/R/G  Gastrointestinal: soft, NT/ND; no rebound or guarding; +BSx4  Extremities: WWP, no clubbing or cyanosis; no peripheral edema  Musculoskeletal: NROM x4; no joint swelling, tenderness or erythema  Vascular: 2+ radial, DP/PT pulses B/L  Dermatologic: skin warm, dry and intact; no rashes, wounds, or scars  Neurologic: AAOx3 (self, year, hospital); CNII-XII grossly intact; no focal deficits  Psychiatric: affect and characteristics of appearance, verbalizations, behaviors are appropriate    MEDICATIONS  (STANDING):  cefTRIAXone   IVPB 1000 milliGRAM(s) IV Intermittent every 24 hours  levothyroxine 100 MICROGram(s) Oral daily  midodrine 10 milliGRAM(s) Oral every 8 hours  pantoprazole    Tablet 40 milliGRAM(s) Oral before breakfast  polyethylene glycol 3350 17 Gram(s) Oral daily  rivaroxaban 20 milliGRAM(s) Oral with dinner  senna 2 Tablet(s) Oral at bedtime    MEDICATIONS  (PRN):  lidocaine   4% Patch 1 Patch Transdermal every 24 hours PRN back pain  ondansetron Injectable 4 milliGRAM(s) IV Push every 6 hours PRN Nausea and/or Vomiting    LABS:                        9.1    6.17  )-----------( 320      ( 19 Mar 2023 04:00 )             31.7     03-19    140  |  100  |  15  ----------------------------<  79  3.6   |  29  |  0.92    Ca    10.2      19 Mar 2023 04:00  Phos  2.8     03-19  Mg     1.80     03-19    TPro  6.6  /  Alb  3.0<L>  /  TBili  0.4  /  DBili  <0.2  /  AST  15  /  ALT  6   /  AlkPhos  71  03-17    PT/INR - ( 18 Mar 2023 01:48 )   PT: 17.0 sec;   INR: 1.46 ratio         PTT - ( 18 Mar 2023 01:48 )  PTT:80.5 sec    CAPILLARY BLOOD GLUCOSE      POCT Blood Glucose.: 99 mg/dL (18 Mar 2023 09:15)      RADIOLOGY & ADDITIONAL TESTS: Reviewed.

## 2023-03-19 NOTE — PROGRESS NOTE ADULT - PROBLEM SELECTOR PLAN 1
- pt with aspiration event outpt 1wk prior to admission with subsequent development of productive cough; saw PCP with CXR c/f aspiration PNA and referred to ER   - course c/b aspiration event with RRT -- not protecting airway and was I&S and transferred to MICU; extubated 3/17 to venturi mask and now on RA  - tracheal sputum cx neg, MRSA neg  - S+S consulted -- recommending pureed diet with moderately thick liquids  - f/u XR cinesophagogram  - aspiration precautions  - ?consider glycopyrolate if secretions do not improve -- suspect component of throat irritation from recent intubation; can call ENT if not improved in the next 24h - pt with aspiration event outpt 1wk prior to admission with subsequent development of productive cough; saw PCP with CXR c/f aspiration PNA and referred to ER   - course c/b aspiration event with RRT -- not protecting airway and was I&S and transferred to MICU; extubated 3/17 to venturi mask and now on RA  - tracheal sputum cx neg, MRSA neg  - S+S consulted -- recommending pureed diet with moderately thick liquids  - f/u XR cinesophagogram  - aspiration precautions

## 2023-03-19 NOTE — PROGRESS NOTE ADULT - PROBLEM SELECTOR PLAN 2
- admission UA+ with UCx growing E coli  - zosyn 3/14-18  - switch to CTX today -- will plan for 2 more days for 5d total course of abx   - vern dc'd --- spontaneously voided but with some retention; will continue bladder scan q8h - admission UA+ with UCx growing E coli  - zosyn 3/14-18  - switch to CTX today -- will plan for 2 more days for 5d total course of abx   - vern burgess --- spontaneously voiding now

## 2023-03-19 NOTE — PROGRESS NOTE ADULT - SUBJECTIVE AND OBJECTIVE BOX
Date of service 3/19/23    chief complaint: SOB    extended hpi: 88-year-old female with a pmh of A-fib on Xarelto, GERD, hypothyroid, hypertension, hyperlipidemia, arthritis who inially presented with complaints of shortness of breath for a few weeks and generalized weakness. She was admitted for Aspiration PNA, had rapid response called after patient was given water to drink as part of dysphagia screen found to be gurgling and then subsequently became unresponsive. After intubation patient hypotensive requiring pressors.     No chest pain or sob     Review of Systems:   Constitutional: [ ] fevers, [ ] chills.   Skin: [ ] dry skin. [ ] rashes.  Psychiatric: [ ] depression, [ ] anxiety.   Gastrointestinal: [ ] BRBPR, [ ] melena.   Neurological: [ ] confusion. [ ] seizures. [ ] shuffling gait.   Ears,Nose,Mouth and Throat: [ ] ear pain [ ] sore throat.   Eyes: [ ] diplopia.   Respiratory: [ ] hemoptysis. [ ] shortness of breath  Cardiovascular: See HPI above  Hematologic/Lymphatic: [ ] anemia. [ ] painful nodes. [ ] prolonged bleeding.   Genitourinary: [ ] hematuria. [ ] flank pain.   Endocrine: [ ] significant change in weight. [ ] intolerance to heat and cold.     Review of systems [ ] otherwise negative, [x ] otherwise unable to obtain    FH: no family history of sudden cardiac death in first degree relatives    SH: [ ] tobacco, [ ] alcohol, [ ] drugs             cefTRIAXone   IVPB 1000 milliGRAM(s) IV Intermittent every 24 hours  levothyroxine 100 MICROGram(s) Oral daily  lidocaine   4% Patch 1 Patch Transdermal every 24 hours PRN  midodrine 10 milliGRAM(s) Oral every 8 hours  ondansetron Injectable 4 milliGRAM(s) IV Push every 6 hours PRN  pantoprazole    Tablet 40 milliGRAM(s) Oral before breakfast  polyethylene glycol 3350 17 Gram(s) Oral daily  rivaroxaban 20 milliGRAM(s) Oral with dinner  senna 2 Tablet(s) Oral at bedtime                            9.1    6.17  )-----------( 320      ( 19 Mar 2023 04:00 )             31.7       Hemoglobin: 9.1 g/dL (03-19 @ 04:00)  Hemoglobin: 9.1 g/dL (03-18 @ 01:48)  Hemoglobin: 9.7 g/dL (03-17 @ 16:30)  Hemoglobin: 9.4 g/dL (03-17 @ 00:25)  Hemoglobin: 10.2 g/dL (03-16 @ 02:35)      03-19    140  |  100  |  15  ----------------------------<  79  3.6   |  29  |  0.92    Ca    10.2      19 Mar 2023 04:00  Phos  2.8     03-19  Mg     1.80     03-19    TPro  6.6  /  Alb  3.0<L>  /  TBili  0.4  /  DBili  <0.2  /  AST  15  /  ALT  6   /  AlkPhos  71  03-17    Creatinine Trend: 0.92<--, 1.03<--, 1.03<--, 1.27<--, 1.47<--, 1.52<--    COAGS:     CARDIAC MARKERS ( 17 Mar 2023 16:30 )  x     / x     / 48 U/L / x     / x      CARDIAC MARKERS ( 17 Mar 2023 00:25 )  x     / x     / 12 U/L / x     / x      CARDIAC MARKERS ( 16 Mar 2023 12:01 )  x     / x     / 20 U/L / x     / 1.6 ng/mL        T(C): 36.8 (03-19-23 @ 05:00), Max: 37.6 (03-18-23 @ 17:00)  HR: 100 (03-19-23 @ 05:00) (72 - 101)  BP: 114/66 (03-19-23 @ 05:00) (113/60 - 134/66)  RR: 18 (03-19-23 @ 05:00) (17 - 18)  SpO2: 98% (03-19-23 @ 05:00) (95% - 100%)  Wt(kg): --    I&O's Summary    18 Mar 2023 07:01  -  19 Mar 2023 07:00  --------------------------------------------------------  IN: 0 mL / OUT: 1450 mL / NET: -1450 mL      General: awake and alert, cooperative.  speaks only Sudanese, daughter translates.  Head: Normocephalic and atraumatic.   Neck: No JVD. No bruits. Supple. Does not appear to be enlarged.   Cardiovascular: + S1,S2 ; RRR Soft systolic murmur at the left lower sternal border. No rubs noted.    Lungs: coarse BS BL  Abdomen: + BS, soft. Non tender. Non distended. No rebound. No guarding.   Extremities: No clubbing/cyanosis/edema.   Neurologic: unable to assess  Skin: Warm and moist. The patient's skin has normal elasticity and good skin turgor.   Psychiatric: unable to assess  Musculoskeletal: unable to assess    DATA    tele- SR ST      < from: Transthoracic Echocardiogram (03.15.23 @ 13:49) >  CONCLUSIONS:  1. Mitral annular calcification, otherwise normal mitral  valve. Mild-moderate mitral regurgitation.  2. Calcified trileaflet aortic valve with normal opening.  Mild aortic regurgitation.  3. Normal left ventricular internal dimensions and wall  thicknesses.  4. Endocardium not well visualized; grossly moderate global  left ventricular systolic dysfunction.  5. Mild diastolic dysfunction (Stage I).  6. The right ventricle is not well visualized; grossly  normal right ventricular systolic function.  ------------------------------------------------------------------------  Confirmed on  3/15/2023 - 15:05:45 by Kaveh Jimenez M.D.,  [facility], Critical access hospital  -----------------------    < end of copied text >    ASSESSMENT/PLAN: Pt is a 88-year-old female with a pmh of A-fib on Xarelto, GERD, hypothyroid, hypertension, hyperlipidemia, arthritis who inially presented with complaints of shortness of breath for a few weeks and generalized weakness.  She was admitted for Aspiration PNA, had rapid response called after patient was given water to drink as part of dysphagia screen found to be gurgling and then subsequently became unresponsive. After intubation patient hypotensive requiring pressor support.      1. Shock  - likely septic due to asp PNA and +UA  - previous normal LVEF on echo, now with LVEF 40%--suspect due to septic shock  -repeat echo next week to re eval.  - off pressors > 48 hr , bp stable on proamatine    2. PAF  -  stable NSR   -continue Heparin Gtt while Xarelto on hold    3. Elevated Trop T  --in the setting of PANCHITO, sepsis  --not c/w ACS  --repeat Echo next week to re eval LV function

## 2023-03-19 NOTE — PROVIDER CONTACT NOTE (OTHER) - BACKGROUND
S/p RRT.
Admitting dx: Pneumonitis due to inhalation food. Dx: HF, HLD, HTN, AFIB, UTI, aspiration PNA.
Admitting dx: Pneumonitis due to inhalation food. Dx: HF, HLD, HTN, AFIB, UTI, aspiration PNA.

## 2023-03-19 NOTE — PROVIDER CONTACT NOTE (OTHER) - RECOMMENDATIONS
ACP made aware. Will continue to monitor pt. at this time.
ACP made aware. As per ACP, no straight cath at this time- repeat BS in 2 hrs.
PA made aware. Awaiting for new orders.

## 2023-03-19 NOTE — PROGRESS NOTE ADULT - PROBLEM SELECTOR PLAN 4
- home med: xarelto 20mg daily  - on hep gtt while NPO  - now tolerating PO -- c/w xarelto home med - home med: xarelto 20mg daily  - on hep gtt while NPO  - now tolerating PO -- c/w xarelto home med  - given tachycardia today, will restart BB -- lopressor 12.5mg BID  - will continue midodrine despite initiation of BB and hopefully dc in coming days; SBP stable in 110s at present

## 2023-03-20 LAB
ANION GAP SERPL CALC-SCNC: 11 MMOL/L — SIGNIFICANT CHANGE UP (ref 7–14)
BASOPHILS # BLD AUTO: 0.04 K/UL — SIGNIFICANT CHANGE UP (ref 0–0.2)
BASOPHILS NFR BLD AUTO: 0.6 % — SIGNIFICANT CHANGE UP (ref 0–2)
BUN SERPL-MCNC: 12 MG/DL — SIGNIFICANT CHANGE UP (ref 7–23)
CALCIUM SERPL-MCNC: 10.6 MG/DL — HIGH (ref 8.4–10.5)
CHLORIDE SERPL-SCNC: 100 MMOL/L — SIGNIFICANT CHANGE UP (ref 98–107)
CO2 SERPL-SCNC: 29 MMOL/L — SIGNIFICANT CHANGE UP (ref 22–31)
CREAT SERPL-MCNC: 0.72 MG/DL — SIGNIFICANT CHANGE UP (ref 0.5–1.3)
CULTURE RESULTS: SIGNIFICANT CHANGE UP
CULTURE RESULTS: SIGNIFICANT CHANGE UP
EGFR: 80 ML/MIN/1.73M2 — SIGNIFICANT CHANGE UP
EOSINOPHIL # BLD AUTO: 0.21 K/UL — SIGNIFICANT CHANGE UP (ref 0–0.5)
EOSINOPHIL NFR BLD AUTO: 3.2 % — SIGNIFICANT CHANGE UP (ref 0–6)
GLUCOSE SERPL-MCNC: 95 MG/DL — SIGNIFICANT CHANGE UP (ref 70–99)
HCT VFR BLD CALC: 31.9 % — LOW (ref 34.5–45)
HGB BLD-MCNC: 9.4 G/DL — LOW (ref 11.5–15.5)
IANC: 4.51 K/UL — SIGNIFICANT CHANGE UP (ref 1.8–7.4)
IMM GRANULOCYTES NFR BLD AUTO: 0.3 % — SIGNIFICANT CHANGE UP (ref 0–0.9)
LYMPHOCYTES # BLD AUTO: 1.27 K/UL — SIGNIFICANT CHANGE UP (ref 1–3.3)
LYMPHOCYTES # BLD AUTO: 19.1 % — SIGNIFICANT CHANGE UP (ref 13–44)
MAGNESIUM SERPL-MCNC: 1.7 MG/DL — SIGNIFICANT CHANGE UP (ref 1.6–2.6)
MCHC RBC-ENTMCNC: 25.4 PG — LOW (ref 27–34)
MCHC RBC-ENTMCNC: 29.5 GM/DL — LOW (ref 32–36)
MCV RBC AUTO: 86.2 FL — SIGNIFICANT CHANGE UP (ref 80–100)
MONOCYTES # BLD AUTO: 0.6 K/UL — SIGNIFICANT CHANGE UP (ref 0–0.9)
MONOCYTES NFR BLD AUTO: 9 % — SIGNIFICANT CHANGE UP (ref 2–14)
NEUTROPHILS # BLD AUTO: 4.51 K/UL — SIGNIFICANT CHANGE UP (ref 1.8–7.4)
NEUTROPHILS NFR BLD AUTO: 67.8 % — SIGNIFICANT CHANGE UP (ref 43–77)
NRBC # BLD: 0 /100 WBCS — SIGNIFICANT CHANGE UP (ref 0–0)
NRBC # FLD: 0 K/UL — SIGNIFICANT CHANGE UP (ref 0–0)
PHOSPHATE SERPL-MCNC: 2.7 MG/DL — SIGNIFICANT CHANGE UP (ref 2.5–4.5)
PLATELET # BLD AUTO: 320 K/UL — SIGNIFICANT CHANGE UP (ref 150–400)
POTASSIUM SERPL-MCNC: 3.3 MMOL/L — LOW (ref 3.5–5.3)
POTASSIUM SERPL-SCNC: 3.3 MMOL/L — LOW (ref 3.5–5.3)
RBC # BLD: 3.7 M/UL — LOW (ref 3.8–5.2)
RBC # FLD: 15.5 % — HIGH (ref 10.3–14.5)
SODIUM SERPL-SCNC: 140 MMOL/L — SIGNIFICANT CHANGE UP (ref 135–145)
SPECIMEN SOURCE: SIGNIFICANT CHANGE UP
SPECIMEN SOURCE: SIGNIFICANT CHANGE UP
WBC # BLD: 6.65 K/UL — SIGNIFICANT CHANGE UP (ref 3.8–10.5)
WBC # FLD AUTO: 6.65 K/UL — SIGNIFICANT CHANGE UP (ref 3.8–10.5)

## 2023-03-20 PROCEDURE — 74230 X-RAY XM SWLNG FUNCJ C+: CPT | Mod: 26

## 2023-03-20 PROCEDURE — 99233 SBSQ HOSP IP/OBS HIGH 50: CPT

## 2023-03-20 RX ORDER — MAGNESIUM SULFATE 500 MG/ML
1 VIAL (ML) INJECTION ONCE
Refills: 0 | Status: COMPLETED | OUTPATIENT
Start: 2023-03-20 | End: 2023-03-20

## 2023-03-20 RX ORDER — ACETAMINOPHEN 500 MG
650 TABLET ORAL EVERY 6 HOURS
Refills: 0 | Status: DISCONTINUED | OUTPATIENT
Start: 2023-03-20 | End: 2023-03-23

## 2023-03-20 RX ORDER — POTASSIUM CHLORIDE 20 MEQ
40 PACKET (EA) ORAL ONCE
Refills: 0 | Status: DISCONTINUED | OUTPATIENT
Start: 2023-03-20 | End: 2023-03-20

## 2023-03-20 RX ORDER — POTASSIUM CHLORIDE 20 MEQ
40 PACKET (EA) ORAL ONCE
Refills: 0 | Status: COMPLETED | OUTPATIENT
Start: 2023-03-20 | End: 2023-03-20

## 2023-03-20 RX ADMIN — Medication 650 MILLIGRAM(S): at 09:20

## 2023-03-20 RX ADMIN — Medication 12.5 MILLIGRAM(S): at 12:19

## 2023-03-20 RX ADMIN — CEFTRIAXONE 100 MILLIGRAM(S): 500 INJECTION, POWDER, FOR SOLUTION INTRAMUSCULAR; INTRAVENOUS at 13:46

## 2023-03-20 RX ADMIN — RIVAROXABAN 20 MILLIGRAM(S): KIT at 18:15

## 2023-03-20 RX ADMIN — Medication 100 GRAM(S): at 11:04

## 2023-03-20 RX ADMIN — POLYETHYLENE GLYCOL 3350 17 GRAM(S): 17 POWDER, FOR SOLUTION ORAL at 12:20

## 2023-03-20 RX ADMIN — PANTOPRAZOLE SODIUM 40 MILLIGRAM(S): 20 TABLET, DELAYED RELEASE ORAL at 06:04

## 2023-03-20 RX ADMIN — Medication 100 MICROGRAM(S): at 06:04

## 2023-03-20 RX ADMIN — Medication 40 MILLIEQUIVALENT(S): at 08:17

## 2023-03-20 RX ADMIN — Medication 650 MILLIGRAM(S): at 08:20

## 2023-03-20 RX ADMIN — SENNA PLUS 2 TABLET(S): 8.6 TABLET ORAL at 21:24

## 2023-03-20 NOTE — PROGRESS NOTE ADULT - PROBLEM SELECTOR PLAN 1
- pt with aspiration event outpt 1wk prior to admission with subsequent development of productive cough; saw PCP with CXR c/f aspiration PNA and referred to ER   - course c/b aspiration event with RRT -- not protecting airway and was I&S and transferred to MICU; extubated 3/17 to venturi mask and now weaned of oxygen  - tracheal sputum cx neg, MRSA neg  - S+S consulted -- recommending pureed diet with moderately thick liquids  - f/u XR cinesophagogram  - aspiration precautions

## 2023-03-20 NOTE — ADVANCED PRACTICE NURSE CONSULT - RECOMMEDATIONS
Topical recommendations:     Sacrum to bilateral buttocks - Cleanse with skin cleanser, pat dry. Apply silicone foam with border over bony prominence for protection, change every 3 days and PRN if soiled. After foam is in place, apply Prince moisture barrier cream twice a day to buttocks and PRN with incontinent episodes.     Continue low air loss bed therapy, continue heel elevation with complete CAIR boots, continue to turn & reposition per protocol, soft pillow between bony prominences, continue moisture management with barrier creams & single breathable pad, continue measures to decrease friction/shear/pressure.     Plan discussed with patient, patient's family and primary RN Kim Lombardi at bedside.     Please contact Wound/Ostomy Care Service Line if we can be of further assistance (ext [phone]).

## 2023-03-20 NOTE — PROGRESS NOTE ADULT - PROBLEM SELECTOR PLAN 8
- F: none  - E: replete K<4, Mg<2  - N: pureed diet, moderately thick liquids  - D: xarelto  - G: protonix    code: full  dispo: pending medical optimization; PT recs AFTAB    -Hypokalemia- replete k

## 2023-03-20 NOTE — SWALLOW VFSS/MBS ASSESSMENT ADULT - ADDITIONAL RECOMMENDATIONS
This department to follow up as schedule permits to assess for diet tolerance/advancement. This department to follow up as schedule permits to assess for diet tolerance/advancement. Medical team further advised to reconsult if there is a change in medical status and/or observed change in patient's tolerance of recommended PO diet. This department to follow up as schedule permits to assess for diet tolerance. Medical team further advised to reconsult if there is a change in medical status and/or observed change in patient's tolerance of recommended PO diet.

## 2023-03-20 NOTE — SWALLOW VFSS/MBS ASSESSMENT ADULT - RECOMMENDED CONSISTENCY
1) Puree with Moderately Thick Liquids  2) Aspiration and Reflux Precautions 1) Puree with Moderately Thick Liquids  2) Swallowing Guidelines: Seated upright during mealtimes and 30 min post, Encourage Small Sips and Bites, Maintain Oral Hygiene.  3) Aspiration and Reflux Precautions  4) Crush PO pills/tablets if not contraindicated and given with applesauce

## 2023-03-20 NOTE — PROGRESS NOTE ADULT - SUBJECTIVE AND OBJECTIVE BOX
Date of service 3/20/23    chief complaint: SOB    extended hpi: 88-year-old female with a pmh of A-fib on Xarelto, GERD, hypothyroid, hypertension, hyperlipidemia, arthritis who inially presented with complaints of shortness of breath for a few weeks and generalized weakness. She was admitted for Aspiration PNA, had rapid response called after patient was given water to drink as part of dysphagia screen found to be gurgling and then subsequently became unresponsive. After intubation patient hypotensive requiring pressors.    still has cough, no other events noted, no chest pain or SOB    Review of Systems:   Constitutional: [ ] fevers, [ ] chills.   Skin: [ ] dry skin. [ ] rashes.  Psychiatric: [ ] depression, [ ] anxiety.   Gastrointestinal: [ ] BRBPR, [ ] melena.   Neurological: [ ] confusion. [ ] seizures. [ ] shuffling gait.   Ears,Nose,Mouth and Throat: [ ] ear pain [ ] sore throat.   Eyes: [ ] diplopia.   Respiratory: [ ] hemoptysis. [ ] shortness of breath  Cardiovascular: See HPI above  Hematologic/Lymphatic: [ ] anemia. [ ] painful nodes. [ ] prolonged bleeding.   Genitourinary: [ ] hematuria. [ ] flank pain.   Endocrine: [ ] significant change in weight. [ ] intolerance to heat and cold.     Review of systems [ ] otherwise negative, [x ] otherwise unable to obtain    FH: no family history of sudden cardiac death in first degree relatives    SH: [ ] tobacco, [ ] alcohol, [ ] drugs    acetaminophen     Tablet .. 650 milliGRAM(s) Oral every 6 hours PRN  levothyroxine 100 MICROGram(s) Oral daily  lidocaine   4% Patch 1 Patch Transdermal every 24 hours PRN  metoprolol tartrate 12.5 milliGRAM(s) Oral every 12 hours  midodrine 10 milliGRAM(s) Oral every 8 hours  ondansetron Injectable 4 milliGRAM(s) IV Push every 6 hours PRN  pantoprazole    Tablet 40 milliGRAM(s) Oral before breakfast  polyethylene glycol 3350 17 Gram(s) Oral daily  rivaroxaban 20 milliGRAM(s) Oral with dinner  senna 2 Tablet(s) Oral at bedtime                            9.4    6.65  )-----------( 320      ( 20 Mar 2023 05:21 )             31.9       140  |  100  |  12  ----------------------------<  95  3.3<L>   |  29  |  0.72    Ca    10.6<H>      20 Mar 2023 05:21  Phos  2.7     03-20  Mg     1.70     03-20      T(C): 36.8 (03-20-23 @ 14:00), Max: 36.8 (03-19-23 @ 22:00)  HR: 95 (03-20-23 @ 14:00) (80 - 95)  BP: 130/60 (03-20-23 @ 14:00) (130/60 - 158/66)  RR: 17 (03-20-23 @ 14:00) (17 - 18)  SpO2: 95% (03-20-23 @ 14:00) (94% - 95%)  Wt(kg): --    General: awake and alert, cooperative.  speaks only Arabic, daughter translates.  Head: Normocephalic and atraumatic.   Neck: No JVD. No bruits. Supple. Does not appear to be enlarged.   Cardiovascular: + S1,S2 ; RRR Soft systolic murmur at the left lower sternal border. No rubs noted.    Lungs: coarse BS BL  Abdomen: + BS, soft. Non tender. Non distended. No rebound. No guarding.   Extremities: No clubbing/cyanosis/edema.   Neurologic: unable to assess  Skin: Warm and moist. The patient's skin has normal elasticity and good skin turgor.   Psychiatric: unable to assess  Musculoskeletal: unable to assess    DATA    tele- SR ST      < from: Transthoracic Echocardiogram (03.15.23 @ 13:49) >  CONCLUSIONS:  1. Mitral annular calcification, otherwise normal mitral  valve. Mild-moderate mitral regurgitation.  2. Calcified trileaflet aortic valve with normal opening.  Mild aortic regurgitation.  3. Normal left ventricular internal dimensions and wall  thicknesses.  4. Endocardium not well visualized; grossly moderate global  left ventricular systolic dysfunction.  5. Mild diastolic dysfunction (Stage I).  6. The right ventricle is not well visualized; grossly  normal right ventricular systolic function.  ------------------------------------------------------------------------  Confirmed on  3/15/2023 - 15:05:45 by Kaveh Jimenez M.D.,  [facility], [facility]  -----------------------    < end of copied text >    ASSESSMENT/PLAN: Pt is a 88-year-old female with a pmh of A-fib on Xarelto, GERD, hypothyroid, hypertension, hyperlipidemia, arthritis who inially presented with complaints of shortness of breath for a few weeks and generalized weakness.  She was admitted for Aspiration PNA, had rapid response called after patient was given water to drink as part of dysphagia screen found to be gurgling and then subsequently became unresponsive. After intubation patient hypotensive requiring pressor support.      1. Shock  - likely septic due to asp PNA and +UA  - previous normal LVEF on echo, now with LVEF 40%--suspect due to septic shock  -repeat echo next week to re eval LV function 3/22      2. PAF  - in sinus   -Xarelto resumed    3. Elevated Trop T  --in the setting of PANCHITO, sepsis  --not c/w ACS  --repeat Echo next week as noted above to re eval LV function    Thank you for allowing us to participate in the care of our mutual patient.  Please do not hesitate to call with any questions.   OP F/u in the office with Dr oFrbes after DC, [phone]

## 2023-03-20 NOTE — PROGRESS NOTE ADULT - PROBLEM SELECTOR PLAN 4
- home med: xarelto 20mg daily  - now tolerating PO -- c/w xarelto home med  - cont lopressor 12.5mg BID  - will continue midodrine despite initiation of BB and hopefully dc in coming days; SBP stable in 110s at present

## 2023-03-20 NOTE — PROGRESS NOTE ADULT - SUBJECTIVE AND OBJECTIVE BOX
Patient is a 88y old  Female who presents with a chief complaint of Shortness of breath (19 Mar 2023 09:47)      SUBJECTIVE / OVERNIGHT EVENTS: Pt seen and examined at 12:05pm, no overnight events, has some cough, no sob, or any other complaints. Son John and daughter in law at bedside. No  other new issues reported.    MEDICATIONS  (STANDING):  levothyroxine 100 MICROGram(s) Oral daily  metoprolol tartrate 12.5 milliGRAM(s) Oral every 12 hours  midodrine 10 milliGRAM(s) Oral every 8 hours  pantoprazole    Tablet 40 milliGRAM(s) Oral before breakfast  polyethylene glycol 3350 17 Gram(s) Oral daily  rivaroxaban 20 milliGRAM(s) Oral with dinner  senna 2 Tablet(s) Oral at bedtime    MEDICATIONS  (PRN):  acetaminophen     Tablet .. 650 milliGRAM(s) Oral every 6 hours PRN Mild Pain (1 - 3)  lidocaine   4% Patch 1 Patch Transdermal every 24 hours PRN back pain  ondansetron Injectable 4 milliGRAM(s) IV Push every 6 hours PRN Nausea and/or Vomiting      Vital Signs Last 24 Hrs  T(C): 36.8 (20 Mar 2023 14:00), Max: 36.8 (19 Mar 2023 22:00)  T(F): 98.2 (20 Mar 2023 14:00), Max: 98.2 (19 Mar 2023 22:00)  HR: 95 (20 Mar 2023 14:00) (80 - 95)  BP: 130/60 (20 Mar 2023 14:00) (130/60 - 158/66)  BP(mean): --  RR: 17 (20 Mar 2023 14:00) (17 - 18)  SpO2: 95% (20 Mar 2023 14:00) (94% - 95%)    Parameters below as of 20 Mar 2023 14:00  Patient On (Oxygen Delivery Method): room air      CAPILLARY BLOOD GLUCOSE        I&O's Summary    19 Mar 2023 07:01  -  20 Mar 2023 07:00  --------------------------------------------------------  IN: 480 mL / OUT: 1200 mL / NET: -720 mL        PHYSICAL EXAM:  GENERAL: NAD, thinly built female  CHEST/LUNG: Clear to auscultation bilaterally; No wheeze  HEART: Regular rate and rhythm  ABDOMEN: Soft, Nontender, Nondistended  EXTREMITIES: no LE edema  PSYCH: Calm  NEUROLOGY: AA answers all questions appropriately  SKIN: No rashes or lesions    LABS:                        9.4    6.65  )-----------( 320      ( 20 Mar 2023 05:21 )             31.9     03-20    140  |  100  |  12  ----------------------------<  95  3.3<L>   |  29  |  0.72    Ca    10.6<H>      20 Mar 2023 05:21  Phos  2.7     03-20  Mg     1.70     03-20                RADIOLOGY & ADDITIONAL TESTS:    Imaging Personally Reviewed:    Consultant(s) Notes Reviewed:      Care Discussed with Consultants/Other Providers:

## 2023-03-20 NOTE — SWALLOW VFSS/MBS ASSESSMENT ADULT - COMMENTS
Progress Note- Hospitalist 3/19: "Pt is an 87 yo F with PMH HTN, HLD, a-fib (xarelto), GERD, hypothyroidism, and OA p/w SOB and weakness. Course c/b aspiration event requiring intubation/sedation and MICU admission. Receiving zosyn for aspiration PNA. Extubated and evaluated by S+S with recommendation for pureed diet and moderately thick liquids; tube feeds stopped and NGT removed. PT recs AFTAB."     Cardiology 3/19: "ASSESSMENT/PLAN: Pt is a 88-year-old female with a pmh of A-fib on Xarelto, GERD, hypothyroid, hypertension, hyperlipidemia, arthritis who inially presented with complaints of shortness of breath for a few weeks and generalized weakness.  She was admitted for Aspiration PNA, had rapid response called after patient was given water to drink as part of dysphagia screen found to be gurgling and then subsequently became unresponsive. After intubation patient hypotensive requiring pressor support."    Patient is known to this department, seen on 3/18 for a clinical swallow evaluation with recommendations of Puree with Moderately Thick Liquids. (see full report for details)     Patient received in Radiology Suite this AM for a cinesophragram accompanied by the patient's daughter. Patient is Serbian speaking, [employer] utilized (Silk ID#[id number]) for translation. Patient transferred to a specialized seating unit with lateral view projection. Patient noted with nonproductive cough prior to administration of PO trials.

## 2023-03-20 NOTE — ADVANCED PRACTICE NURSE CONSULT - ASSESSMENT
General: A&Ox4, Kitt[place] speaking, family at bedside able to translate. Bedbound, turned with 2 assist, incontinent of urine and pasty stool, incontinence care provided with RN at bedside, female external urine management system replaced. Skin warm, dry with increased moisture in intertriginous folds, adequate skin turgor, bilateral heels boggy to palpation.     Sacrum to bilateral buttocks - incontinence and moisture associated dermatitis as evidenced by erythema, hyperpigmentation, and increased moisture. No evidence of candidiasis, no open ulcerations noted.     Educated family at bedside on importance of frequent turning and repositioning and offloading heels while in bed. Family verbalized understanding. 
Right arm cleansed with CHG. Under ultrasound guidance, placed Arrow Endurance Extended Dwell Peripheral Catheter System 20G / 6cm into Right Basilic Vein. Brisk  blood return, flushed with 20mls normal saline. Minimal blood loss. Patient tolerated procedure well. CHG dressing placed. All sharps accounted for.

## 2023-03-20 NOTE — ADVANCED PRACTICE NURSE CONSULT - REASON FOR CONSULT
Arrow placement
Attempted to see patient on Wound Care Rounds, patient off unit at this time. Will follow up with patient for wound care recommendations. 
Patient seen on skin care rounds after wound care referral received for assessment of skin impairment and recommendations of topical management of sacral stage 1 PI vs DTPI. As per H&P patient is a 88-year-old female with past medical history of A-fib on Xarelto, GERD, hypothyroid, hypertension, hyperlipidemia, arthritis presented with shortness of breath likely secondary to aspiration pneumonia. Intubated 3/15 for airway protection, extubated 3/18, s/p cinesophagogram 3/20, pending results. Chart reviewed: WBC 6.65, H/H 9.4/31.9, INR 1.46 , Platelets 320, hA1c 5.0 BMI 26.1, erik 13.

## 2023-03-20 NOTE — PROGRESS NOTE ADULT - PROBLEM SELECTOR PLAN 2
- admission UA+ with UCx growing E coli  - zosyn 3/14-18  - cont CTX for a  5d total course of abx   - vern dc'd --- spontaneously voiding now

## 2023-03-20 NOTE — SWALLOW VFSS/MBS ASSESSMENT ADULT - DIAGNOSTIC IMPRESSIONS
1) Functional Oral Stage for Puree, Moderately Thick Liquids, Mildly Thick Liquids, and Thin Liquids characterized by adequate oral containment, adequate bolus manipulation, adequate anterior to posterior transfer, and adequate oral clearance.   2) Functional Pharyngeal Stage for Puree and Moderately Thick Liquids characterized by adequate initiation of the pharyngeal swallow, adequate hyolaryngeal excursion, adequate base of tongue retraction, adequate laryngeal vestibule closure, adequate epiglottic deflection, and adequate pharyngeal contractility. There is adequate pharyngeal clearance.   3) Severe Pharyngeal Dysphagia for Mildly Thick and Thin Liquids characterized by adequate initiation of the pharyngeal swallow, reduced hyolaryngeal excursion, adequate base of tongue retraction, reduced laryngeal vestibule closure, reduced epiglottic deflection, and adequate pharyngeal contractility. There is adequate pharyngeal clearance. 1) Functional Oral Stage for Puree, Moderately Thick Liquids, Mildly Thick Liquids, and Thin Liquids characterized by adequate oral containment, adequate bolus manipulation, adequate anterior to posterior transfer, and adequate oral clearance.   2) Functional Pharyngeal Stage for Puree and Moderately Thick Liquids characterized by adequate initiation of the pharyngeal swallow, adequate hyolaryngeal excursion, adequate base of tongue retraction, adequate laryngeal vestibule closure, adequate epiglottic deflection, and adequate pharyngeal contractility. There is adequate pharyngeal clearance. There is No Aspiration before, during, or after the swallow.   3) Severe Pharyngeal Dysphagia for Mildly Thick and Thin Liquids characterized by delayed initiation of the pharyngeal swallow (Bolus head at the vallecular), reduced hyolaryngeal excursion, adequate base of tongue retraction, reduced laryngeal vestibule closure, reduced epiglottic deflection, and adequate pharyngeal contractility. There is adequate pharyngeal clearance. There is Deep Laryngeal Penetration before the swallow to the level of the vocal folds without retrieval during the swallow for Mildly Thick Liquids. There is also Deep Laryngeal Penetration during the swallow without retrieval resulting in subsequent Aspiration during the swallow for Mildly Thick Liquids. There is Deep Laryngeal Penetration during the swallow without retrieval leading to subsequent Aspiration during the swallow for Thin Liquids. There is reduced laryngeal sensation given delayed coughing to Aspiration episode. 1) Functional Oral Stage for Puree, Moderately Thick Liquids, Mildly Thick Liquids, and Thin Liquids characterized by adequate oral containment, adequate bolus manipulation, adequate anterior to posterior transfer, and adequate oral clearance.   2) Functional Pharyngeal Stage for Puree and Moderately Thick Liquids characterized by adequate initiation of the pharyngeal swallow, adequate hyolaryngeal excursion, adequate base of tongue retraction, adequate laryngeal vestibule closure, adequate epiglottic deflection, and adequate pharyngeal contractility. There is adequate pharyngeal clearance. There is No Aspiration before, during, or after the swallow.   3) Severe Pharyngeal Dysphagia for Mildly Thick and Thin Liquids characterized by delayed initiation of the pharyngeal swallow (Bolus head at the vallecular), reduced hyolaryngeal excursion, adequate base of tongue retraction, reduced laryngeal vestibule closure, reduced epiglottic deflection, and adequate pharyngeal contractility. There is adequate pharyngeal clearance. There is Deep Laryngeal Penetration before the swallow to the level of the vocal folds without retrieval during the swallow for Mildly Thick Liquids resulting in residue remaining at the level of the vocal folds. There is also Deep Laryngeal Penetration during the swallow without retrieval resulting in subsequent Aspiration for Mildly Thick Liquids and Thin Liquids. There is reduced laryngeal sensation given delayed coughing to Aspiration episodes and unable to clear contrast for airway/trachea.

## 2023-03-21 LAB
ANION GAP SERPL CALC-SCNC: 10 MMOL/L — SIGNIFICANT CHANGE UP (ref 7–14)
BASOPHILS # BLD AUTO: 0.05 K/UL — SIGNIFICANT CHANGE UP (ref 0–0.2)
BASOPHILS NFR BLD AUTO: 0.7 % — SIGNIFICANT CHANGE UP (ref 0–2)
BUN SERPL-MCNC: 8 MG/DL — SIGNIFICANT CHANGE UP (ref 7–23)
CALCIUM SERPL-MCNC: 10.6 MG/DL — HIGH (ref 8.4–10.5)
CHLORIDE SERPL-SCNC: 103 MMOL/L — SIGNIFICANT CHANGE UP (ref 98–107)
CO2 SERPL-SCNC: 29 MMOL/L — SIGNIFICANT CHANGE UP (ref 22–31)
CREAT SERPL-MCNC: 0.59 MG/DL — SIGNIFICANT CHANGE UP (ref 0.5–1.3)
EGFR: 87 ML/MIN/1.73M2 — SIGNIFICANT CHANGE UP
EOSINOPHIL # BLD AUTO: 0.04 K/UL — SIGNIFICANT CHANGE UP (ref 0–0.5)
EOSINOPHIL NFR BLD AUTO: 0.5 % — SIGNIFICANT CHANGE UP (ref 0–6)
GLUCOSE BLDC GLUCOMTR-MCNC: 111 MG/DL — HIGH (ref 70–99)
GLUCOSE SERPL-MCNC: 123 MG/DL — HIGH (ref 70–99)
HCT VFR BLD CALC: 36.3 % — SIGNIFICANT CHANGE UP (ref 34.5–45)
HGB BLD-MCNC: 10.8 G/DL — LOW (ref 11.5–15.5)
IANC: 5.87 K/UL — SIGNIFICANT CHANGE UP (ref 1.8–7.4)
IMM GRANULOCYTES NFR BLD AUTO: 0.3 % — SIGNIFICANT CHANGE UP (ref 0–0.9)
LYMPHOCYTES # BLD AUTO: 0.86 K/UL — LOW (ref 1–3.3)
LYMPHOCYTES # BLD AUTO: 11.3 % — LOW (ref 13–44)
MAGNESIUM SERPL-MCNC: 1.7 MG/DL — SIGNIFICANT CHANGE UP (ref 1.6–2.6)
MCHC RBC-ENTMCNC: 25.1 PG — LOW (ref 27–34)
MCHC RBC-ENTMCNC: 29.8 GM/DL — LOW (ref 32–36)
MCV RBC AUTO: 84.4 FL — SIGNIFICANT CHANGE UP (ref 80–100)
MONOCYTES # BLD AUTO: 0.78 K/UL — SIGNIFICANT CHANGE UP (ref 0–0.9)
MONOCYTES NFR BLD AUTO: 10.2 % — SIGNIFICANT CHANGE UP (ref 2–14)
NEUTROPHILS # BLD AUTO: 5.87 K/UL — SIGNIFICANT CHANGE UP (ref 1.8–7.4)
NEUTROPHILS NFR BLD AUTO: 77 % — SIGNIFICANT CHANGE UP (ref 43–77)
NRBC # BLD: 0 /100 WBCS — SIGNIFICANT CHANGE UP (ref 0–0)
NRBC # FLD: 0 K/UL — SIGNIFICANT CHANGE UP (ref 0–0)
PHOSPHATE SERPL-MCNC: 2.1 MG/DL — LOW (ref 2.5–4.5)
PLATELET # BLD AUTO: 317 K/UL — SIGNIFICANT CHANGE UP (ref 150–400)
POTASSIUM SERPL-MCNC: 3.4 MMOL/L — LOW (ref 3.5–5.3)
POTASSIUM SERPL-SCNC: 3.4 MMOL/L — LOW (ref 3.5–5.3)
RBC # BLD: 4.3 M/UL — SIGNIFICANT CHANGE UP (ref 3.8–5.2)
RBC # FLD: 15.6 % — HIGH (ref 10.3–14.5)
SODIUM SERPL-SCNC: 142 MMOL/L — SIGNIFICANT CHANGE UP (ref 135–145)
WBC # BLD: 7.62 K/UL — SIGNIFICANT CHANGE UP (ref 3.8–10.5)
WBC # FLD AUTO: 7.62 K/UL — SIGNIFICANT CHANGE UP (ref 3.8–10.5)

## 2023-03-21 PROCEDURE — 99233 SBSQ HOSP IP/OBS HIGH 50: CPT

## 2023-03-21 PROCEDURE — 93306 TTE W/DOPPLER COMPLETE: CPT | Mod: 26

## 2023-03-21 RX ORDER — LANOLIN ALCOHOL/MO/W.PET/CERES
3 CREAM (GRAM) TOPICAL ONCE
Refills: 0 | Status: COMPLETED | OUTPATIENT
Start: 2023-03-21 | End: 2023-03-21

## 2023-03-21 RX ORDER — HALOPERIDOL DECANOATE 100 MG/ML
0.25 INJECTION INTRAMUSCULAR ONCE
Refills: 0 | Status: COMPLETED | OUTPATIENT
Start: 2023-03-21 | End: 2023-03-21

## 2023-03-21 RX ORDER — POTASSIUM CHLORIDE 20 MEQ
10 PACKET (EA) ORAL
Refills: 0 | Status: DISCONTINUED | OUTPATIENT
Start: 2023-03-21 | End: 2023-03-21

## 2023-03-21 RX ORDER — LANOLIN ALCOHOL/MO/W.PET/CERES
3 CREAM (GRAM) TOPICAL AT BEDTIME
Refills: 0 | Status: DISCONTINUED | OUTPATIENT
Start: 2023-03-21 | End: 2023-03-21

## 2023-03-21 RX ORDER — POTASSIUM CHLORIDE 20 MEQ
40 PACKET (EA) ORAL ONCE
Refills: 0 | Status: COMPLETED | OUTPATIENT
Start: 2023-03-21 | End: 2023-03-21

## 2023-03-21 RX ORDER — SODIUM,POTASSIUM PHOSPHATES 278-250MG
1 POWDER IN PACKET (EA) ORAL
Refills: 0 | Status: COMPLETED | OUTPATIENT
Start: 2023-03-21 | End: 2023-03-22

## 2023-03-21 RX ORDER — LANOLIN ALCOHOL/MO/W.PET/CERES
6 CREAM (GRAM) TOPICAL AT BEDTIME
Refills: 0 | Status: DISCONTINUED | OUTPATIENT
Start: 2023-03-21 | End: 2023-03-23

## 2023-03-21 RX ADMIN — HALOPERIDOL DECANOATE 0.25 MILLIGRAM(S): 100 INJECTION INTRAMUSCULAR at 08:48

## 2023-03-21 RX ADMIN — Medication 1 TABLET(S): at 17:53

## 2023-03-21 RX ADMIN — MIDODRINE HYDROCHLORIDE 10 MILLIGRAM(S): 2.5 TABLET ORAL at 14:55

## 2023-03-21 RX ADMIN — RIVAROXABAN 20 MILLIGRAM(S): KIT at 17:54

## 2023-03-21 RX ADMIN — Medication 3 MILLIGRAM(S): at 01:45

## 2023-03-21 RX ADMIN — Medication 100 MICROGRAM(S): at 06:40

## 2023-03-21 RX ADMIN — Medication 12.5 MILLIGRAM(S): at 01:47

## 2023-03-21 RX ADMIN — POLYETHYLENE GLYCOL 3350 17 GRAM(S): 17 POWDER, FOR SOLUTION ORAL at 11:37

## 2023-03-21 RX ADMIN — Medication 40 MILLIEQUIVALENT(S): at 14:49

## 2023-03-21 RX ADMIN — PANTOPRAZOLE SODIUM 40 MILLIGRAM(S): 20 TABLET, DELAYED RELEASE ORAL at 06:40

## 2023-03-21 NOTE — PROGRESS NOTE ADULT - PROBLEM SELECTOR PLAN 8
- F: none  - E: replete K<4, Mg<2  - N: pureed diet, moderately thick liquids  - D: xarelto  - G: protonix    code: full  dispo: pending medical optimization; PT recs AFTAB    -Hypokalemia- replete k  -Hypophosphatemia- replete phos

## 2023-03-21 NOTE — PROGRESS NOTE ADULT - PROBLEM SELECTOR PLAN 1
- pt with aspiration event outpt 1wk prior to admission with subsequent development of productive cough; saw PCP with CXR c/f aspiration PNA and referred to ER   - course c/b aspiration event with RRT -- not protecting airway and was I&S and transferred to MICU; extubated 3/17 to venturi mask and now weaned of oxygen  - tracheal sputum cx neg, MRSA neg  - S+S consulted -- recommending pureed diet with moderately thick liquids  - s/p XR cinesophagogram  - aspiration precautions

## 2023-03-21 NOTE — PROVIDER CONTACT NOTE (OTHER) - REASON
TOV
bp
HR: 124, pt. asymptomatic
mental status
oliguria
Pt. voided 200 ml via primafit, chux wet, BS scan showed 320

## 2023-03-21 NOTE — PROGRESS NOTE ADULT - SUBJECTIVE AND OBJECTIVE BOX
Patient is a 88y old  Female who presents with a chief complaint of Shortness of breath (21 Mar 2023 12:02)      SUBJECTIVE / OVERNIGHT EVENTS: Pt seen and examined at 10:45am, no overnight events, was agitated earlier and received low dose haldol, per son at bedside pt did not sleep well and was calling family members last night, asking for melatonin for pt, has some cough, no sob, or any other complaints. Son John and daughter in law at bedside. No  other new issues reported.        MEDICATIONS  (STANDING):  levothyroxine 100 MICROGram(s) Oral daily  melatonin 6 milliGRAM(s) Oral at bedtime  metoprolol tartrate 12.5 milliGRAM(s) Oral every 12 hours  midodrine 10 milliGRAM(s) Oral every 8 hours  pantoprazole    Tablet 40 milliGRAM(s) Oral before breakfast  polyethylene glycol 3350 17 Gram(s) Oral daily  potassium phosphate / sodium phosphate Tablet (K-PHOS No. 2) 1 Tablet(s) Oral three times a day with meals  rivaroxaban 20 milliGRAM(s) Oral with dinner  senna 2 Tablet(s) Oral at bedtime    MEDICATIONS  (PRN):  acetaminophen     Tablet .. 650 milliGRAM(s) Oral every 6 hours PRN Mild Pain (1 - 3)  guaiFENesin Oral Liquid (Sugar-Free) 200 milliGRAM(s) Oral every 6 hours PRN Cough  lidocaine   4% Patch 1 Patch Transdermal every 24 hours PRN back pain  ondansetron Injectable 4 milliGRAM(s) IV Push every 6 hours PRN Nausea and/or Vomiting      Vital Signs Last 24 Hrs  T(C): 36.8 (21 Mar 2023 12:26), Max: 37.5 (21 Mar 2023 01:44)  T(F): 98.2 (21 Mar 2023 12:26), Max: 99.5 (21 Mar 2023 01:44)  HR: 95 (21 Mar 2023 12:26) (90 - 116)  BP: 119/66 (21 Mar 2023 12:26) (119/66 - 164/91)  BP(mean): --  RR: 17 (21 Mar 2023 12:26) (16 - 17)  SpO2: 97% (21 Mar 2023 12:26) (95% - 100%)    Parameters below as of 21 Mar 2023 12:26  Patient On (Oxygen Delivery Method): room air      CAPILLARY BLOOD GLUCOSE        I&O's Summary    20 Mar 2023 07:01  -  21 Mar 2023 07:00  --------------------------------------------------------  IN: 320 mL / OUT: 1200 mL / NET: -880 mL        PHYSICAL EXAM:  GENERAL: NAD, thinly built female  CHEST/LUNG: Clear to auscultation bilaterally; No wheeze  HEART: Regular rate and rhythm  ABDOMEN: Soft, Nontender, Nondistended  EXTREMITIES: no LE edema  PSYCH: Calm  NEUROLOGY: AA answers all questions appropriately  SKIN: No rashes or lesions    LABS:                        10.8   7.62  )-----------( 317      ( 21 Mar 2023 11:36 )             36.3     03-21    142  |  103  |  8   ----------------------------<  123<H>  3.4<L>   |  29  |  0.59    Ca    10.6<H>      21 Mar 2023 11:36  Phos  2.1     03-21  Mg     1.70     03-21                RADIOLOGY & ADDITIONAL TESTS:    Imaging Personally Reviewed:    Consultant(s) Notes Reviewed:      Care Discussed with Consultants/Other Providers:

## 2023-03-21 NOTE — PROVIDER CONTACT NOTE (OTHER) - ACTION/TREATMENT ORDERED:
ACP made aware. As per ACP, no straight cath at this time- repeat BS in 2 hrs.
acp aware
acp aware  ordered halodol
PA made aware. Awaiting for new orders.
ACP made aware. Will continue to monitor pt. at this time.

## 2023-03-21 NOTE — PROVIDER CONTACT NOTE (OTHER) - SITUATION
HR: 124, pt. asymptomatic
PAtient very anxious and agitated. Per night nurse night acp was aware
Placed Foey and noticed to have oliguria
bp 95/51 hr 95  holding metoprolol per parameter  giving midodrine
Patient's porras was removed. Tria of void protocol initiated. Bladder scan performed at 5am revealing 326 ml of retained urine.
Pt. voided 200 ml via primafit, chux wet, BS scan showed 320

## 2023-03-21 NOTE — PROGRESS NOTE ADULT - ASSESSMENT
Pt is an 89 yo F with PMH HTN, HLD, a-fib (xarelto), GERD, hypothyroidism, and OA p/w SOB and weakness. Course c/b aspiration event requiring intubation/sedation and MICU admission. Receiving zosyn for aspiration PNA. Extubated and evaluated by S+S with recommendation for pureed diet and moderately thick liquids; tube feeds stopped and NGT removed. PT recs AFTAB.

## 2023-03-21 NOTE — PROVIDER CONTACT NOTE (OTHER) - ASSESSMENT
Patient voided 150 ml. Abdomin is soft upon palpation. No complaints of pain.
On assessment, abdomen nondistended and non tender. 200 ml noted in primafit and chux is wet. Pt. encouraged to continue to try and void.
patient trying to get out of bed and screaming. Able to answer her name,  and what year and month it is
On assessment, pt. is asymptomatic. Denies SOB and/or chest pain at this time. Pt. afebrile, VS charted in flowsheet.
bp 95/51 hr 95  holding metoprolol per parameter  giving midodrine

## 2023-03-21 NOTE — PROVIDER CONTACT NOTE (OTHER) - DATE AND TIME:
19-Mar-2023 05:00
19-Mar-2023 10:50
15-Mar-2023 15:00
21-Mar-2023 14:52
19-Mar-2023 09:30
21-Mar-2023 07:45

## 2023-03-21 NOTE — PROGRESS NOTE ADULT - SUBJECTIVE AND OBJECTIVE BOX
Date of service 3/21/23    chief complaint: SOB    extended hpi: 88-year-old female with a pmh of A-fib on Xarelto, GERD, hypothyroid, hypertension, hyperlipidemia, arthritis who inially presented with complaints of shortness of breath for a few weeks and generalized weakness. She was admitted for Aspiration PNA, had rapid response called after patient was given water to drink as part of dysphagia screen found to be gurgling and then subsequently became unresponsive. After intubation patient hypotensive requiring pressors.    still has congestion with cough, family translating at bedside.  states shes not sleeping at night and has been calling family., no other events noted, no chest pain or SOB    Review of Systems:   Constitutional: [ ] fevers, [ ] chills.   Skin: [ ] dry skin. [ ] rashes.  Psychiatric: [ ] depression, [ ] anxiety.   Gastrointestinal: [ ] BRBPR, [ ] melena.   Neurological: [ ] confusion. [ ] seizures. [ ] shuffling gait.   Ears,Nose,Mouth and Throat: [ ] ear pain [ ] sore throat.   Eyes: [ ] diplopia.   Respiratory: [ ] hemoptysis. [ ] shortness of breath  Cardiovascular: See HPI above  Hematologic/Lymphatic: [ ] anemia. [ ] painful nodes. [ ] prolonged bleeding.   Genitourinary: [ ] hematuria. [ ] flank pain.   Endocrine: [ ] significant change in weight. [ ] intolerance to heat and cold.     Review of systems [ x] otherwise negative, [ ] otherwise unable to obtain    FH: no family history of sudden cardiac death in first degree relatives    SH: [ ] tobacco, [ ] alcohol, [ ] drugs    acetaminophen     Tablet .. 650 milliGRAM(s) Oral every 6 hours PRN  guaiFENesin Oral Liquid (Sugar-Free) 200 milliGRAM(s) Oral every 6 hours PRN  levothyroxine 100 MICROGram(s) Oral daily  lidocaine   4% Patch 1 Patch Transdermal every 24 hours PRN  melatonin 6 milliGRAM(s) Oral at bedtime  metoprolol tartrate 12.5 milliGRAM(s) Oral every 12 hours  midodrine 10 milliGRAM(s) Oral every 8 hours  ondansetron Injectable 4 milliGRAM(s) IV Push every 6 hours PRN  pantoprazole    Tablet 40 milliGRAM(s) Oral before breakfast  polyethylene glycol 3350 17 Gram(s) Oral daily  rivaroxaban 20 milliGRAM(s) Oral with dinner  senna 2 Tablet(s) Oral at bedtime                          9.4    6.65  )-----------( 320      ( 20 Mar 2023 05:21 )             31.9     140  |  100  |  12  ----------------------------<  95  3.3<L>   |  29  |  0.72    Ca    10.6<H>      20 Mar 2023 05:21  Phos  2.7     03-20  Mg     1.70     03-20    T(C): 37.4 (03-21-23 @ 06:30), Max: 37.5 (03-21-23 @ 01:44)  HR: 116 (03-21-23 @ 06:30) (88 - 116)  BP: 153/90 (03-21-23 @ 06:30) (130/60 - 164/91)  RR: 17 (03-21-23 @ 06:30) (16 - 18)  SpO2: 98% (03-21-23 @ 06:30) (94% - 100%)  Wt(kg): --    General: awake and alert, cooperative.  speaks only Chadian, daughter translates.  Head: Normocephalic and atraumatic.   Neck: No JVD. No bruits. Supple. Does not appear to be enlarged.   Cardiovascular: + S1,S2 ; RRR Soft systolic murmur at the left lower sternal border. No rubs noted.    Lungs: coarse BS BL  Abdomen: + BS, soft. Non tender. Non distended. No rebound. No guarding.   Extremities: No clubbing/cyanosis/edema.   Neurologic: unable to assess  Skin: Warm and moist. The patient's skin has normal elasticity and good skin turgor.   Psychiatric: unable to assess  Musculoskeletal: unable to assess    DATA    tele- SR ST      < from: Transthoracic Echocardiogram (03.15.23 @ 13:49) >  CONCLUSIONS:  1. Mitral annular calcification, otherwise normal mitral  valve. Mild-moderate mitral regurgitation.  2. Calcified trileaflet aortic valve with normal opening.  Mild aortic regurgitation.  3. Normal left ventricular internal dimensions and wall  thicknesses.  4. Endocardium not well visualized; grossly moderate global  left ventricular systolic dysfunction.  5. Mild diastolic dysfunction (Stage I).  6. The right ventricle is not well visualized; grossly  normal right ventricular systolic function.  ------------------------------------------------------------------------  Confirmed on  3/15/2023 - 15:05:45 by Kaveh Jimenez M.D.,  [facility], [place]  -----------------------    < end of copied text >    ASSESSMENT/PLAN: Pt is a 88-year-old female with a pmh of A-fib on Xarelto, GERD, hypothyroid, hypertension, hyperlipidemia, arthritis who inially presented with complaints of shortness of breath for a few weeks and generalized weakness.  She was admitted for Aspiration PNA, had rapid response called after patient was given water to drink as part of dysphagia screen found to be gurgling and then subsequently became unresponsive. After intubation patient hypotensive requiring pressor support.      1. Shock  - likely septic due to asp PNA and +UA  - previous normal LVEF on echo, now with LVEF 40%--suspect due to septic shock  -repeat echo to re eval LV function 3/22    2. PAF  - in sinus   - Xarelto resumed    3. Elevated Trop T  --in the setting of PANCHITO, sepsis, which have now resolved  --not c/w ACS  --repeat Echo to re eval LV function    Thank you for allowing us to participate in the care of our mutual patient.  Please do not hesitate to call with any questions.   OP F/u in the office with Dr Forbes after DC, [phone]       Date of service 3/21/23    chief complaint: SOB    extended hpi: 88-year-old female with a pmh of A-fib on Xarelto, GERD, hypothyroid, hypertension, hyperlipidemia, arthritis who inially presented with complaints of shortness of breath for a few weeks and generalized weakness. She was admitted for Aspiration PNA, had rapid response called after patient was given water to drink as part of dysphagia screen found to be gurgling and then subsequently became unresponsive. After intubation patient hypotensive requiring pressors.    still has congestion with cough, family translating at bedside.  states shes not sleeping at night and has been calling family., no other events noted, no chest pain or SOB    Review of Systems:   Constitutional: [ ] fevers, [ ] chills.   Skin: [ ] dry skin. [ ] rashes.  Psychiatric: [ ] depression, [ ] anxiety.   Gastrointestinal: [ ] BRBPR, [ ] melena.   Neurological: [ ] confusion. [ ] seizures. [ ] shuffling gait.   Ears,Nose,Mouth and Throat: [ ] ear pain [ ] sore throat.   Eyes: [ ] diplopia.   Respiratory: [ ] hemoptysis. [ ] shortness of breath  Cardiovascular: See HPI above  Hematologic/Lymphatic: [ ] anemia. [ ] painful nodes. [ ] prolonged bleeding.   Genitourinary: [ ] hematuria. [ ] flank pain.   Endocrine: [ ] significant change in weight. [ ] intolerance to heat and cold.     Review of systems [ x] otherwise negative, [ ] otherwise unable to obtain    FH: no family history of sudden cardiac death in first degree relatives    SH: [ ] tobacco, [ ] alcohol, [ ] drugs    acetaminophen     Tablet .. 650 milliGRAM(s) Oral every 6 hours PRN  guaiFENesin Oral Liquid (Sugar-Free) 200 milliGRAM(s) Oral every 6 hours PRN  levothyroxine 100 MICROGram(s) Oral daily  lidocaine   4% Patch 1 Patch Transdermal every 24 hours PRN  melatonin 6 milliGRAM(s) Oral at bedtime  metoprolol tartrate 12.5 milliGRAM(s) Oral every 12 hours  midodrine 10 milliGRAM(s) Oral every 8 hours  ondansetron Injectable 4 milliGRAM(s) IV Push every 6 hours PRN  pantoprazole    Tablet 40 milliGRAM(s) Oral before breakfast  polyethylene glycol 3350 17 Gram(s) Oral daily  rivaroxaban 20 milliGRAM(s) Oral with dinner  senna 2 Tablet(s) Oral at bedtime                          9.4    6.65  )-----------( 320      ( 20 Mar 2023 05:21 )             31.9     140  |  100  |  12  ----------------------------<  95  3.3<L>   |  29  |  0.72    Ca    10.6<H>      20 Mar 2023 05:21  Phos  2.7     03-20  Mg     1.70     03-20    T(C): 37.4 (03-21-23 @ 06:30), Max: 37.5 (03-21-23 @ 01:44)  HR: 116 (03-21-23 @ 06:30) (88 - 116)  BP: 153/90 (03-21-23 @ 06:30) (130/60 - 164/91)  RR: 17 (03-21-23 @ 06:30) (16 - 18)  SpO2: 98% (03-21-23 @ 06:30) (94% - 100%)  Wt(kg): --    General: awake and alert, cooperative.  speaks only Cambodian, daughter translates.  Head: Normocephalic and atraumatic.   Neck: No JVD. No bruits. Supple. Does not appear to be enlarged.   Cardiovascular: + S1,S2 ; RRR Soft systolic murmur at the left lower sternal border. No rubs noted.    Lungs: coarse BS BL  Abdomen: + BS, soft. Non tender. Non distended. No rebound. No guarding.   Extremities: No clubbing/cyanosis/edema.   Neurologic: unable to assess  Skin: Warm and moist. The patient's skin has normal elasticity and good skin turgor.   Psychiatric: unable to assess  Musculoskeletal: unable to assess    DATA    tele- SR ST      < from: Transthoracic Echocardiogram (03.15.23 @ 13:49) >  CONCLUSIONS:  1. Mitral annular calcification, otherwise normal mitral  valve. Mild-moderate mitral regurgitation.  2. Calcified trileaflet aortic valve with normal opening.  Mild aortic regurgitation.  3. Normal left ventricular internal dimensions and wall  thicknesses.  4. Endocardium not well visualized; grossly moderate global  left ventricular systolic dysfunction.  5. Mild diastolic dysfunction (Stage I).  6. The right ventricle is not well visualized; grossly  normal right ventricular systolic function.  ------------------------------------------------------------------------  Confirmed on  3/15/2023 - 15:05:45 by Kaveh Jimenez M.D.,  [facility], [place]  -----------------------    < end of copied text >    ASSESSMENT/PLAN: Pt is a 88-year-old female with a pmh of A-fib on Xarelto, GERD, hypothyroid, hypertension, hyperlipidemia, arthritis who inially presented with complaints of shortness of breath for a few weeks and generalized weakness.  She was admitted for Aspiration PNA, had rapid response called after patient was given water to drink as part of dysphagia screen found to be gurgling and then subsequently became unresponsive. After intubation patient hypotensive requiring pressor support.      1. Shock  - likely septic due to asp PNA and +UA  - previous normal LVEF on echo, now with LVEF 40%--suspect due to septic shock  - repeat echo to re eval LV function 3/22    2. PAF  - in sinus   - Xarelto resumed    3. Elevated Trop T  --in the setting of PANCHITO, sepsis, which have now resolved  --not c/w ACS  --repeat Echo to re eval LV function    Thank you for allowing us to participate in the care of our mutual patient.  Please do not hesitate to call with any questions.   OP F/u in the office with Dr Forbes after DC, [phone]

## 2023-03-21 NOTE — PROGRESS NOTE ADULT - PROBLEM SELECTOR PLAN 2
- admission UA+ with UCx growing E coli  - zosyn 3/14-18  - cont CTX for a total 5d total course of abx  completed on 3/20  - vern sam'rajni --- spontaneously voiding now

## 2023-03-22 LAB
ANION GAP SERPL CALC-SCNC: 10 MMOL/L — SIGNIFICANT CHANGE UP (ref 7–14)
BASE EXCESS BLDV CALC-SCNC: 6.6 MMOL/L — HIGH (ref -2–3)
BASOPHILS # BLD AUTO: 0.06 K/UL — SIGNIFICANT CHANGE UP (ref 0–0.2)
BASOPHILS NFR BLD AUTO: 0.8 % — SIGNIFICANT CHANGE UP (ref 0–2)
BLOOD GAS VENOUS COMPREHENSIVE RESULT: SIGNIFICANT CHANGE UP
BUN SERPL-MCNC: 11 MG/DL — SIGNIFICANT CHANGE UP (ref 7–23)
CA-I BLD-SCNC: 1.37 MMOL/L — HIGH (ref 1.15–1.29)
CALCIUM SERPL-MCNC: 10.6 MG/DL — HIGH (ref 8.4–10.5)
CHLORIDE BLDV-SCNC: 105 MMOL/L — SIGNIFICANT CHANGE UP (ref 96–108)
CHLORIDE SERPL-SCNC: 103 MMOL/L — SIGNIFICANT CHANGE UP (ref 98–107)
CK SERPL-CCNC: 33 U/L — SIGNIFICANT CHANGE UP (ref 25–170)
CO2 BLDV-SCNC: 33.9 MMOL/L — HIGH (ref 22–26)
CO2 SERPL-SCNC: 28 MMOL/L — SIGNIFICANT CHANGE UP (ref 22–31)
CREAT SERPL-MCNC: 0.63 MG/DL — SIGNIFICANT CHANGE UP (ref 0.5–1.3)
EGFR: 85 ML/MIN/1.73M2 — SIGNIFICANT CHANGE UP
EOSINOPHIL # BLD AUTO: 0.28 K/UL — SIGNIFICANT CHANGE UP (ref 0–0.5)
EOSINOPHIL NFR BLD AUTO: 3.5 % — SIGNIFICANT CHANGE UP (ref 0–6)
GAS PNL BLDV: 141 MMOL/L — SIGNIFICANT CHANGE UP (ref 136–145)
GLUCOSE BLDV-MCNC: 108 MG/DL — HIGH (ref 70–99)
GLUCOSE SERPL-MCNC: 112 MG/DL — HIGH (ref 70–99)
HCO3 BLDV-SCNC: 32 MMOL/L — HIGH (ref 22–29)
HCT VFR BLD CALC: 34.6 % — SIGNIFICANT CHANGE UP (ref 34.5–45)
HCT VFR BLDA CALC: 31 % — LOW (ref 34.5–46.5)
HGB BLD CALC-MCNC: 10.3 G/DL — LOW (ref 11.7–16.1)
HGB BLD-MCNC: 10 G/DL — LOW (ref 11.5–15.5)
IANC: 5.69 K/UL — SIGNIFICANT CHANGE UP (ref 1.8–7.4)
IMM GRANULOCYTES NFR BLD AUTO: 0.3 % — SIGNIFICANT CHANGE UP (ref 0–0.9)
LACTATE BLDV-MCNC: 1 MMOL/L — SIGNIFICANT CHANGE UP (ref 0.5–2)
LYMPHOCYTES # BLD AUTO: 1.15 K/UL — SIGNIFICANT CHANGE UP (ref 1–3.3)
LYMPHOCYTES # BLD AUTO: 14.4 % — SIGNIFICANT CHANGE UP (ref 13–44)
MAGNESIUM SERPL-MCNC: 1.7 MG/DL — SIGNIFICANT CHANGE UP (ref 1.6–2.6)
MCHC RBC-ENTMCNC: 25.4 PG — LOW (ref 27–34)
MCHC RBC-ENTMCNC: 28.9 GM/DL — LOW (ref 32–36)
MCV RBC AUTO: 88 FL — SIGNIFICANT CHANGE UP (ref 80–100)
MONOCYTES # BLD AUTO: 0.8 K/UL — SIGNIFICANT CHANGE UP (ref 0–0.9)
MONOCYTES NFR BLD AUTO: 10 % — SIGNIFICANT CHANGE UP (ref 2–14)
NEUTROPHILS # BLD AUTO: 5.69 K/UL — SIGNIFICANT CHANGE UP (ref 1.8–7.4)
NEUTROPHILS NFR BLD AUTO: 71 % — SIGNIFICANT CHANGE UP (ref 43–77)
NRBC # BLD: 0 /100 WBCS — SIGNIFICANT CHANGE UP (ref 0–0)
NRBC # FLD: 0 K/UL — SIGNIFICANT CHANGE UP (ref 0–0)
NT-PROBNP SERPL-SCNC: 867 PG/ML — HIGH
PCO2 BLDV: 51 MMHG — SIGNIFICANT CHANGE UP (ref 39–52)
PH BLDV: 7.41 — SIGNIFICANT CHANGE UP (ref 7.32–7.43)
PHOSPHATE SERPL-MCNC: 3.4 MG/DL — SIGNIFICANT CHANGE UP (ref 2.5–4.5)
PLATELET # BLD AUTO: 341 K/UL — SIGNIFICANT CHANGE UP (ref 150–400)
PO2 BLDV: 62 MMHG — HIGH (ref 25–45)
POTASSIUM BLDV-SCNC: 4.1 MMOL/L — SIGNIFICANT CHANGE UP (ref 3.5–5.1)
POTASSIUM SERPL-MCNC: 4.2 MMOL/L — SIGNIFICANT CHANGE UP (ref 3.5–5.3)
POTASSIUM SERPL-SCNC: 4.2 MMOL/L — SIGNIFICANT CHANGE UP (ref 3.5–5.3)
RBC # BLD: 3.93 M/UL — SIGNIFICANT CHANGE UP (ref 3.8–5.2)
RBC # FLD: 16 % — HIGH (ref 10.3–14.5)
SAO2 % BLDV: 91.5 % — HIGH (ref 67–88)
SODIUM SERPL-SCNC: 141 MMOL/L — SIGNIFICANT CHANGE UP (ref 135–145)
WBC # BLD: 8 K/UL — SIGNIFICANT CHANGE UP (ref 3.8–10.5)
WBC # FLD AUTO: 8 K/UL — SIGNIFICANT CHANGE UP (ref 3.8–10.5)

## 2023-03-22 PROCEDURE — 99233 SBSQ HOSP IP/OBS HIGH 50: CPT

## 2023-03-22 RX ORDER — SODIUM CHLORIDE 9 MG/ML
500 INJECTION, SOLUTION INTRAVENOUS ONCE
Refills: 0 | Status: COMPLETED | OUTPATIENT
Start: 2023-03-22 | End: 2023-03-22

## 2023-03-22 RX ORDER — MAGNESIUM SULFATE 500 MG/ML
1 VIAL (ML) INJECTION ONCE
Refills: 0 | Status: COMPLETED | OUTPATIENT
Start: 2023-03-22 | End: 2023-03-22

## 2023-03-22 RX ORDER — SODIUM CHLORIDE 9 MG/ML
500 INJECTION, SOLUTION INTRAVENOUS
Refills: 0 | Status: DISCONTINUED | OUTPATIENT
Start: 2023-03-22 | End: 2023-03-23

## 2023-03-22 RX ADMIN — Medication 650 MILLIGRAM(S): at 21:03

## 2023-03-22 RX ADMIN — SODIUM CHLORIDE 50 MILLILITER(S): 9 INJECTION, SOLUTION INTRAVENOUS at 05:51

## 2023-03-22 RX ADMIN — Medication 12.5 MILLIGRAM(S): at 20:53

## 2023-03-22 RX ADMIN — Medication 100 GRAM(S): at 03:15

## 2023-03-22 RX ADMIN — Medication 6 MILLIGRAM(S): at 21:03

## 2023-03-22 RX ADMIN — Medication 1 TABLET(S): at 08:40

## 2023-03-22 RX ADMIN — PANTOPRAZOLE SODIUM 40 MILLIGRAM(S): 20 TABLET, DELAYED RELEASE ORAL at 05:29

## 2023-03-22 RX ADMIN — Medication 100 MICROGRAM(S): at 05:23

## 2023-03-22 RX ADMIN — RIVAROXABAN 20 MILLIGRAM(S): KIT at 17:54

## 2023-03-22 RX ADMIN — MIDODRINE HYDROCHLORIDE 10 MILLIGRAM(S): 2.5 TABLET ORAL at 20:55

## 2023-03-22 RX ADMIN — SODIUM CHLORIDE 500 MILLILITER(S): 9 INJECTION, SOLUTION INTRAVENOUS at 02:04

## 2023-03-22 RX ADMIN — Medication 650 MILLIGRAM(S): at 21:33

## 2023-03-22 RX ADMIN — Medication 1 TABLET(S): at 12:29

## 2023-03-22 RX ADMIN — POLYETHYLENE GLYCOL 3350 17 GRAM(S): 17 POWDER, FOR SOLUTION ORAL at 12:29

## 2023-03-22 RX ADMIN — Medication 12.5 MILLIGRAM(S): at 12:29

## 2023-03-22 RX ADMIN — SENNA PLUS 2 TABLET(S): 8.6 TABLET ORAL at 20:54

## 2023-03-22 NOTE — CHART NOTE - NSCHARTNOTEFT_GEN_A_CORE
[facility] NIGHT MEDICINE COVERAGE    Notified by RN that pt family said pt is "lethargic".  , VSS.  Pt assessed at bedside.  Family member at bedside requested to interpret to pt's native language of [place].  Family member at bedside says pt is not opening her eyes.  Chart reviewed, pt noted to have received Haldol 0.25mg IM x1 in AM 3/21, and ordered to start standing melatonin this evening.  Per progress note, pt did not sleep the previous and kept calling her family.  Son, John, called by family during meeting at bedside.  He was told by team during the day that pt would be started on melatonin to help her sleep and reduce chance of confusion while hospitalized.    Vital Signs Last 24 Hrs  T(C): 37.5 (21 Mar 2023 22:00), Max: 37.5 (21 Mar 2023 01:44)  T(F): 99.5 (21 Mar 2023 22:00), Max: 99.5 (21 Mar 2023 01:44)  HR: 87 (21 Mar 2023 22:00) (85 - 116)  BP: 108/60 (21 Mar 2023 22:00) (95/51 - 164/91)  RR: 16 (21 Mar 2023 22:00) (16 - 18)  SpO2: 99% (21 Mar 2023 22:00) (97% - 99%)  O2 Parameters below as of 21 Mar 2023 22:00  Patient On (Oxygen Delivery Method): room air    On exam, pt is resting, arouses to stimuli and opens eyes.  She knows her name, , and the year, believes she is in [place], and is describing a "man".  She is able to follow commands when prompted in [place].  Afebrile on rectal temperature check.    Physical Exam:  GS: Frail female, A&Ox2, in NAD, calm  Eyes: PERRLA, EOM-I  Lungs: CTA b/l, w/o wheezes, rales, or rhonchi, occasional coughing noted  Heart: RRR, +S1S2, w/o murmur, rub, or gallop  Abdomen: Soft, NT/ND, +BS, no rebound or guarding noted  Extremities: Warm, well perfused, 2+ UE/LE pulses b/l, no LE edema noted b/l  Neuro: Non-focal, CN II-XII intact and symmetric b/l, JAMES x4, no rigidity noted    Plan:  -Pt is likely tired from poor sleep in hospital the prior night, FS is within normal limits, and pt is afebrile.  -Will send BMP, CBC, VBG, Ionized Calcium, CK, and Pro-BNP, will give IV hydration w/ LR bolus of 500mL, and 50mL/hr x 10 hours.  -Replete electrolytes PRN.  -Maintain regular sleep-wake cycle while hospitalized to reduce chance of delirium, c/w melatonin as ordered.  -Explained to family that confusion can occur in hospitalized patients, especially with advanced age, unfamiliar setting, and poor sleep - education and reassurance provided.  -Case d/w overnight HIC, Dr. Matos, who agrees w/ above assessment and plan.    Plan d/w RN, son over telephone, and family at bedside, all questions answered.  Pt stable at this time, will continue to monitor.    Javier Maynard PA-C  Department of Medicine - [facility]  In-House Pager: #[phone] [facility] NIGHT MEDICINE COVERAGE    Notified by RN that pt family said pt is "lethargic".  , VSS.  Pt assessed at bedside.  Family member at bedside requested to interpret to pt's native language of Gabonese.  Family member at bedside says pt is not opening her eyes.  Chart reviewed, pt noted to have received Haldol 0.25mg IM x1 in AM 3/21, and ordered to start standing melatonin this evening.  Per progress note, pt did not sleep the previous and kept calling her family.  Son, John, called by family during meeting at bedside.  He was told by team during the day that pt would be started on melatonin to help her sleep and reduce chance of confusion while hospitalized.    Vital Signs Last 24 Hrs  T(C): 37.5 (21 Mar 2023 22:00), Max: 37.5 (21 Mar 2023 01:44)  T(F): 99.5 (21 Mar 2023 22:00), Max: 99.5 (21 Mar 2023 01:44)  HR: 87 (21 Mar 2023 22:00) (85 - 116)  BP: 108/60 (21 Mar 2023 22:00) (95/51 - 164/91)  RR: 16 (21 Mar 2023 22:00) (16 - 18)  SpO2: 99% (21 Mar 2023 22:00) (97% - 99%)  O2 Parameters below as of 21 Mar 2023 22:00  Patient On (Oxygen Delivery Method): room air    On exam, pt is resting, arouses to stimuli and opens eyes.  She knows her name, , and the year, believes she is in [place], and is describing a "man".  She is able to follow commands when prompted in Gabonese.  Afebrile on rectal temperature check.    Physical Exam:  GS: Frail female, A&Ox2, in NAD, calm  Eyes: PERRLA, EOM-I  Lungs: CTA b/l, w/o wheezes, rales, or rhonchi, occasional coughing noted  Heart: RRR, +S1S2, w/o murmur, rub, or gallop  Abdomen: Soft, NT/ND, +BS, no rebound or guarding noted  Extremities: Warm, well perfused, 2+ UE/LE pulses b/l, no LE edema noted b/l  Neuro: Non-focal, CN II-XII intact and symmetric b/l, JAMES x4, no rigidity noted    Plan:  -Pt is likely tired from poor sleep in hospital the prior night, FS is within normal limits, and pt is afebrile.  -Will send BMP, CBC, VBG, Ionized Calcium, CK, and Pro-BNP, will give IV hydration w/ LR bolus of 500mL, and 50mL/hr x 10 hours.  -Replete electrolytes PRN.  -Maintain regular sleep-wake cycle while hospitalized to reduce chance of delirium, c/w melatonin as ordered.  -Explained to family that confusion can occur in hospitalized patients, especially with advanced age, unfamiliar setting, and poor sleep - education and reassurance provided.  -Case d/w overnight HIC, Dr. Matos, who agrees w/ above assessment and plan.    Plan d/w RN, son over telephone, and family at bedside, all questions answered.  Pt stable at this time, will continue to monitor.    ADDENDUM:  Reassessed pt this morning @ 6:15 AM.  Pt now OOB to chair, A&Ox3, conversing w/ daughter at bedside.  Daughter said pt is doing much better now, and is back to her baseline.  Importance of proper sleep-wake cycle to reduce hospital delirium discussed w/ daughter.  Pt otherwise reports feeling well.  D/w day provider.    Javier Maynard PA-C  Department of Medicine - [facility]  In-House Pager: #[phone]

## 2023-03-22 NOTE — PROGRESS NOTE ADULT - SUBJECTIVE AND OBJECTIVE BOX
Date of service 3/22/23    chief complaint: SOB    extended hpi: 88-year-old female with a pmh of A-fib on Xarelto, GERD, hypothyroid, hypertension, hyperlipidemia, arthritis who inially presented with complaints of shortness of breath for a few weeks and generalized weakness. She was admitted for Aspiration PNA, had rapid response called after patient was given water to drink as part of dysphagia screen found to be gurgling and then subsequently became unresponsive. After intubation patient hypotensive requiring pressors.    Overnight given haldol, sleeping today when seen, son by bedside    MEDICATIONS:  acetaminophen     Tablet .. 650 milliGRAM(s) Oral every 6 hours PRN  guaiFENesin Oral Liquid (Sugar-Free) 200 milliGRAM(s) Oral every 6 hours PRN  lactated ringers. 500 milliLiter(s) IV Continuous <Continuous>  levothyroxine 100 MICROGram(s) Oral daily  lidocaine   4% Patch 1 Patch Transdermal every 24 hours PRN  melatonin 6 milliGRAM(s) Oral at bedtime  metoprolol tartrate 12.5 milliGRAM(s) Oral every 12 hours  midodrine 10 milliGRAM(s) Oral every 8 hours  ondansetron Injectable 4 milliGRAM(s) IV Push every 6 hours PRN  pantoprazole    Tablet 40 milliGRAM(s) Oral before breakfast  polyethylene glycol 3350 17 Gram(s) Oral daily  rivaroxaban 20 milliGRAM(s) Oral with dinner  senna 2 Tablet(s) Oral at bedtime      LABS:                        10.0   8.00  )-----------( 341      ( 22 Mar 2023 01:20 )             34.6       Hemoglobin: 10.0 g/dL (03-22 @ 01:20)  Hemoglobin: 10.8 g/dL (03-21 @ 11:36)  Hemoglobin: 9.4 g/dL (03-20 @ 05:21)  Hemoglobin: 9.1 g/dL (03-19 @ 04:00)  Hemoglobin: 9.1 g/dL (03-18 @ 01:48)      03-22    141  |  103  |  11  ----------------------------<  112<H>  4.2   |  28  |  0.63    Ca    10.6<H>      22 Mar 2023 01:20  Phos  3.4     03-22  Mg     1.70     03-22      Creatinine Trend: 0.63<--, 0.59<--, 0.72<--, 0.92<--, 1.03<--, 1.03<--    COAGS:     CARDIAC MARKERS ( 22 Mar 2023 01:20 )  x     / x     / 33 U/L / x     / x            PHYSICAL EXAM:  T(C): 36.5 (03-22-23 @ 12:10), Max: 37.5 (03-21-23 @ 22:00)  HR: 88 (03-22-23 @ 12:10) (85 - 95)  BP: 121/69 (03-22-23 @ 12:10) (95/51 - 133/68)  RR: 17 (03-22-23 @ 12:10) (16 - 18)  SpO2: 99% (03-22-23 @ 05:00) (99% - 99%)  Wt(kg): --    I&O's Summary    21 Mar 2023 07:01  -  22 Mar 2023 07:00  --------------------------------------------------------  IN: 975 mL / OUT: 1150 mL / NET: -175 mL    22 Mar 2023 07:01  -  22 Mar 2023 12:45  --------------------------------------------------------  IN: 0 mL / OUT: 550 mL / NET: -550 mL        General: awake and alert, cooperative.  speaks only Kinyarwanda, daughter translates.  Head: Normocephalic and atraumatic.   Neck: No JVD. No bruits. Supple. Does not appear to be enlarged.   Cardiovascular: + S1,S2 ; RRR Soft systolic murmur at the left lower sternal border. No rubs noted.    Lungs: coarse BS BL  Abdomen: + BS, soft. Non tender. Non distended. No rebound. No guarding.   Extremities: No clubbing/cyanosis/edema.   Neurologic: unable to assess  Skin: Warm and moist. The patient's skin has normal elasticity and good skin turgor.   Psychiatric: unable to assess  Musculoskeletal: unable to assess    DATA    tele-  ST      < from: Transthoracic Echocardiogram (03.15.23 @ 13:49) >  CONCLUSIONS:  1. Mitral annular calcification, otherwise normal mitral  valve. Mild-moderate mitral regurgitation.  2. Calcified trileaflet aortic valve with normal opening.  Mild aortic regurgitation.  3. Normal left ventricular internal dimensions and wall  thicknesses.  4. Endocardium not well visualized; grossly moderate global  left ventricular systolic dysfunction.  5. Mild diastolic dysfunction (Stage I).  6. The right ventricle is not well visualized; grossly  normal right ventricular systolic function.  ------------------------------------------------------------------------  Confirmed on  3/15/2023 - 15:05:45 by Kaveh Jimenez M.D.,  [facility], [facility]  -----------------------    < end of copied text >    TTE 03/21  1. Mitral annular calcification, otherwise normal mitral valve. Moderate mitral regurgitation.  2. Calcified trileaflet aortic valve with normal opening. Mild aortic regurgitation.  3. Moderate concentric left ventricular hypertrophy.  4. Normal left ventricular systolic function. No segmental wall motion abnormalities.      ASSESSMENT/PLAN: Pt is a 88-year-old female with a pmh of A-fib on Xarelto, GERD, hypothyroid, hypertension, hyperlipidemia, arthritis who inially presented with complaints of shortness of breath for a few weeks and generalized weakness.  She was admitted for Aspiration PNA, had rapid response called after patient was given water to drink as part of dysphagia screen found to be gurgling and then subsequently became unresponsive. After intubation patient hypotensive requiring pressor support.    1. Shock  - likely septic due to asp PNA and +UA  - previous normal LVEF on echo, now with LVEF 40%--suspect due to septic shock  - repeat echo with normal LVEF and no segmental wall motion abnormalities    2. PAF  - in sinus   - Xarelto resumed    3. Elevated Trop T  --in the setting of PANCHITO, sepsis, which have now resolved  --not c/w ACS  --repeat Echo with no segmental wall motion abnormalities    Thank you for allowing us to participate in the care of our mutual patient.  Please do not hesitate to call with any questions. OP F/u in the office with Dr Forbes after DC, [phone]      Emma Bliss MD  Pager: [phone]

## 2023-03-22 NOTE — PROGRESS NOTE ADULT - SUBJECTIVE AND OBJECTIVE BOX
Patient is a 88y old  Female who presents with a chief complaint of Shortness of breath (22 Mar 2023 12:44)      SUBJECTIVE / OVERNIGHT EVENTS: Pt seen and examined at 11:25am, overnight events noted, this am reports feeling tired, denies any sob, cough or any other complaints. Son John at bedside. No  other new issues reported.          MEDICATIONS  (STANDING):  lactated ringers. 500 milliLiter(s) (50 mL/Hr) IV Continuous <Continuous>  levothyroxine 100 MICROGram(s) Oral daily  melatonin 6 milliGRAM(s) Oral at bedtime  metoprolol tartrate 12.5 milliGRAM(s) Oral every 12 hours  midodrine 10 milliGRAM(s) Oral every 8 hours  pantoprazole    Tablet 40 milliGRAM(s) Oral before breakfast  polyethylene glycol 3350 17 Gram(s) Oral daily  rivaroxaban 20 milliGRAM(s) Oral with dinner  senna 2 Tablet(s) Oral at bedtime    MEDICATIONS  (PRN):  acetaminophen     Tablet .. 650 milliGRAM(s) Oral every 6 hours PRN Mild Pain (1 - 3)  guaiFENesin Oral Liquid (Sugar-Free) 200 milliGRAM(s) Oral every 6 hours PRN Cough  lidocaine   4% Patch 1 Patch Transdermal every 24 hours PRN back pain  ondansetron Injectable 4 milliGRAM(s) IV Push every 6 hours PRN Nausea and/or Vomiting      Vital Signs Last 24 Hrs  T(C): 36.5 (22 Mar 2023 12:10), Max: 37.5 (21 Mar 2023 22:00)  T(F): 97.7 (22 Mar 2023 12:10), Max: 99.5 (21 Mar 2023 22:00)  HR: 88 (22 Mar 2023 12:10) (85 - 88)  BP: 121/69 (22 Mar 2023 12:10) (108/60 - 133/68)  BP(mean): --  RR: 17 (22 Mar 2023 12:10) (16 - 18)  SpO2: 100% (22 Mar 2023 12:10) (99% - 100%)    Parameters below as of 22 Mar 2023 12:10  Patient On (Oxygen Delivery Method): room air      CAPILLARY BLOOD GLUCOSE      POCT Blood Glucose.: 111 mg/dL (21 Mar 2023 22:24)    I&O's Summary    21 Mar 2023 07:01  -  22 Mar 2023 07:00  --------------------------------------------------------  IN: 975 mL / OUT: 1150 mL / NET: -175 mL    22 Mar 2023 07:01  -  22 Mar 2023 15:18  --------------------------------------------------------  IN: 0 mL / OUT: 550 mL / NET: -550 mL        PHYSICAL EXAM:  GENERAL: NAD, thinly built female  CHEST/LUNG: Clear to auscultation bilaterally; No wheeze  HEART: Regular rate and rhythm  ABDOMEN: Soft, Nontender, Nondistended  EXTREMITIES: no LE edema  PSYCH: Calm  NEUROLOGY: AA oriented to self, place and month ( march) not to year  SKIN: No rashes or lesions    LABS:                        10.0   8.00  )-----------( 341      ( 22 Mar 2023 01:20 )             34.6     03-22    141  |  103  |  11  ----------------------------<  112<H>  4.2   |  28  |  0.63    Ca    10.6<H>      22 Mar 2023 01:20  Phos  3.4     03-22  Mg     1.70     03-22        CARDIAC MARKERS ( 22 Mar 2023 01:20 )  x     / x     / 33 U/L / x     / x              RADIOLOGY & ADDITIONAL TESTS:  < from: Transthoracic Echocardiogram (03.21.23 @ 18:01) >  . Mitral annular calcification, otherwise normal mitral  valve. Moderate mitral regurgitation.  2. Calcified trileaflet aortic valve with normal opening.  Mild aorticregurgitation.  3. Moderate concentric left ventricular hypertrophy.  4. Normal left ventricular systolic function. No segmental  wall motion abnormalities.    < end of copied text >    Imaging Personally Reviewed:    Consultant(s) Notes Reviewed:      Care Discussed with Consultants/Other Providers:

## 2023-03-22 NOTE — PROGRESS NOTE ADULT - PROBLEM SELECTOR PLAN 8
- F: none  - E: replete K<4, Mg<2  - N: pureed diet, moderately thick liquids  - D: xarelto  - G: protonix    code: full  dispo: pending medical optimization; PT recs AFTAB    -Hypercalcemia- mild, will cont ivf    Called daughter per request but unable to leave message on 3/22

## 2023-03-22 NOTE — PROGRESS NOTE ADULT - PROBLEM SELECTOR PLAN 4
- home med: xarelto 20mg daily  - now tolerating PO -- c/w xarelto home med  - cont lopressor 12.5mg BID  - will continue midodrine despite initiation of BB and hopefully dc in coming days; will cont midodrine as she had low bp last night and received ivf and is on ivf today

## 2023-03-23 ENCOUNTER — TRANSCRIPTION ENCOUNTER (OUTPATIENT)
Age: 88
End: 2023-03-23

## 2023-03-23 VITALS
HEART RATE: 67 BPM | TEMPERATURE: 98 F | RESPIRATION RATE: 18 BRPM | SYSTOLIC BLOOD PRESSURE: 115 MMHG | DIASTOLIC BLOOD PRESSURE: 47 MMHG | OXYGEN SATURATION: 97 %

## 2023-03-23 LAB
ANION GAP SERPL CALC-SCNC: 7 MMOL/L — SIGNIFICANT CHANGE UP (ref 7–14)
BASOPHILS # BLD AUTO: 0.05 K/UL — SIGNIFICANT CHANGE UP (ref 0–0.2)
BASOPHILS NFR BLD AUTO: 0.8 % — SIGNIFICANT CHANGE UP (ref 0–2)
BUN SERPL-MCNC: 11 MG/DL — SIGNIFICANT CHANGE UP (ref 7–23)
CALCIUM SERPL-MCNC: 10 MG/DL — SIGNIFICANT CHANGE UP (ref 8.4–10.5)
CHLORIDE SERPL-SCNC: 102 MMOL/L — SIGNIFICANT CHANGE UP (ref 98–107)
CO2 SERPL-SCNC: 31 MMOL/L — SIGNIFICANT CHANGE UP (ref 22–31)
CREAT SERPL-MCNC: 0.61 MG/DL — SIGNIFICANT CHANGE UP (ref 0.5–1.3)
EGFR: 86 ML/MIN/1.73M2 — SIGNIFICANT CHANGE UP
EOSINOPHIL # BLD AUTO: 0.43 K/UL — SIGNIFICANT CHANGE UP (ref 0–0.5)
EOSINOPHIL NFR BLD AUTO: 6.5 % — HIGH (ref 0–6)
GLUCOSE SERPL-MCNC: 89 MG/DL — SIGNIFICANT CHANGE UP (ref 70–99)
HCT VFR BLD CALC: 31 % — LOW (ref 34.5–45)
HGB BLD-MCNC: 8.8 G/DL — LOW (ref 11.5–15.5)
IANC: 4.19 K/UL — SIGNIFICANT CHANGE UP (ref 1.8–7.4)
IMM GRANULOCYTES NFR BLD AUTO: 0.6 % — SIGNIFICANT CHANGE UP (ref 0–0.9)
LYMPHOCYTES # BLD AUTO: 1.37 K/UL — SIGNIFICANT CHANGE UP (ref 1–3.3)
LYMPHOCYTES # BLD AUTO: 20.8 % — SIGNIFICANT CHANGE UP (ref 13–44)
MAGNESIUM SERPL-MCNC: 1.9 MG/DL — SIGNIFICANT CHANGE UP (ref 1.6–2.6)
MCHC RBC-ENTMCNC: 24.9 PG — LOW (ref 27–34)
MCHC RBC-ENTMCNC: 28.4 GM/DL — LOW (ref 32–36)
MCV RBC AUTO: 87.8 FL — SIGNIFICANT CHANGE UP (ref 80–100)
MONOCYTES # BLD AUTO: 0.5 K/UL — SIGNIFICANT CHANGE UP (ref 0–0.9)
MONOCYTES NFR BLD AUTO: 7.6 % — SIGNIFICANT CHANGE UP (ref 2–14)
NEUTROPHILS # BLD AUTO: 4.19 K/UL — SIGNIFICANT CHANGE UP (ref 1.8–7.4)
NEUTROPHILS NFR BLD AUTO: 63.7 % — SIGNIFICANT CHANGE UP (ref 43–77)
NRBC # BLD: 0 /100 WBCS — SIGNIFICANT CHANGE UP (ref 0–0)
NRBC # FLD: 0 K/UL — SIGNIFICANT CHANGE UP (ref 0–0)
PHOSPHATE SERPL-MCNC: 3.5 MG/DL — SIGNIFICANT CHANGE UP (ref 2.5–4.5)
PLATELET # BLD AUTO: 360 K/UL — SIGNIFICANT CHANGE UP (ref 150–400)
POTASSIUM SERPL-MCNC: 3.5 MMOL/L — SIGNIFICANT CHANGE UP (ref 3.5–5.3)
POTASSIUM SERPL-SCNC: 3.5 MMOL/L — SIGNIFICANT CHANGE UP (ref 3.5–5.3)
RBC # BLD: 3.53 M/UL — LOW (ref 3.8–5.2)
RBC # FLD: 15.9 % — HIGH (ref 10.3–14.5)
SARS-COV-2 RNA SPEC QL NAA+PROBE: SIGNIFICANT CHANGE UP
SODIUM SERPL-SCNC: 140 MMOL/L — SIGNIFICANT CHANGE UP (ref 135–145)
WBC # BLD: 6.58 K/UL — SIGNIFICANT CHANGE UP (ref 3.8–10.5)
WBC # FLD AUTO: 6.58 K/UL — SIGNIFICANT CHANGE UP (ref 3.8–10.5)

## 2023-03-23 PROCEDURE — 99239 HOSP IP/OBS DSCHRG MGMT >30: CPT

## 2023-03-23 PROCEDURE — 99497 ADVNCD CARE PLAN 30 MIN: CPT

## 2023-03-23 RX ORDER — LANOLIN ALCOHOL/MO/W.PET/CERES
2 CREAM (GRAM) TOPICAL
Qty: 0 | Refills: 0 | DISCHARGE
Start: 2023-03-23

## 2023-03-23 RX ORDER — METOPROLOL TARTRATE 50 MG
1 TABLET ORAL
Qty: 0 | Refills: 0 | DISCHARGE

## 2023-03-23 RX ORDER — METOPROLOL TARTRATE 50 MG
12.5 TABLET ORAL
Qty: 0 | Refills: 0 | DISCHARGE
Start: 2023-03-23

## 2023-03-23 RX ORDER — LIDOCAINE 4 G/100G
1 CREAM TOPICAL
Qty: 0 | Refills: 0 | DISCHARGE
Start: 2023-03-23

## 2023-03-23 RX ORDER — SENNA PLUS 8.6 MG/1
2 TABLET ORAL
Qty: 0 | Refills: 0 | DISCHARGE
Start: 2023-03-23

## 2023-03-23 RX ORDER — MIDODRINE HYDROCHLORIDE 2.5 MG/1
1 TABLET ORAL
Qty: 0 | Refills: 0 | DISCHARGE
Start: 2023-03-23

## 2023-03-23 RX ORDER — ACETAMINOPHEN 500 MG
2 TABLET ORAL
Qty: 0 | Refills: 0 | DISCHARGE
Start: 2023-03-23

## 2023-03-23 RX ORDER — POLYETHYLENE GLYCOL 3350 17 G/17G
17 POWDER, FOR SOLUTION ORAL
Qty: 0 | Refills: 0 | DISCHARGE
Start: 2023-03-23

## 2023-03-23 RX ADMIN — Medication 12.5 MILLIGRAM(S): at 07:29

## 2023-03-23 RX ADMIN — MIDODRINE HYDROCHLORIDE 10 MILLIGRAM(S): 2.5 TABLET ORAL at 07:28

## 2023-03-23 RX ADMIN — MIDODRINE HYDROCHLORIDE 10 MILLIGRAM(S): 2.5 TABLET ORAL at 15:19

## 2023-03-23 RX ADMIN — PANTOPRAZOLE SODIUM 40 MILLIGRAM(S): 20 TABLET, DELAYED RELEASE ORAL at 07:30

## 2023-03-23 RX ADMIN — Medication 100 MICROGRAM(S): at 07:29

## 2023-03-23 NOTE — PROGRESS NOTE ADULT - NS ATTEND AMEND GEN_ALL_CORE FT
Patient seen and examined. Agree with plan as detailed in PA/NP Note.      Recheck echo this week to evaluate LVEF    Emma Bliss MD  Pager: [phone]
Patient seen and examined. Agree with plan as detailed in PA/NP Note.      Repeat echo this week    Emma Bliss MD  Pager: [phone]
Patient seen and examined. Agree with plan as detailed in PA/NP Note.     Repeat TTE tomorrow      Emma Bliss MD  Pager: [phone]
Patient seen and examined. Agree with plan as detailed in PA/NP Note.     Repeat echo normal LVEF  No further cardiac work-up  F/u outpatient with Dr. Leidy Bliss MD  Pager: [phone]
Patient seen and examined. Agree with plan as detailed in PA/NP Note.     Recheck TTE this week      Emma Bliss MD  Pager: [phone]
Patient seen and examined. Agree with plan as detailed in PA/NP Note.      Global LV systolic dysfunction on echo, mildly elevated trop likely secondary to sepsis/PANCHITO, LVEF now down possibly due to sepsis, recheck  LVEF as infection resolves    Emma Bliss MD  Pager: [phone]

## 2023-03-23 NOTE — PROGRESS NOTE ADULT - PROBLEM SELECTOR PLAN 8
- F: none  - E: replete K<4, Mg<2  - N: pureed diet, moderately thick liquids  - D: xarelto  - G: protonix    code: full  dispo: pending medical optimization; PT recs AFTAB    -Hypercalcemia- improved  dc today, dc planning time spent in coordination 40mts ( preparing dc summary and plan)    Called daughter per request but unable to leave message on 3/22 - F: none  - E: replete K<4, Mg<2  - N: pureed diet, moderately thick liquids  - D: xarelto  - G: protonix    code: full  dispo: to rhab today      -Hypercalcemia- improved  - Discussed with son about GOC, wants pt to be full code as per pt's prior wishes, hence full code  dc today, dc planning time spent in coordination 40mts ( preparing dc summary and plan)

## 2023-03-23 NOTE — DISCHARGE NOTE PROVIDER - NSDCFUADDAPPT_GEN_ALL_CORE_FT
Follow up with PCP within 1-2 weeks of discharge. If you are in need of a general medicine physician and post-discharge medical follow-up for further care/recommendations you may contact the [facility] for an appointment at [phone].  Follow up with PCP within 1-2 weeks of discharge. If you are in need of a general medicine physician and post-discharge medical follow-up for further care/recommendations you may contact the [facility] for an appointment at [phone].     Follow up with cardiology within 1-2 weeks of discharge. If you are in need of a general cardiologist after discharge, you may contact the [facility] for an appointment at [phone].

## 2023-03-23 NOTE — PROGRESS NOTE ADULT - PROBLEM SELECTOR PLAN 4
- home med: xarelto 20mg daily  - now tolerating PO -- c/w xarelto home med  - cont lopressor 12.5mg BID  - will continue midodrine despite initiation of BB and hopefully dc in coming days; will cont midodrine as she had low bp last night and received ivf   -dc ivf

## 2023-03-23 NOTE — PROGRESS NOTE ADULT - SUBJECTIVE AND OBJECTIVE BOX
Patient is a 88y old  Female who presents with a chief complaint of Shortness of breath (23 Mar 2023 12:21)      SUBJECTIVE / OVERNIGHT EVENTS: Pt seen and examined at 11:25am, no overnight events, per son at bedside had good sleep last night, denies any sob, cough or any other complaints. Son John at bedside. No  other new issues reported.        MEDICATIONS  (STANDING):  lactated ringers. 500 milliLiter(s) (50 mL/Hr) IV Continuous <Continuous>  levothyroxine 100 MICROGram(s) Oral daily  melatonin 6 milliGRAM(s) Oral at bedtime  metoprolol tartrate 12.5 milliGRAM(s) Oral every 12 hours  midodrine 10 milliGRAM(s) Oral every 8 hours  pantoprazole    Tablet 40 milliGRAM(s) Oral before breakfast  polyethylene glycol 3350 17 Gram(s) Oral daily  rivaroxaban 20 milliGRAM(s) Oral with dinner  senna 2 Tablet(s) Oral at bedtime    MEDICATIONS  (PRN):  acetaminophen     Tablet .. 650 milliGRAM(s) Oral every 6 hours PRN Mild Pain (1 - 3)  guaiFENesin Oral Liquid (Sugar-Free) 200 milliGRAM(s) Oral every 6 hours PRN Cough  lidocaine   4% Patch 1 Patch Transdermal every 24 hours PRN back pain  ondansetron Injectable 4 milliGRAM(s) IV Push every 6 hours PRN Nausea and/or Vomiting      Vital Signs Last 24 Hrs  T(C): 36.9 (23 Mar 2023 12:56), Max: 36.9 (23 Mar 2023 12:56)  T(F): 98.5 (23 Mar 2023 12:56), Max: 98.5 (23 Mar 2023 12:56)  HR: 67 (23 Mar 2023 12:56) (67 - 75)  BP: 115/47 (23 Mar 2023 12:56) (115/47 - 120/55)  BP(mean): --  RR: 18 (23 Mar 2023 12:56) (18 - 18)  SpO2: 97% (23 Mar 2023 12:56) (97% - 97%)    Parameters below as of 23 Mar 2023 12:56  Patient On (Oxygen Delivery Method): room air      CAPILLARY BLOOD GLUCOSE        I&O's Summary    22 Mar 2023 07:01  -  23 Mar 2023 07:00  --------------------------------------------------------  IN: 0 mL / OUT: 850 mL / NET: -850 mL        PHYSICAL EXAM:  GENERAL: NAD, thinly built female  CHEST/LUNG: Clear to auscultation bilaterally; No wheeze  HEART: Regular rate and rhythm  ABDOMEN: Soft, Nontender, Nondistended  EXTREMITIES: no LE edema  PSYCH: Calm  NEUROLOGY: AA oriented to self, place and month ( march) not to year  SKIN: No rashes or lesions    LABS:                        8.8    6.58  )-----------( 360      ( 23 Mar 2023 06:20 )             31.0     03-23    140  |  102  |  11  ----------------------------<  89  3.5   |  31  |  0.61    Ca    10.0      23 Mar 2023 06:20  Phos  3.5     03-23  Mg     1.90     03-23        CARDIAC MARKERS ( 22 Mar 2023 01:20 )  x     / x     / 33 U/L / x     / x              RADIOLOGY & ADDITIONAL TESTS:    Imaging Personally Reviewed:    Consultant(s) Notes Reviewed:      Care Discussed with Consultants/Other Providers:

## 2023-03-23 NOTE — PROGRESS NOTE ADULT - PROBLEM SELECTOR PROBLEM 3
HFrEF (heart failure with reduced ejection fraction)
HFrEF (heart failure with reduced ejection fraction)
Atrial fibrillation
HFrEF (heart failure with reduced ejection fraction)

## 2023-03-23 NOTE — DISCHARGE NOTE NURSING/CASE MANAGEMENT/SOCIAL WORK - PATIENT PORTAL LINK FT
You can access the FollowMyHealth Patient Portal offered by [facility] by registering at the following website: [email or web address]. By joining [facility]’s FollowMyHealth portal, you will also be able to view your health information using other applications (apps) compatible with our system.

## 2023-03-23 NOTE — PROGRESS NOTE ADULT - PROBLEM SELECTOR PROBLEM 4
Atrial fibrillation
Hypothyroidism
Atrial fibrillation
Atrial fibrillation

## 2023-03-23 NOTE — DISCHARGE NOTE PROVIDER - DETAILS OF MALNUTRITION DIAGNOSIS/DIAGNOSES
This patient has been assessed with a concern for Malnutrition and was treated during this hospitalization for the following Nutrition diagnosis/diagnoses:     -  03/23/2023: Severe protein-calorie malnutrition

## 2023-03-23 NOTE — CHART NOTE - NSCHARTNOTEFT_GEN_A_CORE
Source: Patient [x ]    Family [x ]Son and daughter by bedside    other [x ] Chart review    Current Diet : Diet, Pureed:   DASH/TLC {Sodium & Cholesterol Restricted} (DASH)  Moderately Thick Liquids (MODTHICKLIQS) (03-18-23 @ 14:31) [Active]    PO intake:  26-75% [x ]    Height (cm): 154.9 (15 Mar 2023 03:27)  Weight (kg): 61.6kg (3/22), 62.5kg (3/15)  BMI (kg/m2): 25.6(3/22)    Nutrition Note: Pt is an 87 yo F with PMH HTN, HLD, a-fib (xarelto), GERD, hypothyroidism, and OA p/w SOB and weakness. Course c/b aspiration event requiring intubation/sedation and MICU admission. Receiving zosyn for aspiration PNA. Extubated and evaluated by S+S with recommendation for pureed diet and moderately thick liquids; tube feeds stopped and NGT removed. PT recs AFTAB, per chart.  Patient is Tajik speaking, alert and confused during visit. Information obtained from son and daughter by bedside. Family reports patient consumed ~75% of breakfast this morning. As per RN flow sheet, patient's meal consumption fluctuates from 26-75%, requires assistance in feeding. As per chart review, patient was previously intubated, on NGT (Glucerna 1.5 @35ml/hr x 24hrs), NGT removed. As per speech evaluation dated 3/20/2023, recommended puree with moderately thick liquids diet consistency. No reports of any difficulty chewing/swallowing current diet consistency at this time. Family denies any nausea, vomiting, diarrhea, constipation during visit. As per RN flow sheet, last bowel movement 3/21/2023. On bowel regimen. Noted last SnH0n-1.0% (3/16/2023), WNL. Son reports patient is allergic to all kinds of pepper, information updated in chart and CBORD. Noted patient has weight loss of -0.9kg/-1.5% BW loss x 1 week. As per previous RD note dated 3/16/2023, patient has decreased po intake x~1week prior to hospitalization. Weight loss possibly 2/2 decreased calorie intake and subside edema. Noted patient has 1+ edema to left and right arm documented on 3/17/2023. Nutrition focused physical exam performed, noted patient has severe muscle wasting to temporal region, moderate muscle wasting to clavicle region, severe fat loss to orbital region. Patient now met the criteria for severe malnutrition in the context of acute illness or injury. Patient is amendable to Ensure Plus High Protein supplement, and Magic Cup for nutrient support. As per RN flow sheet, no edema, no pressure injury noted at this time. Small frequent meals, nutrient dense foods encouraged during visit.     __________________ Pertinent Medications__________________   MEDICATIONS  (STANDING):  lactated ringers. 500 milliLiter(s) (50 mL/Hr) IV Continuous <Continuous>  levothyroxine 100 MICROGram(s) Oral daily  melatonin 6 milliGRAM(s) Oral at bedtime  metoprolol tartrate 12.5 milliGRAM(s) Oral every 12 hours  midodrine 10 milliGRAM(s) Oral every 8 hours  pantoprazole    Tablet 40 milliGRAM(s) Oral before breakfast  polyethylene glycol 3350 17 Gram(s) Oral daily  rivaroxaban 20 milliGRAM(s) Oral with dinner  senna 2 Tablet(s) Oral at bedtime    MEDICATIONS  (PRN):  acetaminophen     Tablet .. 650 milliGRAM(s) Oral every 6 hours PRN Mild Pain (1 - 3)  guaiFENesin Oral Liquid (Sugar-Free) 200 milliGRAM(s) Oral every 6 hours PRN Cough  lidocaine   4% Patch 1 Patch Transdermal every 24 hours PRN back pain  ondansetron Injectable 4 milliGRAM(s) IV Push every 6 hours PRN Nausea and/or Vomiting    __________________ Pertinent Labs__________________   03-23 Na140 mmol/L Glu 89 mg/dL K+ 3.5 mmol/L Cr  0.61 mg/dL BUN 11 mg/dL 03-23 Phos 3.5 mg/dL 03-17 Alb 3.0 g/dL<L>    Estimated Needs:   [x ] recalculated:   Based on current weight: 61.6kg (3/22/2023, per RN flow sheet)  Energy: 30-35kcal/kg/day= 1848-2156kcal/day  Protein: 1.2-1.5gm/kg/day= 73.9-92.4gm/day     Previous Nutrition Diagnosis:   [x ] Altered nutrition lab value   Nutrition Diagnosis is [x ] ongoing    New Nutrition Diagnosis:   [x ] Severe Malnutrition in the context of acute illness or injury  Related to: physiological causes  As evidenced by: weight loss of 1-2% x 1week, physical signs of severe-moderate muscle wasting, severe fat loss.     Interventions:   Recommend  [x] Continue to provide DASH/TLC, pureed, moderately thick liquid diet.   [x] Add Ensure Plus High Protein 8oz 2x/day (700kcal, 40gm protein) for nutrient support.   [x] Nutrition Department to provide Magic Cup 4oz 1x/day (290kcal, 9gm protein) for trial.  [x] Encourage PO intake and honor food preferences as able.       Monitoring and Evaluation:   [x ] PO intake [x ] Tolerance to diet prescription [ x] weights [ x] follow up per protocol  [x ] other: bowel movement, skin integrity, labs. Source: Patient [x ]    Family [x ]Son and daughter by bedside    other [x ] Chart review    Current Diet : Diet, Pureed:   DASH/TLC {Sodium & Cholesterol Restricted} (DASH)  Moderately Thick Liquids (MODTHICKLIQS) (03-18-23 @ 14:31) [Active]    PO intake:  26-75% [x ]    Height (cm): 154.9 (15 Mar 2023 03:27)  Weight (kg): 61.6kg (3/22), 62.5kg (3/15)  BMI (kg/m2): 25.6(3/22)    Nutrition Note: Pt is an 87 yo F with PMH HTN, HLD, a-fib (xarelto), GERD, hypothyroidism, and OA p/w SOB and weakness. Course c/b aspiration event requiring intubation/sedation and MICU admission. Receiving zosyn for aspiration PNA. Extubated and evaluated by S+S with recommendation for pureed diet and moderately thick liquids; tube feeds stopped and NGT removed. PT recs AFTAB, per chart.  Patient is Welsh speaking, alert and confused during visit. Information obtained from son and daughter by bedside. Family reports patient consumed ~75% of breakfast this morning. As per RN flow sheet, patient's meal consumption fluctuates from 26-75%, requires assistance in feeding. As per chart review, patient was previously intubated, on NGT (Glucerna 1.5 @35ml/hr x 24hrs), NGT removed. As per speech evaluation dated 3/20/2023, recommended puree with moderately thick liquids diet consistency. No reports of any difficulty chewing/swallowing current diet consistency at this time. Family denies any nausea, vomiting, diarrhea, constipation during visit. As per RN flow sheet, last bowel movement 3/21/2023. On bowel regimen. Noted last ByM6g-9.0% (3/16/2023), WNL. Son reports patient is allergic to all kinds of pepper, information updated in chart and CBORD. Noted patient has weight loss of -0.9kg/-1.5% BW loss x 1 week. As per previous RD note dated 3/16/2023, patient has decreased po intake x~1week prior to hospitalization. Weight loss possibly 2/2 decreased calorie intake and subside edema. Noted patient has 1+ edema to left and right arm documented on 3/17/2023. Nutrition focused physical exam performed, noted patient has severe muscle wasting to temporal region, moderate muscle wasting to clavicle region, severe fat loss to orbital region. Patient now met the criteria for severe malnutrition in the context of acute illness or injury. Patient is amendable to Ensure Plus High Protein supplement, and Magic Cup for nutrient support. As per RN flow sheet, no edema, no pressure injury noted at this time. Small frequent meals, nutrient dense foods encouraged during visit.     __________________ Pertinent Medications__________________   MEDICATIONS  (STANDING):  lactated ringers. 500 milliLiter(s) (50 mL/Hr) IV Continuous <Continuous>  levothyroxine 100 MICROGram(s) Oral daily  melatonin 6 milliGRAM(s) Oral at bedtime  metoprolol tartrate 12.5 milliGRAM(s) Oral every 12 hours  midodrine 10 milliGRAM(s) Oral every 8 hours  pantoprazole    Tablet 40 milliGRAM(s) Oral before breakfast  polyethylene glycol 3350 17 Gram(s) Oral daily  rivaroxaban 20 milliGRAM(s) Oral with dinner  senna 2 Tablet(s) Oral at bedtime    MEDICATIONS  (PRN):  acetaminophen     Tablet .. 650 milliGRAM(s) Oral every 6 hours PRN Mild Pain (1 - 3)  guaiFENesin Oral Liquid (Sugar-Free) 200 milliGRAM(s) Oral every 6 hours PRN Cough  lidocaine   4% Patch 1 Patch Transdermal every 24 hours PRN back pain  ondansetron Injectable 4 milliGRAM(s) IV Push every 6 hours PRN Nausea and/or Vomiting    __________________ Pertinent Labs__________________   03-23 Na140 mmol/L Glu 89 mg/dL K+ 3.5 mmol/L Cr  0.61 mg/dL BUN 11 mg/dL 03-23 Phos 3.5 mg/dL 03-17 Alb 3.0 g/dL<L>    Estimated Needs:   [x ] recalculated:   Based on current weight: 61.6kg (3/22/2023, per RN flow sheet)  Energy: 30-35kcal/kg/day= 1848-2156kcal/day  Protein: 1.2-1.5gm/kg/day= 73.9-92.4gm/day     Previous Nutrition Diagnosis:   [x ] Altered nutrition lab value   Nutrition Diagnosis is [x ] ongoing    New Nutrition Diagnosis:   [x ] Severe Malnutrition in the context of acute illness or injury  Related to: physiological causes  As evidenced by: physical signs of severe-moderate muscle wasting, severe fat loss.     Interventions:   Recommend  [x] Continue to provide DASH/TLC, pureed, moderately thick liquid diet.   [x] Add Ensure Plus High Protein 8oz 2x/day (700kcal, 40gm protein) for nutrient support.   [x] Nutrition Department to provide Magic Cup 4oz 1x/day (290kcal, 9gm protein) for trial.  [x] Encourage PO intake and honor food preferences as able.       Monitoring and Evaluation:   [x ] PO intake [x ] Tolerance to diet prescription [ x] weights [ x] follow up per protocol  [x ] other: bowel movement, skin integrity, labs.

## 2023-03-23 NOTE — PROGRESS NOTE ADULT - PROBLEM SELECTOR PLAN 5
- c/w home med synthroid 100mcg daily  - TSH 9.31 this admission; free T4 WNL
Diet: NPO except meds   DVT prophylaxis: xarelto   Dispo: PT consult pending
- c/w home med synthroid 100mcg daily  - TSH 9.31 this admission; free T4 WNL

## 2023-03-23 NOTE — DISCHARGE NOTE PROVIDER - HOSPITAL COURSE
87 y/o female with PMHx HTN, HLD, a-fib (xarelto), GERD, hypothyroidism, and OA who presented with SOB. Course c/b aspiration event requiring intubation/sedation and MICU admission.  RRT was called for not protecting airway - intubated, later extubated on 3/17. Tracheal sputum cx neg, MRSA neg. Treated with Zosyn and completed course. S/S recommended pureed diet and moderately thick liquids. Patient also treated for UTI, UCx Ecoli - s/p CTX x5 days. Passes TOV. Echo with EF 40% and grossly moderate global LV systolic dysfunction. Was diuresed with Bumex 2mg IVP, repeat echo with normal LV systolic function and no WMA. Continue Midodrine.  87 y/o female with PMHx HTN, HLD, a-fib (xarelto), GERD, hypothyroidism, and OA who presented with SOB. Course c/b aspiration event requiring intubation/sedation and MICU admission.  RRT was called for not protecting airway - intubated, later extubated on 3/17. Tracheal sputum cx neg, MRSA neg. Treated with Zosyn and completed course. S/S recommended pureed diet and moderately thick liquids. Patient also treated for UTI, UCx Ecoli - s/p CTX x5 days. Passes TOV. Echo with EF 40% and grossly moderate global LV systolic dysfunction. Was diuresed with Bumex 2mg IVP, repeat echo with normal LV systolic function and no WMA. Continue Midodrine. Stable for dc to rehab

## 2023-03-23 NOTE — PROGRESS NOTE ADULT - PROVIDER SPECIALTY LIST ADULT
Cardiology
MICU
Cardiology
MICU
MICU
Hospitalist

## 2023-03-23 NOTE — PROGRESS NOTE ADULT - PROBLEM SELECTOR PROBLEM 1
Aspiration pneumonia

## 2023-03-23 NOTE — DISCHARGE NOTE PROVIDER - NSDCCPCAREPLAN_GEN_ALL_CORE_FT
PRINCIPAL DISCHARGE DIAGNOSIS  Diagnosis: Pneumonia, aspiration  Assessment and Plan of Treatment:        PRINCIPAL DISCHARGE DIAGNOSIS  Diagnosis: Pneumonia, aspiration  Assessment and Plan of Treatment: You presented with shortness of breath. You were treated for aspiration pneumonia. You required intubation and later extubated. You received IV antibiotics. You were also treated for UTI.      SECONDARY DISCHARGE DIAGNOSES  Diagnosis: HFrEF (heart failure with reduced ejection fraction)  Assessment and Plan of Treatment: You were treated for heart failure. Low salt diet, fluid restriction to 1500 ml daily, monitor your fluid intake and weight daily, exercise as tolerated 30 minutes daily, and follow up with your physician within 7 days.

## 2023-03-23 NOTE — PROGRESS NOTE ADULT - SUBJECTIVE AND OBJECTIVE BOX
Date of service 3/23/23    chief complaint: SOB    extended hpi: 88-year-old female with a pmh of A-fib on Xarelto, GERD, hypothyroid, hypertension, hyperlipidemia, arthritis who inially presented with complaints of shortness of breath for a few weeks and generalized weakness. She was admitted for Aspiration PNA, had rapid response called after patient was given water to drink as part of dysphagia screen found to be gurgling and then subsequently became unresponsive. After intubation patient hypotensive requiring pressors.    no events, no pain or SOB      Review of Systems:   Constitutional: [ ] fevers, [ ] chills.   Skin: [ ] dry skin. [ ] rashes.  Psychiatric: [ ] depression, [ ] anxiety.   Gastrointestinal: [ ] BRBPR, [ ] melena.   Neurological: [ ] confusion. [ ] seizures. [ ] shuffling gait.   Ears,Nose,Mouth and Throat: [ ] ear pain [ ] sore throat.   Eyes: [ ] diplopia.   Respiratory: [ ] hemoptysis. [ ] shortness of breath  Cardiovascular: See HPI above  Hematologic/Lymphatic: [ ] anemia. [ ] painful nodes. [ ] prolonged bleeding.   Genitourinary: [ ] hematuria. [ ] flank pain.   Endocrine: [ ] significant change in weight. [ ] intolerance to heat and cold.     Review of systems [x ] otherwise negative, [ ] otherwise unable to obtain    FH: no family history of sudden cardiac death in first degree relatives    SH: [ ] tobacco, [ ] alcohol, [ ] drugs    acetaminophen     Tablet .. 650 milliGRAM(s) Oral every 6 hours PRN  guaiFENesin Oral Liquid (Sugar-Free) 200 milliGRAM(s) Oral every 6 hours PRN  lactated ringers. 500 milliLiter(s) IV Continuous <Continuous>  levothyroxine 100 MICROGram(s) Oral daily  lidocaine   4% Patch 1 Patch Transdermal every 24 hours PRN  melatonin 6 milliGRAM(s) Oral at bedtime  metoprolol tartrate 12.5 milliGRAM(s) Oral every 12 hours  midodrine 10 milliGRAM(s) Oral every 8 hours  ondansetron Injectable 4 milliGRAM(s) IV Push every 6 hours PRN  pantoprazole    Tablet 40 milliGRAM(s) Oral before breakfast  polyethylene glycol 3350 17 Gram(s) Oral daily  rivaroxaban 20 milliGRAM(s) Oral with dinner  senna 2 Tablet(s) Oral at bedtime                          8.8    6.58  )-----------( 360      ( 23 Mar 2023 06:20 )             31.0     140  |  102  |  11  ----------------------------<  89  3.5   |  31  |  0.61    Ca    10.0      23 Mar 2023 06:20  Phos  3.5     03-23  Mg     1.90     03-23      CARDIAC MARKERS ( 22 Mar 2023 01:20 )  x     / x     / 33 U/L / x     / x          T(C): 36.6 (03-23-23 @ 07:15), Max: 36.6 (03-23-23 @ 07:15)  HR: 75 (03-23-23 @ 07:15) (75 - 75)  BP: 120/55 (03-23-23 @ 07:15) (120/55 - 120/55)  RR: 18 (03-23-23 @ 07:15) (18 - 18)  SpO2: 97% (03-23-23 @ 07:15) (97% - 97%)  Wt(kg): --    I&O's Summary    22 Mar 2023 07:01  -  23 Mar 2023 07:00  --------------------------------------------------------  IN: 0 mL / OUT: 850 mL / NET: -850 mL    General: awake and alert, cooperative.  speaks only Azerbaijani, daughter translates.  Head: Normocephalic and atraumatic.   Neck: No JVD. No bruits. Supple. Does not appear to be enlarged.   Cardiovascular: + S1,S2 ; RRR Soft systolic murmur at the left lower sternal border. No rubs noted.    Lungs: coarse BS BL  Abdomen: + BS, soft. Non tender. Non distended. No rebound. No guarding.   Extremities: No clubbing/cyanosis/edema.   Neurologic: unable to assess  Skin: Warm and moist. The patient's skin has normal elasticity and good skin turgor.   Psychiatric: unable to assess  Musculoskeletal: unable to assess    DATA    tele- SR ST      < from: Transthoracic Echocardiogram (03.15.23 @ 13:49) >  CONCLUSIONS:  1. Mitral annular calcification, otherwise normal mitral  valve. Mild-moderate mitral regurgitation.  2. Calcified trileaflet aortic valve with normal opening.  Mild aortic regurgitation.  3. Normal left ventricular internal dimensions and wall  thicknesses.  4. Endocardium not well visualized; grossly moderate global  left ventricular systolic dysfunction.  5. Mild diastolic dysfunction (Stage I).  6. The right ventricle is not well visualized; grossly  normal right ventricular systolic function.  ------------------------------------------------------------------------  Confirmed on  3/15/2023 - 15:05:45 by Kaveh Jimenez M.D.,  [facility]  -----------------------    < end of copied text >    TTE 03/21  1. Mitral annular calcification, otherwise normal mitral valve. Moderate mitral regurgitation.  2. Calcified trileaflet aortic valve with normal opening. Mild aortic regurgitation.  3. Moderate concentric left ventricular hypertrophy.  4. Normal left ventricular systolic function. No segmental wall motion abnormalities.      ASSESSMENT/PLAN: Pt is a 88-year-old female with a pmh of A-fib on Xarelto, GERD, hypothyroid, hypertension, hyperlipidemia, arthritis who inially presented with complaints of shortness of breath for a few weeks and generalized weakness.  She was admitted for Aspiration PNA, had rapid response called after patient was given water to drink as part of dysphagia screen found to be gurgling and then subsequently became unresponsive. After intubation patient hypotensive requiring pressor support.    1. Shock  - likely septic due to asp PNA and +UA  - previous normal LVEF on echo, now with LVEF 40%--suspect due to septic shock  - repeat echo with normal LVEF and no segmental wall motion abnormalities    2. PAF  - in sinus   - Xarelto resumed    3. Elevated Trop T  --in the setting of PANCHITO, sepsis, which have now resolved  --not c/w ACS  --repeat Echo with no segmental wall motion abnormalities    DC planning to AFTAB    Thank you for allowing us to participate in the care of our mutual patient.  Please do not hesitate to call with any questions. OP F/u in the office with Dr Forbes after UT, [phone]

## 2023-03-23 NOTE — PROGRESS NOTE ADULT - PROBLEM SELECTOR PLAN 6
- home med: toprol 50mg daily  - on metoprolol 12.5mg bid  - c/w midodrine 10mg TID and wean as able
- home med: toprol 50mg daily  - hold in setting of hypotension  - c/w midodrine 10mg TID and wean as able
- home med: toprol 50mg daily  - on metoprolol 12.5mg bid  - c/w midodrine 10mg TID and wean as able
- home med: toprol 50mg daily  - hold in setting of hypotension  - currently on midodrine 20mg TID -- decrease to 10mg TID and further wean as able

## 2023-03-23 NOTE — DISCHARGE NOTE NURSING/CASE MANAGEMENT/SOCIAL WORK - NSDCCRNAME_GEN_ALL_CORE_FT
1 - [facility] [address], [address], [place]  2 - [facility]

## 2023-03-23 NOTE — DISCHARGE NOTE PROVIDER - NSDCMRMEDTOKEN_GEN_ALL_CORE_FT
acetaminophen 325 mg oral tablet: 2 tab(s) orally every 6 hours, As needed, Mild Pain (1 - 3)  guaiFENesin 100 mg/5 mL oral liquid: 10 milliliter(s) orally every 6 hours, As needed, Cough  levothyroxine 100 mcg (0.1 mg) oral tablet: 1 tab(s) orally once a day  lidocaine 4% topical film: Apply topically to affected area once a day  melatonin 3 mg oral tablet: 2 tab(s) orally once a day (at bedtime)  metoprolol: 12.5 milligram(s) orally 2 times a day  midodrine 10 mg oral tablet: 1 tab(s) orally every 8 hours  pantoprazole 40 mg oral delayed release tablet: 1 tab(s) orally once a day  polyethylene glycol 3350 oral powder for reconstitution: 17 gram(s) orally once a day  senna leaf extract oral tablet: 2 tab(s) orally once a day (at bedtime)  Xarelto 20 mg oral tablet: 1 tab(s) orally once a day (in the evening)

## 2023-03-23 NOTE — GOALS OF CARE CONVERSATION - ADVANCED CARE PLANNING - CONVERSATION DETAILS
I met with pt's son along with pt at bedside, pt is unable to pariticipate in the C discussion due to her mental status, per discussion with son as per pt's prior wishes wants to be resuscitated in the event of cardiopullmonary arrest.   Explained her diagnosis, prognosis and her advanced age and fraility, pt' son understands and per pt's wishes will be full code.

## 2023-03-23 NOTE — PROGRESS NOTE ADULT - PROBLEM SELECTOR PROBLEM 5
Hypothyroidism
Need for prophylactic measure
Hypothyroidism

## 2023-03-23 NOTE — PROGRESS NOTE ADULT - PROBLEM SELECTOR PLAN 3
- TTE with EF 40% and grossly moderate global LV systolic dysfunction  - s/p bumex 2mg IV 3/15-17  - cards following, appreciate recs  - plan for repeat TTE next week  - currently on midodrine -- wean as able
Patient with history of atrial fibrillation, on home metoprolol and xarelto   - patient currently in NSR but will continue with home medications
- TTE with EF 40% and grossly moderate global LV systolic dysfunction  - s/p bumex 2mg IV 3/15-17  - cards following, appreciate recs  - plan for repeat TTE next week  - currently on midodrine -- wean as able
- TTE with EF 40% and grossly moderate global LV systolic dysfunction  - s/p bumex 2mg IV 3/15-17  - cards following, appreciate recs  - plan for repeat TTE wednesday per cards   - currently on midodrine -- wean as able
- TTE with EF 40% and grossly moderate global LV systolic dysfunction  - s/p bumex 2mg IV 3/15-17  - cards following, appreciate recs  - rpt TTE showed Normal left ventricular systolic function. No segmental wall motion abnormalities.  - currently on midodrine -- wean as able
- TTE with EF 40% and grossly moderate global LV systolic dysfunction  - s/p bumex 2mg IV 3/15-17  - cards following, appreciate recs  - rpt TTE showed Normal left ventricular systolic function. No segmental wall motion abnormalities.  - currently on midodrine -- wean as able
- TTE with EF 40% and grossly moderate global LV systolic dysfunction  - s/p bumex 2mg IV 3/15-17  - cards following, appreciate recs  - plan for repeat TTE wednesday per cards   - currently on midodrine -- wean as able

## 2023-07-22 RX ORDER — OMEPRAZOLE 10 MG/1
1 CAPSULE, DELAYED RELEASE ORAL
Qty: 0 | Refills: 0 | DISCHARGE

## 2023-07-22 RX ORDER — EZETIMIBE 10 MG/1
1 TABLET ORAL
Qty: 0 | Refills: 0 | DISCHARGE

## 2023-07-22 RX ORDER — CHOLECALCIFEROL (VITAMIN D3) 125 MCG
0 CAPSULE ORAL
Qty: 0 | Refills: 0 | DISCHARGE

## 2023-07-22 RX ORDER — CHOLECALCIFEROL (VITAMIN D3) 125 MCG
1 CAPSULE ORAL
Qty: 0 | Refills: 0 | DISCHARGE

## 2023-07-22 RX ORDER — LEVOTHYROXINE SODIUM 125 MCG
1 TABLET ORAL
Qty: 0 | Refills: 0 | DISCHARGE

## 2023-07-22 RX ORDER — METOPROLOL TARTRATE 50 MG
1 TABLET ORAL
Qty: 0 | Refills: 0 | DISCHARGE

## 2023-07-22 RX ORDER — SIMVASTATIN 20 MG/1
1 TABLET, FILM COATED ORAL
Qty: 0 | Refills: 0 | DISCHARGE

## 2023-07-22 RX ORDER — RIVAROXABAN 15 MG-20MG
1 KIT ORAL
Qty: 0 | Refills: 0 | DISCHARGE

## 2023-12-01 NOTE — DISCHARGE NOTE NURSING/CASE MANAGEMENT/SOCIAL WORK - NSDCVIVACCINE_GEN_ALL_CORE_FT
LEANN for patient to call clinic for his XR results.  Second attempt today.   
No Vaccines Administered.

## 2024-06-21 NOTE — H&P ADULT - PROBLEM SELECTOR PROBLEM 3
Sharifa Moore comes in today for a hospital follow up.      Ms. Moore comes in today for a hospital follow up.  She was admitted from June 1 through 3 for acute diverticulitis.  She actually felt like this started on the Friday before admission.  She started noticing some right lower quadrant pain while she was seated at the dentist.  By Saturday this was worse and she went to the emergency room.  She is diagnosed with diverticulitis.  She was first treated with IV antibiotics, stabilized, and then discharged on oral antibiotics, a combination of Cipro and metronidazole.  She has finished antibiotic therapy.  Bowel movements have improved, still slightly loose but not watery.  She is advancing her diet slowly.  She has an orthopedic appointment upcoming this week as well for hip pain, and really feels as though she is recovering reasonably well.  She denies any additional concerns of abdominal pain, heartburn symptoms nor nausea/vomiting.        Review of Systems   Constitutional:  Negative for activity change, chills and fever.   Respiratory:  Negative for cough, chest tightness, shortness of breath and wheezing.    Cardiovascular:  Negative for chest pain, palpitations and leg swelling.   Gastrointestinal:  Negative for abdominal distention, abdominal pain, constipation, diarrhea, nausea and vomiting.   Musculoskeletal:  Positive for arthralgias. Negative for myalgias.   Neurological:  Negative for dizziness, light-headedness and headaches.       Objective   Physical Exam  Constitutional:       General: She is not in acute distress.     Appearance: Normal appearance. She is not diaphoretic.   Cardiovascular:      Rate and Rhythm: Normal rate and regular rhythm.      Pulses: Normal pulses.      Heart sounds: Normal heart sounds. No murmur heard.     No gallop.   Pulmonary:      Effort: Pulmonary effort is normal. No respiratory distress.      Breath sounds: Normal breath sounds. No wheezing, rhonchi or rales.    Abdominal:      General: Abdomen is flat. Bowel sounds are normal. There is no distension.      Palpations: Abdomen is soft. There is no mass.      Tenderness: There is no abdominal tenderness. There is no guarding or rebound.   Musculoskeletal:         General: No swelling.      Right lower leg: No edema.      Left lower leg: No edema.   Neurological:      Mental Status: She is alert.         Assessment/Plan    Acute diverticulitis: Has finished course of antibiotics.  Recovering well.  Denies any recurrent or persistent symptoms.  Osteoarthritis: Seeing orthopedics this week for further evaluation.  Hypertension: Reasonably well-managed, remains up-to-date with routine labs and follow-ups.    Happy to see her back as previously scheduled.  She is to contact us with any questions.  Problem List Items Addressed This Visit       Diverticulitis - Primary        Atrial fibrillation

## 2024-09-27 NOTE — ED ADULT NURSE NOTE - NEURO BEHAVIOR
The patient has been examined and the H&P has been reviewed:    I concur with the findings and no changes have occurred since H&P was written.    Surgery risks, benefits and alternative options discussed and understood by patient/family.          There are no hospital problems to display for this patient.     calm

## 2025-04-29 NOTE — PROGRESS NOTE ADULT - SUBJECTIVE AND OBJECTIVE BOX
"Inpatient consult to Neuro TeleStroke  Consult performed by: Kirsten Laughlin MD  Consult ordered by: Rosendo Carrizales DO        Virtual Visit start time: 332pm    History Of Present Illness:  Historian: Patient and ED Provider   Christiano Prieto is a 29 y.o. female presenting with ongoing severe headache for the past two days, associated with transient L sided numbness. Started with R shoulder pain, extended to upper back, followed by numbness in R arm, resolved now. H/O migraines with aura, had numbness on L side with previous episodes. .  Last known well: 1pm  Had stroke symptoms resolved at time of presentation: Yes   Current antiplatelet/anticoagulant use: None    Prior Functional Status (Modified [place] Scale):  0 The patient has no residual symptoms.    Stroke Risk Factors:  none    Last Recorded Vitals:  Blood pressure 130/67, pulse 76, temperature 36.3 °C (97.3 °F), temperature source Temporal, resp. rate 18, height 1.575 m (5' 2\"), weight 87.8 kg (193 lb 9 oz), last menstrual period 2024, SpO2 98%, unknown if currently breastfeeding.    NIHSS   1A. Level of Consciousness: 0  1B. Ask Month and Age: 0  1C. Blink Eyes & Squeeze Hands: 0  2. Best Gaze: 0  3. Visual: 0  4. Facial Palsy: 0  5A. Motor - Left Arm: 0  5B. Motor - Right Arm: 0  6A. Motor - Left Le  6B. Motor - Right Le  7. Limb Ataxia: 0  8. Sensory Loss: 0  9. Best Language: 0  10. Dysarthria: 0  11. Extinction and Inattention: 0  NIH Stroke Scale: 0       Relevant Results:  LABS:  Glucose   Date Value Ref Range Status   2025 99 74 - 99 mg/dL Final     INR   Date Value Ref Range Status   2025 1.1 0.9 - 1.1 Final      Results for orders placed or performed during the hospital encounter of 25 (from the past 24 hours)   CBC and Auto Differential   Result Value Ref Range    WBC 4.7 4.4 - 11.3 x10*3/uL    nRBC 0.0 0.0 - 0.0 /100 WBCs    RBC 4.53 4.00 - 5.20 x10*6/uL    Hemoglobin 12.0 12.0 - 16.0 g/dL    Hematocrit 36.3 " 36.0 - 46.0 %    MCV 80 80 - 100 fL    MCH 26.5 26.0 - 34.0 pg    MCHC 33.1 32.0 - 36.0 g/dL    RDW 13.4 11.5 - 14.5 %    Platelets 295 150 - 450 x10*3/uL    Neutrophils % 58.8 40.0 - 80.0 %    Immature Granulocytes %, Automated 0.2 0.0 - 0.9 %    Lymphocytes % 27.2 13.0 - 44.0 %    Monocytes % 11.3 2.0 - 10.0 %    Eosinophils % 1.9 0.0 - 6.0 %    Basophils % 0.6 0.0 - 2.0 %    Neutrophils Absolute 2.77 1.20 - 7.70 x10*3/uL    Immature Granulocytes Absolute, Automated 0.01 0.00 - 0.70 x10*3/uL    Lymphocytes Absolute 1.28 1.20 - 4.80 x10*3/uL    Monocytes Absolute 0.53 0.10 - 1.00 x10*3/uL    Eosinophils Absolute 0.09 0.00 - 0.70 x10*3/uL    Basophils Absolute 0.03 0.00 - 0.10 x10*3/uL   Comprehensive metabolic panel   Result Value Ref Range    Glucose 99 74 - 99 mg/dL    Sodium 138 136 - 145 mmol/L    Potassium 3.8 3.5 - 5.3 mmol/L    Chloride 108 (H) 98 - 107 mmol/L    Bicarbonate 21 21 - 32 mmol/L    Anion Gap 13 10 - 20 mmol/L    Urea Nitrogen 9 6 - 23 mg/dL    Creatinine 0.63 0.50 - 1.05 mg/dL    eGFR >90 >60 mL/min/1.73m*2    Calcium 9.2 8.6 - 10.3 mg/dL    Albumin 4.5 3.4 - 5.0 g/dL    Alkaline Phosphatase 60 33 - 110 U/L    Total Protein 7.1 6.4 - 8.2 g/dL    AST 17 9 - 39 U/L    Bilirubin, Total 0.3 0.0 - 1.2 mg/dL    ALT 16 7 - 45 U/L   Troponin I, High Sensitivity   Result Value Ref Range    Troponin I, High Sensitivity <3 0 - 13 ng/L   Protime-INR   Result Value Ref Range    Protime 12.0 9.8 - 12.4 seconds    INR 1.1 0.9 - 1.1   APTT   Result Value Ref Range    aPTT 38 (H) 26 - 36 seconds   hCG, quantitative, pregnancy   Result Value Ref Range    HCG, Beta-Quantitative <2 <5 mIU/mL   POCT GLUCOSE   Result Value Ref Range    POCT Glucose 93 74 - 99 mg/dL        CT Head Imaging:  CTH imaging personally reviewed, showed no acute ischemic / hemorrhagic changes     CTA Head and Neck Imaging:  CTA imaging not performed       Assessment:  Stroke not suspected, alternative etiology likely   Chronic migraine with  aura.    Aura symptoms have improved. Advise treating migraine with IV Magnesium, fluids, benadryl, reglan, toradol.     No further neurologic work up necessary      Disposition:  Patient will remain at referring facility for further evaluation and management.    Virtual or Telephone Consent    An interactive audio and video telecommunication system which permits real time communications between the patient (at the originating site) and provider (at the distant site) was utilized to provide this telehealth service.   Verbal consent was requested and obtained from Chirstiano Prieto on this date, 04/29/25 for a telehealth visit.      Telestroke is covered in shift work. If there are further Neurological questions or concerns please contact your regional neurologist on call during daytime hours or contact the transfer center with an ADT20 order.    Kirsten Laughlin MD   Patient is a 87y old  Female who presents with a chief complaint of cardiac workup (20 Aug 2022 09:44)      DATE OF SERVICE: 08-20-22 @ 11:04    SUBJECTIVE / OVERNIGHT EVENTS: overnight events noted  "I feel fine'     ROS:  Resp: No cough no sputum production  CVS: No chest pain no palpitations no orthopnea  GI: no N/V/D  : no dysuria, no hematuria          MEDICATIONS  (STANDING):  chlorhexidine 2% Cloths 1 Application(s) Topical <User Schedule>  levothyroxine 100 MICROGram(s) Oral daily  lidocaine   4% Patch 1 Patch Transdermal daily  metoprolol tartrate 100 milliGRAM(s) Oral two times a day  pantoprazole    Tablet 40 milliGRAM(s) Oral before breakfast  rivaroxaban 20 milliGRAM(s) Oral with dinner  simvastatin 20 milliGRAM(s) Oral at bedtime    MEDICATIONS  (PRN):  acetaminophen     Tablet .. 650 milliGRAM(s) Oral every 6 hours PRN Temp greater or equal to 38C (100.4F), Mild Pain (1 - 3), Moderate Pain (4 - 6)        CAPILLARY BLOOD GLUCOSE        I&O's Summary    19 Aug 2022 07:01  -  20 Aug 2022 07:00  --------------------------------------------------------  IN: 450 mL / OUT: 0 mL / NET: 450 mL    20 Aug 2022 07:01  -  20 Aug 2022 11:04  --------------------------------------------------------  IN: 240 mL / OUT: 0 mL / NET: 240 mL        Vital Signs Last 24 Hrs  T(C): 36.8 (20 Aug 2022 05:03), Max: 37.1 (19 Aug 2022 20:21)  T(F): 98.3 (20 Aug 2022 05:03), Max: 98.7 (19 Aug 2022 20:21)  HR: 78 (20 Aug 2022 05:03) (74 - 80)  BP: 138/76 (20 Aug 2022 05:03) (111/70 - 138/76)  BP(mean): --  RR: 18 (20 Aug 2022 05:03) (18 - 18)  SpO2: 97% (20 Aug 2022 05:03) (93% - 97%)    PHYSICAL EXAM:  GENERAL: NAD,  EYES: EOMI, PERRLA,   NECK: Supple, No JVD  CHEST/LUNG: Clear  HEART: S1S2; systolic murmur +   ABDOMEN: Soft, Nontender  EXTREMITIES:  trace edema  NEUROLOGY: Alert, nonfocal  SKIN: No rashes or lesions    LABS:                        9.9    7.24  )-----------( 458      ( 19 Aug 2022 14:13 )             32.8     08-19    139  |  100  |  10  ----------------------------<  140<H>  3.9   |  30  |  0.60    Ca    10.0      19 Aug 2022 14:13      PT/INR - ( 19 Aug 2022 14:13 )   PT: 18.6 sec;   INR: 1.60 ratio         PTT - ( 19 Aug 2022 14:13 )  PTT:35.0 sec            All consultant(s) notes reviewed and care discussed with other providers        Contact Number, Dr Hoyt [phone]